# Patient Record
Sex: MALE | Race: WHITE | Employment: OTHER | URBAN - METROPOLITAN AREA
[De-identification: names, ages, dates, MRNs, and addresses within clinical notes are randomized per-mention and may not be internally consistent; named-entity substitution may affect disease eponyms.]

---

## 2017-01-17 ENCOUNTER — OFFICE VISIT (OUTPATIENT)
Dept: PULMONOLOGY | Age: 62
End: 2017-01-17

## 2017-01-17 VITALS
WEIGHT: 208 LBS | BODY MASS INDEX: 29.01 KG/M2 | HEART RATE: 62 BPM | SYSTOLIC BLOOD PRESSURE: 132 MMHG | DIASTOLIC BLOOD PRESSURE: 78 MMHG

## 2017-01-17 DIAGNOSIS — R05.3 CHRONIC COUGH: Primary | ICD-10-CM

## 2017-01-17 DIAGNOSIS — J45.30 MILD PERSISTENT ASTHMA WITHOUT COMPLICATION: ICD-10-CM

## 2017-01-17 PROCEDURE — 99214 OFFICE O/P EST MOD 30 MIN: CPT | Performed by: INTERNAL MEDICINE

## 2017-02-06 ENCOUNTER — TELEPHONE (OUTPATIENT)
Dept: FAMILY MEDICINE CLINIC | Age: 62
End: 2017-02-06

## 2017-02-06 DIAGNOSIS — E78.5 HYPERLIPIDEMIA, UNSPECIFIED HYPERLIPIDEMIA TYPE: Primary | ICD-10-CM

## 2017-02-06 DIAGNOSIS — Z11.59 NEED FOR HEPATITIS C SCREENING TEST: ICD-10-CM

## 2017-02-06 DIAGNOSIS — Z11.4 ENCOUNTER FOR SCREENING FOR HIV: ICD-10-CM

## 2017-02-06 DIAGNOSIS — Z12.5 SCREENING FOR MALIGNANT NEOPLASM OF PROSTATE: ICD-10-CM

## 2017-02-06 DIAGNOSIS — Z00.00 WELL ADULT EXAM: ICD-10-CM

## 2017-02-09 LAB
A/G RATIO: 1.8 (ref 1.1–2.2)
ALBUMIN SERPL-MCNC: 4.4 G/DL (ref 3.4–5)
ALP BLD-CCNC: 94 U/L (ref 40–129)
ALT SERPL-CCNC: 28 U/L (ref 10–40)
ANION GAP SERPL CALCULATED.3IONS-SCNC: 16 MMOL/L (ref 3–16)
AST SERPL-CCNC: 28 U/L (ref 15–37)
BASOPHILS ABSOLUTE: 0.1 K/UL (ref 0–0.2)
BASOPHILS RELATIVE PERCENT: 0.9 %
BILIRUB SERPL-MCNC: 0.5 MG/DL (ref 0–1)
BUN BLDV-MCNC: 21 MG/DL (ref 7–20)
CALCIUM SERPL-MCNC: 9.6 MG/DL (ref 8.3–10.6)
CHLORIDE BLD-SCNC: 100 MMOL/L (ref 99–110)
CHOLESTEROL, TOTAL: 181 MG/DL (ref 0–199)
CO2: 24 MMOL/L (ref 21–32)
CREAT SERPL-MCNC: 1.1 MG/DL (ref 0.8–1.3)
EOSINOPHILS ABSOLUTE: 0.1 K/UL (ref 0–0.6)
EOSINOPHILS RELATIVE PERCENT: 1.7 %
GFR AFRICAN AMERICAN: >60
GFR NON-AFRICAN AMERICAN: >60
GLOBULIN: 2.4 G/DL
GLUCOSE BLD-MCNC: 95 MG/DL (ref 70–99)
HCT VFR BLD CALC: 48.9 % (ref 40.5–52.5)
HDLC SERPL-MCNC: 73 MG/DL (ref 40–60)
HEMOGLOBIN: 16 G/DL (ref 13.5–17.5)
HEPATITIS C ANTIBODY INTERPRETATION: NORMAL
LDL CHOLESTEROL CALCULATED: 92 MG/DL
LYMPHOCYTES ABSOLUTE: 1.2 K/UL (ref 1–5.1)
LYMPHOCYTES RELATIVE PERCENT: 17.9 %
MCH RBC QN AUTO: 29.8 PG (ref 26–34)
MCHC RBC AUTO-ENTMCNC: 32.6 G/DL (ref 31–36)
MCV RBC AUTO: 91.3 FL (ref 80–100)
MONOCYTES ABSOLUTE: 0.7 K/UL (ref 0–1.3)
MONOCYTES RELATIVE PERCENT: 10.5 %
NEUTROPHILS ABSOLUTE: 4.7 K/UL (ref 1.7–7.7)
NEUTROPHILS RELATIVE PERCENT: 69 %
PDW BLD-RTO: 13.6 % (ref 12.4–15.4)
PLATELET # BLD: 183 K/UL (ref 135–450)
PMV BLD AUTO: 8.3 FL (ref 5–10.5)
POTASSIUM SERPL-SCNC: 4.7 MMOL/L (ref 3.5–5.1)
PROSTATE SPECIFIC ANTIGEN: 5.55 NG/ML (ref 0–4)
RBC # BLD: 5.36 M/UL (ref 4.2–5.9)
SODIUM BLD-SCNC: 140 MMOL/L (ref 136–145)
TOTAL CK: 140 U/L (ref 39–308)
TOTAL PROTEIN: 6.8 G/DL (ref 6.4–8.2)
TRIGL SERPL-MCNC: 78 MG/DL (ref 0–150)
VLDLC SERPL CALC-MCNC: 16 MG/DL
WBC # BLD: 6.8 K/UL (ref 4–11)

## 2017-02-10 LAB
ESTIMATED AVERAGE GLUCOSE: 108.3 MG/DL
HBA1C MFR BLD: 5.4 %
HIV-1 AND HIV-2 ANTIBODIES: NORMAL

## 2017-02-15 ENCOUNTER — OFFICE VISIT (OUTPATIENT)
Dept: PULMONOLOGY | Age: 62
End: 2017-02-15

## 2017-02-15 VITALS
SYSTOLIC BLOOD PRESSURE: 154 MMHG | BODY MASS INDEX: 29.01 KG/M2 | HEART RATE: 77 BPM | WEIGHT: 208 LBS | DIASTOLIC BLOOD PRESSURE: 84 MMHG

## 2017-02-15 DIAGNOSIS — R05.3 CHRONIC COUGH: Primary | ICD-10-CM

## 2017-02-15 DIAGNOSIS — R49.0 HOARSENESS: ICD-10-CM

## 2017-02-15 DIAGNOSIS — R09.89 CHRONIC THROAT CLEARING: ICD-10-CM

## 2017-02-15 PROCEDURE — 99213 OFFICE O/P EST LOW 20 MIN: CPT | Performed by: INTERNAL MEDICINE

## 2017-02-16 ENCOUNTER — OFFICE VISIT (OUTPATIENT)
Dept: FAMILY MEDICINE CLINIC | Age: 62
End: 2017-02-16

## 2017-02-16 VITALS
BODY MASS INDEX: 29.2 KG/M2 | DIASTOLIC BLOOD PRESSURE: 80 MMHG | HEART RATE: 63 BPM | SYSTOLIC BLOOD PRESSURE: 120 MMHG | HEIGHT: 71 IN | WEIGHT: 208.6 LBS | OXYGEN SATURATION: 95 %

## 2017-02-16 DIAGNOSIS — Z00.00 WELL ADULT EXAM: ICD-10-CM

## 2017-02-16 DIAGNOSIS — R25.2 MUSCLE CRAMPS AT NIGHT: ICD-10-CM

## 2017-02-16 DIAGNOSIS — I73.00 RAYNAUD'S DISEASE WITHOUT GANGRENE: ICD-10-CM

## 2017-02-16 DIAGNOSIS — N20.0 NEPHROLITHIASIS: ICD-10-CM

## 2017-02-16 DIAGNOSIS — R05.3 CHRONIC COUGH: ICD-10-CM

## 2017-02-16 DIAGNOSIS — N26.1 ATROPHY OF RIGHT KIDNEY: ICD-10-CM

## 2017-02-16 DIAGNOSIS — E78.5 HYPERLIPIDEMIA, UNSPECIFIED HYPERLIPIDEMIA TYPE: ICD-10-CM

## 2017-02-16 PROBLEM — J45.30 MILD PERSISTENT ASTHMA WITHOUT COMPLICATION: Status: RESOLVED | Noted: 2017-01-17 | Resolved: 2017-02-16

## 2017-02-16 PROCEDURE — 99396 PREV VISIT EST AGE 40-64: CPT | Performed by: FAMILY MEDICINE

## 2017-03-09 RX ORDER — ATORVASTATIN CALCIUM 10 MG/1
10 TABLET, FILM COATED ORAL DAILY
Qty: 30 TABLET | Refills: 3 | Status: SHIPPED | OUTPATIENT
Start: 2017-03-09 | End: 2017-04-12 | Stop reason: SDUPTHER

## 2017-04-12 RX ORDER — ATORVASTATIN CALCIUM 10 MG/1
10 TABLET, FILM COATED ORAL DAILY
Qty: 90 TABLET | Refills: 3 | Status: SHIPPED | OUTPATIENT
Start: 2017-04-12 | End: 2018-04-18 | Stop reason: SDUPTHER

## 2017-09-28 ENCOUNTER — OFFICE VISIT (OUTPATIENT)
Dept: DERMATOLOGY | Age: 62
End: 2017-09-28

## 2017-09-28 DIAGNOSIS — L57.0 AK (ACTINIC KERATOSIS): ICD-10-CM

## 2017-09-28 DIAGNOSIS — Z85.828 HISTORY OF SKIN CANCER: Primary | ICD-10-CM

## 2017-09-28 DIAGNOSIS — D22.9 MULTIPLE NEVI: ICD-10-CM

## 2017-09-28 DIAGNOSIS — L82.1 SEBORRHEIC KERATOSIS: ICD-10-CM

## 2017-09-28 PROCEDURE — 17000 DESTRUCT PREMALG LESION: CPT | Performed by: DERMATOLOGY

## 2017-09-28 PROCEDURE — 17003 DESTRUCT PREMALG LES 2-14: CPT | Performed by: DERMATOLOGY

## 2017-09-28 PROCEDURE — 99213 OFFICE O/P EST LOW 20 MIN: CPT | Performed by: DERMATOLOGY

## 2017-11-20 ENCOUNTER — TELEPHONE (OUTPATIENT)
Dept: FAMILY MEDICINE CLINIC | Age: 62
End: 2017-11-20

## 2017-11-20 NOTE — TELEPHONE ENCOUNTER
Pt thinks he has bronchitis. Has a gurgling sensation in chest, fever of 100 at one point, cough-dry, headaches, chills and feels like he is burning up, body aches, fatigue. The cough is keeping him awake.  Yesterday was having throbbing pain in left side of chest

## 2017-12-11 ENCOUNTER — TELEPHONE (OUTPATIENT)
Dept: FAMILY MEDICINE CLINIC | Age: 62
End: 2017-12-11

## 2017-12-11 NOTE — TELEPHONE ENCOUNTER
Pt needs a pre-op before next Tuesday 12/19. His surgery is next Tuesday 12/19/17,[facility], Dr. Rita Amor.     Please advise  [facility] [phone]

## 2017-12-12 NOTE — TELEPHONE ENCOUNTER
Pt called said that if he doesn't get a call back by the end of business today he is changing doctors and will come in and set down with Dr. Marleny Burk and tell him that his office staff is horrible and he can never get in to see him.

## 2017-12-14 ENCOUNTER — OFFICE VISIT (OUTPATIENT)
Dept: FAMILY MEDICINE CLINIC | Age: 62
End: 2017-12-14

## 2017-12-14 VITALS
WEIGHT: 207.3 LBS | BODY MASS INDEX: 29.02 KG/M2 | HEART RATE: 81 BPM | HEIGHT: 71 IN | OXYGEN SATURATION: 97 % | DIASTOLIC BLOOD PRESSURE: 80 MMHG | SYSTOLIC BLOOD PRESSURE: 120 MMHG

## 2017-12-14 DIAGNOSIS — Z01.818 PRE-OP EXAM: ICD-10-CM

## 2017-12-14 DIAGNOSIS — N20.0 NEPHROLITHIASIS: ICD-10-CM

## 2017-12-14 PROCEDURE — 3017F COLORECTAL CA SCREEN DOC REV: CPT | Performed by: FAMILY MEDICINE

## 2017-12-14 PROCEDURE — G8427 DOCREV CUR MEDS BY ELIG CLIN: HCPCS | Performed by: FAMILY MEDICINE

## 2017-12-14 PROCEDURE — G8482 FLU IMMUNIZE ORDER/ADMIN: HCPCS | Performed by: FAMILY MEDICINE

## 2017-12-14 PROCEDURE — 99244 OFF/OP CNSLTJ NEW/EST MOD 40: CPT | Performed by: FAMILY MEDICINE

## 2017-12-14 PROCEDURE — G8419 CALC BMI OUT NRM PARAM NOF/U: HCPCS | Performed by: FAMILY MEDICINE

## 2017-12-14 ASSESSMENT — PATIENT HEALTH QUESTIONNAIRE - PHQ9
2. FEELING DOWN, DEPRESSED OR HOPELESS: 0
SUM OF ALL RESPONSES TO PHQ9 QUESTIONS 1 & 2: 0
1. LITTLE INTEREST OR PLEASURE IN DOING THINGS: 0
SUM OF ALL RESPONSES TO PHQ QUESTIONS 1-9: 0

## 2017-12-14 NOTE — PROGRESS NOTES
Subjective:      Dk Alba is a 57 y.o. male who presents to the office today for a preoperative consultation at the request of surgeon Dr Kelsi Padilla who plans on performing lithotripsy(has stone in R kidney) on December 19. This consultation is requested for the specific conditions prompting preoperative evaluation (i.e. because of potential affect on operative risk): none. Planned anesthesia is General.  The patient and family have no known anesthesia issues nor bleeding risks. Past Medical History:   Diagnosis Date    Basal cell carcinoma of right nasal sidewall 6/4/2015    BPH (benign prostatic hyperplasia) 10/18/2010    Hyperlipidemia 10/18/2010    Mild persistent asthma without complication 5/63/4930    Nephrolithiasis     s/p lithotripsy x 4, calcium oxalate.      Raynaud's disease 12/4/2014    Wears glasses      Patient Active Problem List    Diagnosis Date Noted    Nephrolithiasis      Priority: High    Atrophy of right kidney 02/16/2017    Muscle cramps at night 02/16/2017    Hoarseness 02/15/2017    Chronic cough 12/14/2016    Chronic throat clearing 11/22/2016    Pre-op exam 09/10/2015    Laryngopharyngeal reflux (LPR) 09/10/2015    Visit for wound check 06/24/2015    Basal cell carcinoma of right nasal sidewall 06/04/2015    RBBB 12/04/2014    Raynaud's disease 12/04/2014    Rhinitis due food 12/04/2014    Vitamin D deficiency 11/18/2010    Hyperlipidemia 10/18/2010    BPH with urinary obstruction 10/18/2010    Well adult exam 10/18/2010     Past Surgical History:   Procedure Laterality Date    COLONOSCOPY  2007    q 10    CYSTOSCOPY  11/14/13     CYSTOSCOPY TRANSURETHRAL RESECTION PROSTATE    HERNIA REPAIR      KIDNEY STONE SURGERY Left 8/29/13    perc. nephrolithotomy,stint placement    MOHS [facility]GERY  06[facility]    [facility]    OTHER SURGICAL HISTORY      perirectal abscess 1992    PROSTATE SURGERY  4/05    biopsy     Family History   Problem Relation Age of Onset    Hypertension Father     Heart Disease Father      valvular heart disease    Cancer Mother      lymphoma  80     Social History     Social History    Marital status:      Spouse name: N/A    Number of children: 3    Years of education: N/A     Occupational History    [employer]       Social History Main Topics    Smoking status: Never Smoker    Smokeless tobacco: Never Used    Alcohol use No    Drug use: No    Sexual activity: Yes     Partners: Female     Other Topics Concern    None     Social History Narrative    Likes job>50/w( at [employer])    Exercise daily     happily     All kids out of town; scattered-4 grandchildren    + seatbelts    Hobbies-astronomy--?  and Jainism     Current Outpatient Prescriptions   Medication Sig Dispense Refill    atorvastatin (LIPITOR) 10 MG tablet Take 1 tablet by mouth daily 90 tablet 3    hydrocortisone (WESTCORT) 0.2 % cream Apply topically 2 times daily. 30 g 2    OMEPRAZOLE PO Take by mouth      FISH OIL by Does not apply route.  aspirin 81 MG tablet Take 81 mg by mouth daily.  Multiple Vitamins-Minerals (MULTI FOR HIM 50+) TABS Take  by mouth.  Cholecalciferol (VITAMIN D3) 1000 UNIT CAPS Take 2,000 Int'l Units by mouth daily.  Coenzyme Q-10 100 MG CAPS Take 1 capsule by mouth daily.  niacin 500 MG tablet Take 1,000 mg by mouth daily (with breakfast).        Current Facility-Administered Medications   Medication Dose Route Frequency Provider Last Rate Last Dose    triamcinolone acetonide (KENALOG-40) injection 40 mg  40 mg Intra-Lesional Once Franki Seymour MD        triamcinolone acetonide (KENALOG) injection 10 mg  10 mg Intradermal Once Jesus Griffin MD         Current Outpatient Prescriptions on File Prior to Visit   Medication Sig Dispense Refill    atorvastatin (LIPITOR) 10 MG tablet Take 1 tablet by mouth daily 90 tablet 3    hydrocortisone (WESTCORT) adenopathy, thyroid: not enlarged, symmetric, no tenderness/mass/nodules, no carotid bruit or JVD   Back:   Symmetric, no curvature, ROM normal, no CVA tenderness   Lungs:   Clear to auscultation bilaterally, respirations unlabored   Chest Wall:  No tenderness or deformity   Heart:  Regular rate and rhythm, S1, S2 normal, no murmur, rub or gallop   Abdomen:   Soft, non-tender, bowel sounds active all four quadrants,  no masses, no organomegaly   Genitalia:  Normal male   Rectal:  Normal tone, normal prostate, no masses or tenderness;  guaiac negative stool   Extremities: Extremities normal, atraumatic, no cyanosis or edema   Pulses: 2+ and symmetric   Skin: Skin color, texture, turgor normal, no rashes or lesions   Lymph nodes: Cervical, supraclavicular, and axillary nodes normal   Neurologic: Normal           Predictors of intubation difficulty:   Morbid obesity? no   Anatomically abnormal facies? no   Prominent incisors? no   Receding mandible? no   Short, thick neck? no   Neck range of motion: normal   Mallampati score: I (soft palate, uvula, fauces, tonsillar pillars visible)   Thyromental distance: < 6cm   Mouth openincm   Dentition: No chipped, loose, or missing teeth. Lab Review   No visits with results within 2 Month(s) from this visit.    Latest known visit with results is:   Telephone on 2017   Component Date Value    Hemoglobin A1C 02/10/2017 5.4     eAG 02/10/2017 108.3     WBC 2017 6.8     RBC 2017 5.36     Hemoglobin 2017 16.0     Hematocrit 2017 48.9     MCV 2017 91.3     MCH 2017 29.8     MCHC 2017 32.6     RDW 2017 13.6     Platelets  183     MPV 2017 8.3     Neutrophils % 2017 69.0     Lymphocytes % 2017 17.9     Monocytes % 2017 10.5     Eosinophils % 2017 1.7     Basophils % 2017 0.9     Neutrophils # 2017 4.7     Lymphocytes # 2017 1.2     Monocytes # 02/09/2017 0.7     Eosinophils # 02/09/2017 0.1     Basophils # 02/09/2017 0.1     Sodium 02/09/2017 140     Potassium 02/09/2017 4.7     Chloride 02/09/2017 100     CO2 02/09/2017 24     Anion Gap 02/09/2017 16     Glucose 02/09/2017 95     BUN 02/09/2017 21*    CREATININE 02/09/2017 1.1     GFR Non- 02/09/2017 >60     GFR  02/09/2017 >60     Calcium 02/09/2017 9.6     Total Protein 02/09/2017 6.8     Alb 02/09/2017 4.4     Albumin/Globulin Ratio 02/09/2017 1.8     Total Bilirubin 02/09/2017 0.5     Alkaline Phosphatase 02/09/2017 94     ALT 02/09/2017 28     AST 02/09/2017 28     Globulin 02/09/2017 2.4     Total CK 02/09/2017 140     Cholesterol, Total 02/09/2017 181     Triglycerides 02/09/2017 78     HDL 02/09/2017 73*    LDL Calculated 02/09/2017 92     VLDL CHOLESTEROL CALCULA* 02/09/2017 16     Hep C Ab Interp 02/09/2017 Non-reactive     HIV-1/HIV-2 Ab 02/10/2017 Non-reactive     PSA 02/09/2017 5.55*         Assessment:        57 y.o. male with planned surgery as above--OKd for planned procedure  Known risk factors for perioperative complications: None    Difficulty with intubation is not anticipated. Cardiac Risk Estimation: per the Revised Cardiac Risk Index --low risk    Current medications which may produce withdrawal symptoms if withheld perioperatively: none       Plan:      1. Preoperative workup as follows none  2. Change in medication regimen before surgery: none, continue med regimen including morning of surgery, w/sip of water  3. Prophylaxis for cardiac events with perioperative beta-blockers: not indicated  4. Invasive hemodynamic monitoring perioperatively: not indicated  5.  Deep vein thrombosis prophylaxis postoperatively:regimen to be chosen by surgical team

## 2018-04-18 RX ORDER — ATORVASTATIN CALCIUM 10 MG/1
10 TABLET, FILM COATED ORAL DAILY
Qty: 90 TABLET | Refills: 3 | Status: SHIPPED | OUTPATIENT
Start: 2018-04-18 | End: 2018-09-12 | Stop reason: SDUPTHER

## 2018-04-26 ENCOUNTER — TELEPHONE (OUTPATIENT)
Dept: FAMILY MEDICINE CLINIC | Age: 63
End: 2018-04-26

## 2018-04-26 ENCOUNTER — OFFICE VISIT (OUTPATIENT)
Dept: INTERNAL MEDICINE | Age: 63
End: 2018-04-26

## 2018-04-26 VITALS
BODY MASS INDEX: 29.54 KG/M2 | DIASTOLIC BLOOD PRESSURE: 74 MMHG | WEIGHT: 211 LBS | HEART RATE: 68 BPM | SYSTOLIC BLOOD PRESSURE: 132 MMHG | HEIGHT: 71 IN | OXYGEN SATURATION: 97 %

## 2018-04-26 DIAGNOSIS — H66.003 ACUTE SUPPURATIVE OTITIS MEDIA OF BOTH EARS WITHOUT SPONTANEOUS RUPTURE OF TYMPANIC MEMBRANES, RECURRENCE NOT SPECIFIED: Primary | ICD-10-CM

## 2018-04-26 PROCEDURE — 3017F COLORECTAL CA SCREEN DOC REV: CPT | Performed by: NURSE PRACTITIONER

## 2018-04-26 PROCEDURE — G8419 CALC BMI OUT NRM PARAM NOF/U: HCPCS | Performed by: NURSE PRACTITIONER

## 2018-04-26 PROCEDURE — 99213 OFFICE O/P EST LOW 20 MIN: CPT | Performed by: NURSE PRACTITIONER

## 2018-04-26 PROCEDURE — 1036F TOBACCO NON-USER: CPT | Performed by: NURSE PRACTITIONER

## 2018-04-26 PROCEDURE — G8427 DOCREV CUR MEDS BY ELIG CLIN: HCPCS | Performed by: NURSE PRACTITIONER

## 2018-04-26 RX ORDER — AMOXICILLIN 500 MG/1
500 CAPSULE ORAL 3 TIMES DAILY
Qty: 21 CAPSULE | Refills: 0 | Status: SHIPPED | OUTPATIENT
Start: 2018-04-26 | End: 2018-05-03

## 2018-04-26 ASSESSMENT — ENCOUNTER SYMPTOMS
SINUS PRESSURE: 0
SINUS PAIN: 0
SHORTNESS OF BREATH: 0
COUGH: 0
WHEEZING: 0

## 2018-09-12 ENCOUNTER — TELEPHONE (OUTPATIENT)
Dept: FAMILY MEDICINE CLINIC | Age: 63
End: 2018-09-12

## 2018-09-12 RX ORDER — ATORVASTATIN CALCIUM 10 MG/1
10 TABLET, FILM COATED ORAL DAILY
Qty: 90 TABLET | Refills: 3 | Status: SHIPPED | OUTPATIENT
Start: 2018-09-12 | End: 2018-12-13 | Stop reason: SDUPTHER

## 2018-09-21 ENCOUNTER — TELEPHONE (OUTPATIENT)
Dept: FAMILY MEDICINE CLINIC | Age: 63
End: 2018-09-21

## 2018-10-01 ENCOUNTER — OFFICE VISIT (OUTPATIENT)
Dept: DERMATOLOGY | Age: 63
End: 2018-10-01
Payer: COMMERCIAL

## 2018-10-01 DIAGNOSIS — L82.1 SEBORRHEIC KERATOSIS: ICD-10-CM

## 2018-10-01 DIAGNOSIS — D22.9 MULTIPLE NEVI: ICD-10-CM

## 2018-10-01 DIAGNOSIS — M71.30 MYXOID CYST: ICD-10-CM

## 2018-10-01 DIAGNOSIS — Z87.2 HISTORY OF ACTINIC KERATOSES: ICD-10-CM

## 2018-10-01 DIAGNOSIS — D23.9 DERMATOFIBROMA: ICD-10-CM

## 2018-10-01 DIAGNOSIS — Z85.828 HISTORY OF NONMELANOMA SKIN CANCER: Primary | ICD-10-CM

## 2018-10-01 PROCEDURE — 99213 OFFICE O/P EST LOW 20 MIN: CPT | Performed by: DERMATOLOGY

## 2018-10-04 ENCOUNTER — OFFICE VISIT (OUTPATIENT)
Dept: ORTHOPEDIC SURGERY | Age: 63
End: 2018-10-04
Payer: COMMERCIAL

## 2018-10-04 VITALS
RESPIRATION RATE: 16 BRPM | SYSTOLIC BLOOD PRESSURE: 136 MMHG | BODY MASS INDEX: 29.54 KG/M2 | HEIGHT: 71 IN | DIASTOLIC BLOOD PRESSURE: 81 MMHG | HEART RATE: 55 BPM | WEIGHT: 211 LBS

## 2018-10-04 DIAGNOSIS — M25.562 LEFT KNEE PAIN, UNSPECIFIED CHRONICITY: Primary | ICD-10-CM

## 2018-10-04 PROCEDURE — 99203 OFFICE O/P NEW LOW 30 MIN: CPT | Performed by: ORTHOPAEDIC SURGERY

## 2018-11-06 ENCOUNTER — TELEPHONE (OUTPATIENT)
Dept: FAMILY MEDICINE CLINIC | Age: 63
End: 2018-11-06

## 2018-11-06 DIAGNOSIS — Z00.00 WELL ADULT EXAM: Primary | ICD-10-CM

## 2018-11-06 DIAGNOSIS — N13.8 BPH WITH URINARY OBSTRUCTION: ICD-10-CM

## 2018-11-06 DIAGNOSIS — E78.5 HYPERLIPIDEMIA, UNSPECIFIED HYPERLIPIDEMIA TYPE: ICD-10-CM

## 2018-11-06 DIAGNOSIS — Z12.5 PROSTATE CANCER SCREENING: ICD-10-CM

## 2018-11-06 DIAGNOSIS — E55.9 VITAMIN D DEFICIENCY: ICD-10-CM

## 2018-11-06 DIAGNOSIS — N40.1 BPH WITH URINARY OBSTRUCTION: ICD-10-CM

## 2018-11-07 LAB
A/G RATIO: 2 (ref 1.1–2.2)
ALBUMIN SERPL-MCNC: 4.3 G/DL (ref 3.4–5)
ALP BLD-CCNC: 105 U/L (ref 40–129)
ALT SERPL-CCNC: 44 U/L (ref 10–40)
ANION GAP SERPL CALCULATED.3IONS-SCNC: 12 MMOL/L (ref 3–16)
AST SERPL-CCNC: 32 U/L (ref 15–37)
BASOPHILS ABSOLUTE: 0 K/UL (ref 0–0.2)
BASOPHILS RELATIVE PERCENT: 0.7 %
BILIRUB SERPL-MCNC: 0.6 MG/DL (ref 0–1)
BUN BLDV-MCNC: 23 MG/DL (ref 7–20)
CALCIUM SERPL-MCNC: 9.6 MG/DL (ref 8.3–10.6)
CHLORIDE BLD-SCNC: 101 MMOL/L (ref 99–110)
CHOLESTEROL, TOTAL: 144 MG/DL (ref 0–199)
CO2: 25 MMOL/L (ref 21–32)
CREAT SERPL-MCNC: 1.1 MG/DL (ref 0.8–1.3)
EOSINOPHILS ABSOLUTE: 0.1 K/UL (ref 0–0.6)
EOSINOPHILS RELATIVE PERCENT: 1.9 %
GFR AFRICAN AMERICAN: >60
GFR NON-AFRICAN AMERICAN: >60
GLOBULIN: 2.1 G/DL
GLUCOSE BLD-MCNC: 90 MG/DL (ref 70–99)
HCT VFR BLD CALC: 46.6 % (ref 40.5–52.5)
HDLC SERPL-MCNC: 50 MG/DL (ref 40–60)
HEMOGLOBIN: 15.7 G/DL (ref 13.5–17.5)
LDL CHOLESTEROL CALCULATED: 82 MG/DL
LYMPHOCYTES ABSOLUTE: 1.5 K/UL (ref 1–5.1)
LYMPHOCYTES RELATIVE PERCENT: 21.1 %
MCH RBC QN AUTO: 30.3 PG (ref 26–34)
MCHC RBC AUTO-ENTMCNC: 33.8 G/DL (ref 31–36)
MCV RBC AUTO: 89.8 FL (ref 80–100)
MONOCYTES ABSOLUTE: 0.7 K/UL (ref 0–1.3)
MONOCYTES RELATIVE PERCENT: 10.1 %
NEUTROPHILS ABSOLUTE: 4.6 K/UL (ref 1.7–7.7)
NEUTROPHILS RELATIVE PERCENT: 66.2 %
PDW BLD-RTO: 13.5 % (ref 12.4–15.4)
PLATELET # BLD: 222 K/UL (ref 135–450)
PMV BLD AUTO: 8.3 FL (ref 5–10.5)
POTASSIUM SERPL-SCNC: 4.6 MMOL/L (ref 3.5–5.1)
PROSTATE SPECIFIC ANTIGEN: 5.11 NG/ML (ref 0–4)
RBC # BLD: 5.19 M/UL (ref 4.2–5.9)
SODIUM BLD-SCNC: 138 MMOL/L (ref 136–145)
TOTAL CK: 109 U/L (ref 39–308)
TOTAL PROTEIN: 6.4 G/DL (ref 6.4–8.2)
TRIGL SERPL-MCNC: 62 MG/DL (ref 0–150)
VITAMIN D 25-HYDROXY: 40 NG/ML
VLDLC SERPL CALC-MCNC: 12 MG/DL
WBC # BLD: 7 K/UL (ref 4–11)

## 2018-12-13 RX ORDER — ATORVASTATIN CALCIUM 10 MG/1
10 TABLET, FILM COATED ORAL DAILY
Qty: 90 TABLET | Refills: 3 | Status: SHIPPED | OUTPATIENT
Start: 2018-12-13 | End: 2019-12-04 | Stop reason: ALTCHOICE

## 2018-12-24 ENCOUNTER — OFFICE VISIT (OUTPATIENT)
Dept: FAMILY MEDICINE CLINIC | Age: 63
End: 2018-12-24
Payer: COMMERCIAL

## 2018-12-24 VITALS
DIASTOLIC BLOOD PRESSURE: 84 MMHG | HEART RATE: 64 BPM | WEIGHT: 210 LBS | BODY MASS INDEX: 29.29 KG/M2 | OXYGEN SATURATION: 95 % | SYSTOLIC BLOOD PRESSURE: 136 MMHG

## 2018-12-24 DIAGNOSIS — E78.5 HYPERLIPIDEMIA, UNSPECIFIED HYPERLIPIDEMIA TYPE: ICD-10-CM

## 2018-12-24 DIAGNOSIS — E55.9 VITAMIN D DEFICIENCY: ICD-10-CM

## 2018-12-24 DIAGNOSIS — K57.30 COLON, DIVERTICULOSIS: ICD-10-CM

## 2018-12-24 DIAGNOSIS — Z23 NEED FOR SHINGLES VACCINE: ICD-10-CM

## 2018-12-24 DIAGNOSIS — Z23 NEED FOR PNEUMOCOCCAL VACCINE: ICD-10-CM

## 2018-12-24 DIAGNOSIS — R09.89 CHRONIC THROAT CLEARING: ICD-10-CM

## 2018-12-24 DIAGNOSIS — Z00.00 WELL ADULT EXAM: Primary | ICD-10-CM

## 2018-12-24 DIAGNOSIS — R25.2 MUSCLE CRAMPS AT NIGHT: ICD-10-CM

## 2018-12-24 PROBLEM — R49.0 HOARSENESS: Status: RESOLVED | Noted: 2017-02-15 | Resolved: 2018-12-24

## 2018-12-24 PROCEDURE — 90471 IMMUNIZATION ADMIN: CPT | Performed by: FAMILY MEDICINE

## 2018-12-24 PROCEDURE — 99397 PER PM REEVAL EST PAT 65+ YR: CPT | Performed by: FAMILY MEDICINE

## 2018-12-24 PROCEDURE — 90670 PCV13 VACCINE IM: CPT | Performed by: FAMILY MEDICINE

## 2018-12-24 ASSESSMENT — PATIENT HEALTH QUESTIONNAIRE - PHQ9
2. FEELING DOWN, DEPRESSED OR HOPELESS: 0
SUM OF ALL RESPONSES TO PHQ9 QUESTIONS 1 & 2: 0
1. LITTLE INTEREST OR PLEASURE IN DOING THINGS: 0
SUM OF ALL RESPONSES TO PHQ QUESTIONS 1-9: 0
SUM OF ALL RESPONSES TO PHQ QUESTIONS 1-9: 0

## 2018-12-24 NOTE — PROGRESS NOTES
without hernia or inguinal adenopathy. Musculoskeletal: Normal range of motion, no synovitis. He exhibits no edema. Neurological: He is alert and oriented to person, place, and time. He has normal reflexes. No cranial nerve deficit. Coordination normal.   Skin: Skin is warm, dry and intact. No suspicious lesions are noted. Psychiatric: He has a normal mood and affect. His speech is normal and behavior is normal. Judgment, cognition and memory are normal.   No visits with results within 1 Month(s) from this visit.    Latest known visit with results is:   Telephone on 11/06/2018   Component Date Value Ref Range Status    PSA 11/07/2018 5.11* 0.00 - 4.00 ng/mL Final    Sodium 11/07/2018 138  136 - 145 mmol/L Final    Potassium 11/07/2018 4.6  3.5 - 5.1 mmol/L Final    Chloride 11/07/2018 101  99 - 110 mmol/L Final    CO2 11/07/2018 25  21 - 32 mmol/L Final    Anion Gap 11/07/2018 12  3 - 16 Final    Glucose 11/07/2018 90  70 - 99 mg/dL Final    BUN 11/07/2018 23* 7 - 20 mg/dL Final    CREATININE 11/07/2018 1.1  0.8 - 1.3 mg/dL Final    GFR Non- 11/07/2018 >60  >60 Final    GFR  11/07/2018 >60  >60 Final    Calcium 11/07/2018 9.6  8.3 - 10.6 mg/dL Final    Total Protein 11/07/2018 6.4  6.4 - 8.2 g/dL Final    Alb 11/07/2018 4.3  3.4 - 5.0 g/dL Final    Albumin/Globulin Ratio 11/07/2018 2.0  1.1 - 2.2 Final    Total Bilirubin 11/07/2018 0.6  0.0 - 1.0 mg/dL Final    Alkaline Phosphatase 11/07/2018 105  40 - 129 U/L Final    ALT 11/07/2018 44* 10 - 40 U/L Final    AST 11/07/2018 32  15 - 37 U/L Final    Globulin 11/07/2018 2.1  g/dL Final    WBC 11/07/2018 7.0  4.0 - 11.0 K/uL Final    RBC 11/07/2018 5.19  4.20 - 5.90 M/uL Final    Hemoglobin 11/07/2018 15.7  13.5 - 17.5 g/dL Final    Hematocrit 11/07/2018 46.6  40.5 - 52.5 % Final    MCV 11/07/2018 89.8  80.0 - 100.0 fL Final    MCH 11/07/2018 30.3  26.0 - 34.0 pg Final    MCHC 11/07/2018 33.8  31.0 - 36.0

## 2019-02-27 ENCOUNTER — TELEPHONE (OUTPATIENT)
Dept: SURGERY | Age: 64
End: 2019-02-27

## 2019-03-26 ENCOUNTER — TELEPHONE (OUTPATIENT)
Dept: FAMILY MEDICINE CLINIC | Age: 64
End: 2019-03-26

## 2019-03-27 ENCOUNTER — OFFICE VISIT (OUTPATIENT)
Dept: FAMILY MEDICINE CLINIC | Age: 64
End: 2019-03-27
Payer: COMMERCIAL

## 2019-03-27 VITALS
WEIGHT: 172 LBS | OXYGEN SATURATION: 96 % | HEART RATE: 61 BPM | HEIGHT: 71 IN | DIASTOLIC BLOOD PRESSURE: 82 MMHG | SYSTOLIC BLOOD PRESSURE: 140 MMHG | BODY MASS INDEX: 24.08 KG/M2

## 2019-03-27 DIAGNOSIS — I49.9 IRREGULAR HEART BEAT: Primary | ICD-10-CM

## 2019-03-27 DIAGNOSIS — I45.10 RBBB: ICD-10-CM

## 2019-03-27 DIAGNOSIS — R00.2 PALPITATION: ICD-10-CM

## 2019-03-27 DIAGNOSIS — I49.9 IRREGULAR HEART BEAT: ICD-10-CM

## 2019-03-27 LAB
ANION GAP SERPL CALCULATED.3IONS-SCNC: 8 MMOL/L (ref 3–16)
BASOPHILS ABSOLUTE: 0 K/UL (ref 0–0.2)
BASOPHILS RELATIVE PERCENT: 0.7 %
BUN BLDV-MCNC: 21 MG/DL (ref 7–20)
CALCIUM SERPL-MCNC: 9.7 MG/DL (ref 8.3–10.6)
CHLORIDE BLD-SCNC: 104 MMOL/L (ref 99–110)
CO2: 28 MMOL/L (ref 21–32)
CREAT SERPL-MCNC: 1 MG/DL (ref 0.8–1.3)
EOSINOPHILS ABSOLUTE: 0.1 K/UL (ref 0–0.6)
EOSINOPHILS RELATIVE PERCENT: 1.4 %
GFR AFRICAN AMERICAN: >60
GFR NON-AFRICAN AMERICAN: >60
GLUCOSE BLD-MCNC: 83 MG/DL (ref 70–99)
HCT VFR BLD CALC: 47.2 % (ref 40.5–52.5)
HEMOGLOBIN: 15.5 G/DL (ref 13.5–17.5)
LYMPHOCYTES ABSOLUTE: 1.4 K/UL (ref 1–5.1)
LYMPHOCYTES RELATIVE PERCENT: 21.6 %
MCH RBC QN AUTO: 29.9 PG (ref 26–34)
MCHC RBC AUTO-ENTMCNC: 32.7 G/DL (ref 31–36)
MCV RBC AUTO: 91.4 FL (ref 80–100)
MONOCYTES ABSOLUTE: 1 K/UL (ref 0–1.3)
MONOCYTES RELATIVE PERCENT: 14.7 %
NEUTROPHILS ABSOLUTE: 4 K/UL (ref 1.7–7.7)
NEUTROPHILS RELATIVE PERCENT: 61.6 %
PDW BLD-RTO: 13.2 % (ref 12.4–15.4)
PLATELET # BLD: 223 K/UL (ref 135–450)
PMV BLD AUTO: 8.3 FL (ref 5–10.5)
POTASSIUM SERPL-SCNC: 4.4 MMOL/L (ref 3.5–5.1)
RBC # BLD: 5.17 M/UL (ref 4.2–5.9)
SODIUM BLD-SCNC: 140 MMOL/L (ref 136–145)
TSH SERPL DL<=0.05 MIU/L-ACNC: 2.54 UIU/ML (ref 0.27–4.2)
WBC # BLD: 6.5 K/UL (ref 4–11)

## 2019-03-27 PROCEDURE — 93000 ELECTROCARDIOGRAM COMPLETE: CPT | Performed by: FAMILY MEDICINE

## 2019-03-27 PROCEDURE — 99213 OFFICE O/P EST LOW 20 MIN: CPT | Performed by: FAMILY MEDICINE

## 2019-03-27 ASSESSMENT — ENCOUNTER SYMPTOMS
NAUSEA: 0
VOMITING: 0
COUGH: 0
SHORTNESS OF BREATH: 0

## 2019-03-27 ASSESSMENT — PATIENT HEALTH QUESTIONNAIRE - PHQ9
SUM OF ALL RESPONSES TO PHQ QUESTIONS 1-9: 0
SUM OF ALL RESPONSES TO PHQ9 QUESTIONS 1 & 2: 0
SUM OF ALL RESPONSES TO PHQ QUESTIONS 1-9: 0
2. FEELING DOWN, DEPRESSED OR HOPELESS: 0
1. LITTLE INTEREST OR PLEASURE IN DOING THINGS: 0

## 2019-03-28 ENCOUNTER — TELEPHONE (OUTPATIENT)
Dept: FAMILY MEDICINE CLINIC | Age: 64
End: 2019-03-28

## 2019-04-18 ENCOUNTER — TELEPHONE (OUTPATIENT)
Dept: FAMILY MEDICINE CLINIC | Age: 64
End: 2019-04-18

## 2019-04-30 ENCOUNTER — TELEPHONE (OUTPATIENT)
Dept: DERMATOLOGY | Age: 64
End: 2019-04-30

## 2019-05-14 ENCOUNTER — TELEPHONE (OUTPATIENT)
Dept: FAMILY MEDICINE CLINIC | Age: 64
End: 2019-05-14

## 2019-05-16 ENCOUNTER — TELEPHONE (OUTPATIENT)
Dept: DERMATOLOGY | Age: 64
End: 2019-05-16

## 2019-05-16 NOTE — TELEPHONE ENCOUNTER
Patient had to cancel his 5/16/19 at 4:00 appointment due to traffic.    new growth on back side of left hand - couple weeks    Requesting that Anat Nj contact patient to reschedule.

## 2019-05-20 ENCOUNTER — HOSPITAL ENCOUNTER (OUTPATIENT)
Dept: NON INVASIVE DIAGNOSTICS | Age: 64
Discharge: HOME | End: 2019-05-20
Payer: COMMERCIAL

## 2019-05-20 DIAGNOSIS — I49.9 IRREGULAR HEART BEAT: ICD-10-CM

## 2019-05-23 ENCOUNTER — OFFICE VISIT (OUTPATIENT)
Dept: DERMATOLOGY | Age: 64
End: 2019-05-23
Payer: COMMERCIAL

## 2019-05-23 DIAGNOSIS — D23.9 DERMATOFIBROMA: ICD-10-CM

## 2019-05-23 DIAGNOSIS — L82.0 INFLAMED SEBORRHEIC KERATOSIS: Primary | ICD-10-CM

## 2019-05-23 DIAGNOSIS — L73.9 FOLLICULITIS: ICD-10-CM

## 2019-05-23 DIAGNOSIS — Z85.828 HISTORY OF NONMELANOMA SKIN CANCER: ICD-10-CM

## 2019-05-23 PROCEDURE — 17110 DESTRUCTION B9 LES UP TO 14: CPT | Performed by: DERMATOLOGY

## 2019-05-23 PROCEDURE — 99213 OFFICE O/P EST LOW 20 MIN: CPT | Performed by: DERMATOLOGY

## 2019-05-23 NOTE — PROGRESS NOTES
[facility]  Dontrell Bradley MD  [phone]      Dk Alba  1955    70 y.o. male     Date of Visit: 5/23/2019    Chief Complaint: f/u skin cancer, lesions  Chief Complaint   Patient presents with    Skin Lesion     back of lt hand     Last seen:     History of Present Illness:    1. Here for a lesion on the L hand that appeared several weeks ago and was crusted and itchy. Seems better now - almost clear but not quite. 2. He notes a few erythematous papules on the upper arms recently. asx  Transient. No trx tried. 3. He notes a firm papule on the L knee. Asx.     4. F/u for skin cancer. S/p Mohs 6-2015 for nod BCC on the R nasal sidewall and then scar revision. No probs with this site - scar looks great and no pain, no bleeding, no concerns. Previously discussed flushing with niacin. No personal or family hx of skin cancer. Review of Systems:  Gen: Feels well, good sense of health. Skin: No changing moles or lesions. Past Medical History, Family History, Surgical History, Medications and Allergies reviewed. Past Medical History:   Diagnosis Date    Basal cell carcinoma of right nasal sidewall 6/4/2015    BPH (benign prostatic hyperplasia) 10/18/2010    Colon, diverticulosis 12/24/2018    Hyperlipidemia 10/18/2010    Mild persistent asthma without complication 2/50/7624    Nephrolithiasis     s/p lithotripsy x 4, calcium oxalate.      Raynaud's disease 12/4/2014    Wears glasses        Past Surgical History:   Procedure Laterality Date    COLONOSCOPY  2007    q 10,5/2018    CYSTOSCOPY  11/14/13     CYSTOSCOPY TRANSURETHRAL RESECTION PROSTATE    HERNIA REPAIR      KIDNEY STONE SURGERY Left 8/29/13    perc. nephrolithotomy,stint placement    MOHS SURGERY  06/2015    [facility]-Dr. Chriss Lott rt nasal sidewall    OTHER SURGICAL HISTORY      perirectal abscess 1992    PROSTATE SURGERY  4/05    biopsy       Outpatient Medications Marked as Taking for the 5/23/19 encounter (Office Visit) with Eron Dumont MD   Medication Sig Dispense Refill    atorvastatin (LIPITOR) 10 MG tablet Take 1 tablet by mouth daily 90 tablet 3    aspirin 81 MG tablet Take 81 mg by mouth daily.  Multiple Vitamins-Minerals (MULTI FOR HIM 50+) TABS Take  by mouth.  Coenzyme Q-10 100 MG CAPS Take 1 capsule by mouth daily. Allergies   Allergen Reactions    Percocet [Oxycodone-Acetaminophen] Other (See Comments)     constipation         Physical Examination     Gen, NAD  The following were examined and determined to be normal: Psych/Neuro, Neck, RLE, LLE, Nails/digit, Scalp/hair, Breast/axilla/chest and Back. face    The following were examined and determined to be abnormal: LUE, RUE  Scar clear  L medial knee area with firm dermal pinkish papule   Upper arms with few 2 mm erythematous pustules  Dorsum of the L hand with scaly pinkish-tan thin papule remaining    Assessment and Plan     1. C/w ISK - L hand - nearly clear; focally remaining  - lesion(s)  treated with liquid nitrogen x 2 cycles. Patient educated on risk of blister, hypopigmentation/scar and wound care. 2. Very mild focal folliculitis - Few follicular pustules on the upper arms - reassured but call if worsening or tender  - can try antibacterial soap     3. L medial knee - DF  - reassured regarding benign appearance    4.  Hx of NMSC, no signs recurrence  - educ sun protection   - encouraged skin checks yearly (sooner if indicated), self check    Previously discussed - Small epidermoid cyst - scrotum

## 2019-05-30 ENCOUNTER — TELEPHONE (OUTPATIENT)
Dept: FAMILY MEDICINE CLINIC | Age: 64
End: 2019-05-30

## 2019-05-31 DIAGNOSIS — R00.2 PALPITATION: Primary | ICD-10-CM

## 2019-06-05 ENCOUNTER — TELEPHONE (OUTPATIENT)
Dept: DERMATOLOGY | Age: 64
End: 2019-06-05

## 2019-06-05 NOTE — TELEPHONE ENCOUNTER
Patient called and would like the codes that is associated with his visit on 5/23.     Call back# [phone]

## 2019-06-07 NOTE — TELEPHONE ENCOUNTER
Patient called back today. He was requesting the CPT codes for date of service 5-23-19. I informed him you were out and would return call next week. If you are permitted to do so he would like it left on his cell phone. He can be reached at [phone].   Thanks

## 2019-07-08 ENCOUNTER — TELEPHONE (OUTPATIENT)
Dept: FAMILY MEDICINE CLINIC | Age: 64
End: 2019-07-08

## 2019-07-16 ENCOUNTER — OFFICE VISIT (OUTPATIENT)
Dept: FAMILY MEDICINE CLINIC | Age: 64
End: 2019-07-16
Payer: COMMERCIAL

## 2019-07-16 VITALS
HEIGHT: 71 IN | DIASTOLIC BLOOD PRESSURE: 90 MMHG | BODY MASS INDEX: 28.84 KG/M2 | OXYGEN SATURATION: 98 % | WEIGHT: 206 LBS | SYSTOLIC BLOOD PRESSURE: 130 MMHG | HEART RATE: 69 BPM

## 2019-07-16 DIAGNOSIS — G45.4 TRANSIENT GLOBAL AMNESIA: ICD-10-CM

## 2019-07-16 DIAGNOSIS — Q21.12 PFO (PATENT FORAMEN OVALE): ICD-10-CM

## 2019-07-16 PROCEDURE — 99213 OFFICE O/P EST LOW 20 MIN: CPT | Performed by: FAMILY MEDICINE

## 2019-07-16 RX ORDER — CLOPIDOGREL BISULFATE 75 MG/1
75 TABLET ORAL DAILY
Qty: 30 TABLET | Refills: 3 | Status: SHIPPED | OUTPATIENT
Start: 2019-07-16 | End: 2019-08-15 | Stop reason: SDUPTHER

## 2019-07-16 RX ORDER — CLOPIDOGREL BISULFATE 75 MG/1
75 TABLET ORAL DAILY
COMMUNITY
End: 2019-07-16 | Stop reason: SDUPTHER

## 2019-07-16 ASSESSMENT — ENCOUNTER SYMPTOMS
ABDOMINAL PAIN: 0
NAUSEA: 0
BACK PAIN: 0
VISUAL CHANGE: 0
SHORTNESS OF BREATH: 0
BOWEL INCONTINENCE: 0
VOMITING: 0

## 2019-08-01 ENCOUNTER — TELEPHONE (OUTPATIENT)
Dept: PULMONOLOGY | Age: 64
End: 2019-08-01

## 2019-08-02 ENCOUNTER — OFFICE VISIT (OUTPATIENT)
Dept: CARDIOLOGY CLINIC | Age: 64
End: 2019-08-02
Payer: COMMERCIAL

## 2019-08-02 ENCOUNTER — TELEPHONE (OUTPATIENT)
Dept: CARDIOLOGY CLINIC | Age: 64
End: 2019-08-02

## 2019-08-02 VITALS
SYSTOLIC BLOOD PRESSURE: 132 MMHG | OXYGEN SATURATION: 97 % | DIASTOLIC BLOOD PRESSURE: 78 MMHG | HEART RATE: 63 BPM | BODY MASS INDEX: 28.87 KG/M2 | WEIGHT: 207 LBS

## 2019-08-02 DIAGNOSIS — R00.2 PALPITATIONS: ICD-10-CM

## 2019-08-02 DIAGNOSIS — G45.9 TIA (TRANSIENT ISCHEMIC ATTACK): ICD-10-CM

## 2019-08-02 DIAGNOSIS — I49.1 PAC (PREMATURE ATRIAL CONTRACTION): ICD-10-CM

## 2019-08-02 DIAGNOSIS — Q21.12 PFO (PATENT FORAMEN OVALE): Primary | ICD-10-CM

## 2019-08-02 DIAGNOSIS — I49.3 PVC (PREMATURE VENTRICULAR CONTRACTION): ICD-10-CM

## 2019-08-02 DIAGNOSIS — I47.1 SVT (SUPRAVENTRICULAR TACHYCARDIA) (HCC): ICD-10-CM

## 2019-08-02 PROCEDURE — 93000 ELECTROCARDIOGRAM COMPLETE: CPT | Performed by: INTERNAL MEDICINE

## 2019-08-02 PROCEDURE — 99204 OFFICE O/P NEW MOD 45 MIN: CPT | Performed by: INTERNAL MEDICINE

## 2019-08-02 RX ORDER — LISINOPRIL 5 MG/1
5 TABLET ORAL DAILY
Qty: 90 TABLET | Refills: 3 | Status: SHIPPED | OUTPATIENT
Start: 2019-08-02 | End: 2019-10-24 | Stop reason: SDUPTHER

## 2019-08-06 ENCOUNTER — OFFICE VISIT (OUTPATIENT)
Dept: ORTHOPEDIC SURGERY | Age: 64
End: 2019-08-06
Payer: COMMERCIAL

## 2019-08-06 VITALS
SYSTOLIC BLOOD PRESSURE: 133 MMHG | BODY MASS INDEX: 28.98 KG/M2 | WEIGHT: 207 LBS | DIASTOLIC BLOOD PRESSURE: 74 MMHG | HEIGHT: 71 IN | HEART RATE: 56 BPM

## 2019-08-06 DIAGNOSIS — M25.562 LEFT KNEE PAIN, UNSPECIFIED CHRONICITY: Primary | ICD-10-CM

## 2019-08-06 PROCEDURE — 20610 DRAIN/INJ JOINT/BURSA W/O US: CPT | Performed by: ORTHOPAEDIC SURGERY

## 2019-08-06 RX ORDER — TRIAMCINOLONE ACETONIDE 40 MG/ML
40 INJECTION, SUSPENSION INTRA-ARTICULAR; INTRAMUSCULAR ONCE
Status: COMPLETED | OUTPATIENT
Start: 2019-08-06 | End: 2019-08-06

## 2019-08-06 RX ADMIN — TRIAMCINOLONE ACETONIDE 40 MG: 40 INJECTION, SUSPENSION INTRA-ARTICULAR; INTRAMUSCULAR at 15:43

## 2019-08-06 NOTE — PROGRESS NOTES
0.25% marcaine, and 1cc of Kenalog (40mg/ml). There were no immediate complications following the injection. The patient was advised of the possibility of injection site reaction and instructed to apply ice to the area and take NSAIDs if able. If he has continued pain, will need to consider MRI, as there was slight suggestion of meniscal tear on prior exam.      Ice prn. I did suggest continuing NSAIDs prn as I did at last visit, but it appears he has been started on Plavix since last year for PFO/TIA. Should not be on NSAIDs then. He was advised to check with Dr. Dejon Rick

## 2019-08-15 RX ORDER — CLOPIDOGREL BISULFATE 75 MG/1
75 TABLET ORAL DAILY
Qty: 90 TABLET | Refills: 3 | Status: SHIPPED | OUTPATIENT
Start: 2019-08-15 | End: 2020-09-04

## 2019-09-19 PROCEDURE — 93228 REMOTE 30 DAY ECG REV/REPORT: CPT | Performed by: INTERNAL MEDICINE

## 2019-09-20 ENCOUNTER — TELEPHONE (OUTPATIENT)
Dept: CARDIOLOGY CLINIC | Age: 64
End: 2019-09-20

## 2019-09-20 DIAGNOSIS — I47.1 SVT (SUPRAVENTRICULAR TACHYCARDIA) (HCC): ICD-10-CM

## 2019-09-20 DIAGNOSIS — R00.2 PALPITATIONS: ICD-10-CM

## 2019-09-20 DIAGNOSIS — G45.9 TIA (TRANSIENT ISCHEMIC ATTACK): Primary | ICD-10-CM

## 2019-09-20 DIAGNOSIS — I49.1 PAC (PREMATURE ATRIAL CONTRACTION): ICD-10-CM

## 2019-09-20 DIAGNOSIS — Q21.12 PFO (PATENT FORAMEN OVALE): ICD-10-CM

## 2019-09-20 DIAGNOSIS — I49.3 PVC (PREMATURE VENTRICULAR CONTRACTION): ICD-10-CM

## 2019-09-24 NOTE — PROGRESS NOTES
AST 32 2018    ALT 44 2018       EC19  Marked sinus  Bradycardia , PVC    Echo: 19  Summary   There is evidence of right-to-left intracardiac shunting/patent foramen   ovale by agitated saline bubble contrast study. Normal left ventricular size, wall thickness & systolic function. Normal left ventricular inflow profile. There are no significant segmental wall motion abnormalities. The left ventricular ejection fraction is calculated at 75 %. Signature  Cath:     Medication:  Current Outpatient Medications   Medication Sig Dispense Refill    clopidogrel (PLAVIX) 75 MG tablet Take 1 tablet by mouth daily 90 tablet 3    lisinopril (PRINIVIL;ZESTRIL) 5 MG tablet Take 1 tablet by mouth daily 90 tablet 3    atorvastatin (LIPITOR) 10 MG tablet Take 1 tablet by mouth daily 90 tablet 3    hydrocortisone (WESTCORT) 0.2 % cream Apply topically 2 times daily. 30 g 2    Multiple Vitamins-Minerals (MULTI FOR HIM 50+) TABS Take  by mouth.  Coenzyme Q-10 100 MG CAPS Take 1 capsule by mouth daily. Current Facility-Administered Medications   Medication Dose Route Frequency Provider Last Rate Last Dose    triamcinolone acetonide (KENALOG-40) injection 40 mg  40 mg Intra-Lesional Once Cruz Kelly MD        triamcinolone acetonide (KENALOG) injection 10 mg  10 mg Intradermal Once Octavia Montana MD           Assessment and plan:   SVT/PAC/PVC  Not symptomatic  Seen on a 48hr monitor and event monitor    TIA   Cryptogenic   Describes symptoms suggestive of Atrial fibrillation    Will schedule a ILR for further monitoring of arrhythmia and possible Atrial fibrillation     PFO  Followed by Dr.Rapp ISAACS    On statin      I independently reviewed * holter MCOT   Thank you for allowing me to participate in the care of Dk Alba. Further evaluation will be based upon the patient's clinical course and testing results.      All questions and concerns were addressed to the

## 2019-09-26 ENCOUNTER — OFFICE VISIT (OUTPATIENT)
Dept: CARDIOLOGY CLINIC | Age: 64
End: 2019-09-26
Payer: COMMERCIAL

## 2019-09-26 VITALS
SYSTOLIC BLOOD PRESSURE: 133 MMHG | BODY MASS INDEX: 27.44 KG/M2 | HEART RATE: 51 BPM | WEIGHT: 196 LBS | DIASTOLIC BLOOD PRESSURE: 80 MMHG | HEIGHT: 71 IN

## 2019-09-26 DIAGNOSIS — G45.9 TIA (TRANSIENT ISCHEMIC ATTACK): ICD-10-CM

## 2019-09-26 DIAGNOSIS — E78.5 HYPERLIPIDEMIA, UNSPECIFIED HYPERLIPIDEMIA TYPE: ICD-10-CM

## 2019-09-26 DIAGNOSIS — R00.2 PALPITATIONS: ICD-10-CM

## 2019-09-26 DIAGNOSIS — I47.1 SVT (SUPRAVENTRICULAR TACHYCARDIA) (HCC): Primary | ICD-10-CM

## 2019-09-26 DIAGNOSIS — Q21.12 PFO (PATENT FORAMEN OVALE): ICD-10-CM

## 2019-09-26 DIAGNOSIS — I49.3 PVC (PREMATURE VENTRICULAR CONTRACTION): ICD-10-CM

## 2019-09-26 DIAGNOSIS — I49.1 PAC (PREMATURE ATRIAL CONTRACTION): ICD-10-CM

## 2019-09-26 PROCEDURE — 99204 OFFICE O/P NEW MOD 45 MIN: CPT | Performed by: INTERNAL MEDICINE

## 2019-09-26 PROCEDURE — 93000 ELECTROCARDIOGRAM COMPLETE: CPT | Performed by: INTERNAL MEDICINE

## 2019-10-01 ENCOUNTER — OFFICE VISIT (OUTPATIENT)
Dept: DERMATOLOGY | Age: 64
End: 2019-10-01
Payer: COMMERCIAL

## 2019-10-01 ENCOUNTER — TELEPHONE (OUTPATIENT)
Dept: CARDIOLOGY CLINIC | Age: 64
End: 2019-10-01

## 2019-10-01 DIAGNOSIS — M71.30 MYXOID CYST: ICD-10-CM

## 2019-10-01 DIAGNOSIS — Z85.828 HISTORY OF NONMELANOMA SKIN CANCER: Primary | ICD-10-CM

## 2019-10-01 DIAGNOSIS — D48.5 NEOPLASM OF UNCERTAIN BEHAVIOR OF SKIN: ICD-10-CM

## 2019-10-01 DIAGNOSIS — D23.9 DERMATOFIBROMA: ICD-10-CM

## 2019-10-01 DIAGNOSIS — L57.0 AK (ACTINIC KERATOSIS): ICD-10-CM

## 2019-10-01 DIAGNOSIS — D22.9 MULTIPLE NEVI: ICD-10-CM

## 2019-10-01 DIAGNOSIS — L82.1 SEBORRHEIC KERATOSIS: ICD-10-CM

## 2019-10-01 PROCEDURE — 11102 TANGNTL BX SKIN SINGLE LES: CPT | Performed by: DERMATOLOGY

## 2019-10-01 PROCEDURE — 17000 DESTRUCT PREMALG LESION: CPT | Performed by: DERMATOLOGY

## 2019-10-01 PROCEDURE — 99213 OFFICE O/P EST LOW 20 MIN: CPT | Performed by: DERMATOLOGY

## 2019-10-01 PROCEDURE — 17003 DESTRUCT PREMALG LES 2-14: CPT | Performed by: DERMATOLOGY

## 2019-10-03 LAB — DERMATOLOGY PATHOLOGY REPORT: NORMAL

## 2019-10-18 LAB
ALBUMIN SERPL-MCNC: 3.8 G/DL
ALP BLD-CCNC: 90 U/L
ALT SERPL-CCNC: 23 U/L
ANION GAP SERPL CALCULATED.3IONS-SCNC: 1.5 MMOL/L
AST SERPL-CCNC: 33 U/L
BILIRUB SERPL-MCNC: 1.4 MG/DL (ref 0.1–1.4)
BUN BLDV-MCNC: 14.3 MG/DL
CALCIUM SERPL-MCNC: 9.5 MG/DL
CHLORIDE BLD-SCNC: 103 MMOL/L
CO2: 32 MMOL/L
CREAT SERPL-MCNC: 1 MG/DL
GFR CALCULATED: >60
GLUCOSE BLD-MCNC: 100 MG/DL
INR BLD: 2.53
POTASSIUM SERPL-SCNC: 4.6 MMOL/L
PROTIME: 25.4 SECONDS
SODIUM BLD-SCNC: 141 MMOL/L
TOTAL PROTEIN: 6.3

## 2019-10-22 ENCOUNTER — HOSPITAL ENCOUNTER (OUTPATIENT)
Dept: CARDIAC CATH/INVASIVE PROCEDURES | Age: 64
Discharge: HOME | End: 2019-10-22
Attending: INTERNAL MEDICINE | Admitting: INTERNAL MEDICINE
Payer: COMMERCIAL

## 2019-10-22 VITALS
DIASTOLIC BLOOD PRESSURE: 79 MMHG | BODY MASS INDEX: 27.44 KG/M2 | SYSTOLIC BLOOD PRESSURE: 149 MMHG | WEIGHT: 196 LBS | RESPIRATION RATE: 18 BRPM | HEIGHT: 71 IN | HEART RATE: 64 BPM | TEMPERATURE: 98.5 F

## 2019-10-22 DIAGNOSIS — Z45.09 ENCOUNTER FOR LOOP RECORDER CHECK: Primary | ICD-10-CM

## 2019-10-22 PROCEDURE — 33285 INSJ SUBQ CAR RHYTHM MNTR: CPT

## 2019-10-22 PROCEDURE — C1764 EVENT RECORDER, CARDIAC: HCPCS

## 2019-10-22 PROCEDURE — 33285 INSJ SUBQ CAR RHYTHM MNTR: CPT | Performed by: INTERNAL MEDICINE

## 2019-10-23 ENCOUNTER — NURSE ONLY (OUTPATIENT)
Dept: FAMILY MEDICINE CLINIC | Age: 64
End: 2019-10-23

## 2019-10-23 ENCOUNTER — PROCEDURE VISIT (OUTPATIENT)
Dept: CARDIOLOGY CLINIC | Age: 64
End: 2019-10-23

## 2019-10-23 DIAGNOSIS — Z23 NEED FOR INFLUENZA VACCINATION: Primary | ICD-10-CM

## 2019-10-23 DIAGNOSIS — Z45.09 ENCOUNTER FOR LOOP RECORDER CHECK: ICD-10-CM

## 2019-10-27 ENCOUNTER — PATIENT MESSAGE (OUTPATIENT)
Dept: CARDIOLOGY CLINIC | Age: 64
End: 2019-10-27

## 2019-10-28 ENCOUNTER — NURSE ONLY (OUTPATIENT)
Dept: CARDIOLOGY CLINIC | Age: 64
End: 2019-10-28
Payer: COMMERCIAL

## 2019-10-28 DIAGNOSIS — R00.2 PALPITATION: ICD-10-CM

## 2019-10-28 DIAGNOSIS — Z45.09 ENCOUNTER FOR LOOP RECORDER CHECK: ICD-10-CM

## 2019-10-28 PROCEDURE — 93291 INTERROG DEV EVAL SCRMS IP: CPT | Performed by: INTERNAL MEDICINE

## 2019-11-05 ENCOUNTER — TELEPHONE (OUTPATIENT)
Dept: DERMATOLOGY | Age: 64
End: 2019-11-05

## 2019-11-12 ENCOUNTER — OFFICE VISIT (OUTPATIENT)
Dept: DERMATOLOGY | Age: 64
End: 2019-11-12
Payer: COMMERCIAL

## 2019-11-12 DIAGNOSIS — L98.9 SKIN EROSION: Primary | ICD-10-CM

## 2019-11-12 DIAGNOSIS — Z85.828 HISTORY OF NONMELANOMA SKIN CANCER: ICD-10-CM

## 2019-11-12 PROCEDURE — 99212 OFFICE O/P EST SF 10 MIN: CPT | Performed by: DERMATOLOGY

## 2019-11-21 ENCOUNTER — TELEPHONE (OUTPATIENT)
Dept: CARDIOLOGY CLINIC | Age: 64
End: 2019-11-21

## 2019-11-25 DIAGNOSIS — E78.5 HYPERLIPIDEMIA, UNSPECIFIED HYPERLIPIDEMIA TYPE: Primary | ICD-10-CM

## 2019-11-25 DIAGNOSIS — Z00.00 WELL ADULT EXAM: ICD-10-CM

## 2019-11-25 DIAGNOSIS — Z12.5 PROSTATE CANCER SCREENING: ICD-10-CM

## 2019-11-25 DIAGNOSIS — R73.9 HYPERGLYCEMIA: ICD-10-CM

## 2019-11-26 ENCOUNTER — TELEPHONE (OUTPATIENT)
Dept: CARDIOLOGY CLINIC | Age: 64
End: 2019-11-26

## 2019-11-29 DIAGNOSIS — Z00.00 WELL ADULT EXAM: ICD-10-CM

## 2019-11-29 DIAGNOSIS — Z12.5 PROSTATE CANCER SCREENING: ICD-10-CM

## 2019-11-29 DIAGNOSIS — R73.9 HYPERGLYCEMIA: ICD-10-CM

## 2019-11-29 LAB
A/G RATIO: 2.2 (ref 1.1–2.2)
ALBUMIN SERPL-MCNC: 4.4 G/DL (ref 3.4–5)
ALP BLD-CCNC: 92 U/L (ref 40–129)
ALT SERPL-CCNC: 27 U/L (ref 10–40)
ANION GAP SERPL CALCULATED.3IONS-SCNC: 13 MMOL/L (ref 3–16)
AST SERPL-CCNC: 26 U/L (ref 15–37)
BASOPHILS ABSOLUTE: 0 K/UL (ref 0–0.2)
BASOPHILS RELATIVE PERCENT: 0.7 %
BILIRUB SERPL-MCNC: 0.7 MG/DL (ref 0–1)
BUN BLDV-MCNC: 21 MG/DL (ref 7–20)
CALCIUM SERPL-MCNC: 9.4 MG/DL (ref 8.3–10.6)
CHLORIDE BLD-SCNC: 102 MMOL/L (ref 99–110)
CHOLESTEROL, TOTAL: 154 MG/DL (ref 0–199)
CO2: 25 MMOL/L (ref 21–32)
CREAT SERPL-MCNC: 1.1 MG/DL (ref 0.8–1.3)
EOSINOPHILS ABSOLUTE: 0.1 K/UL (ref 0–0.6)
EOSINOPHILS RELATIVE PERCENT: 2 %
ESTIMATED AVERAGE GLUCOSE: 111.2 MG/DL
GFR AFRICAN AMERICAN: >60
GFR NON-AFRICAN AMERICAN: >60
GLOBULIN: 2 G/DL
GLUCOSE BLD-MCNC: 79 MG/DL (ref 70–99)
HBA1C MFR BLD: 5.5 %
HCT VFR BLD CALC: 48.2 % (ref 40.5–52.5)
HDLC SERPL-MCNC: 63 MG/DL (ref 40–60)
HEMOGLOBIN: 16 G/DL (ref 13.5–17.5)
LDL CHOLESTEROL CALCULATED: 74 MG/DL
LYMPHOCYTES ABSOLUTE: 1.3 K/UL (ref 1–5.1)
LYMPHOCYTES RELATIVE PERCENT: 21.6 %
MCH RBC QN AUTO: 30.1 PG (ref 26–34)
MCHC RBC AUTO-ENTMCNC: 33.1 G/DL (ref 31–36)
MCV RBC AUTO: 91 FL (ref 80–100)
MONOCYTES ABSOLUTE: 0.7 K/UL (ref 0–1.3)
MONOCYTES RELATIVE PERCENT: 11.8 %
NEUTROPHILS ABSOLUTE: 3.8 K/UL (ref 1.7–7.7)
NEUTROPHILS RELATIVE PERCENT: 63.9 %
PDW BLD-RTO: 13.4 % (ref 12.4–15.4)
PLATELET # BLD: 216 K/UL (ref 135–450)
PMV BLD AUTO: 8.2 FL (ref 5–10.5)
POTASSIUM SERPL-SCNC: 4.3 MMOL/L (ref 3.5–5.1)
PROSTATE SPECIFIC ANTIGEN: 5.66 NG/ML (ref 0–4)
RBC # BLD: 5.29 M/UL (ref 4.2–5.9)
SODIUM BLD-SCNC: 140 MMOL/L (ref 136–145)
TOTAL PROTEIN: 6.4 G/DL (ref 6.4–8.2)
TRIGL SERPL-MCNC: 85 MG/DL (ref 0–150)
VLDLC SERPL CALC-MCNC: 17 MG/DL
WBC # BLD: 5.9 K/UL (ref 4–11)

## 2019-12-03 ENCOUNTER — NURSE ONLY (OUTPATIENT)
Dept: CARDIOLOGY CLINIC | Age: 64
End: 2019-12-03
Payer: COMMERCIAL

## 2019-12-03 DIAGNOSIS — R00.2 PALPITATION: ICD-10-CM

## 2019-12-03 DIAGNOSIS — Z45.09 ENCOUNTER FOR LOOP RECORDER CHECK: ICD-10-CM

## 2019-12-03 PROCEDURE — 93299 PR REM INTERROG ICPMS/SCRMS <30 D TECH REVIEW: CPT | Performed by: INTERNAL MEDICINE

## 2019-12-03 PROCEDURE — 93298 REM INTERROG DEV EVAL SCRMS: CPT | Performed by: INTERNAL MEDICINE

## 2019-12-04 ENCOUNTER — OFFICE VISIT (OUTPATIENT)
Dept: FAMILY MEDICINE CLINIC | Age: 64
End: 2019-12-04
Payer: COMMERCIAL

## 2019-12-04 VITALS
BODY MASS INDEX: 27.42 KG/M2 | OXYGEN SATURATION: 98 % | DIASTOLIC BLOOD PRESSURE: 82 MMHG | WEIGHT: 196.6 LBS | HEART RATE: 64 BPM | SYSTOLIC BLOOD PRESSURE: 124 MMHG

## 2019-12-04 DIAGNOSIS — Z01.818 PRE-OP EXAM: Primary | ICD-10-CM

## 2019-12-04 DIAGNOSIS — R97.20 PSA ELEVATION: ICD-10-CM

## 2019-12-04 DIAGNOSIS — M67.479 GANGLION CYST OF FOOT: ICD-10-CM

## 2019-12-04 DIAGNOSIS — Z23 NEED FOR PNEUMOCOCCAL VACCINE: ICD-10-CM

## 2019-12-04 PROCEDURE — 90471 IMMUNIZATION ADMIN: CPT | Performed by: FAMILY MEDICINE

## 2019-12-04 PROCEDURE — 99214 OFFICE O/P EST MOD 30 MIN: CPT | Performed by: FAMILY MEDICINE

## 2019-12-04 PROCEDURE — 90732 PPSV23 VACC 2 YRS+ SUBQ/IM: CPT | Performed by: FAMILY MEDICINE

## 2019-12-04 RX ORDER — ROSUVASTATIN CALCIUM 5 MG/1
5 TABLET, COATED ORAL DAILY
Qty: 90 TABLET | Refills: 3 | Status: SHIPPED | OUTPATIENT
Start: 2019-12-04 | End: 2020-02-24 | Stop reason: SDUPTHER

## 2019-12-04 RX ORDER — CHLORPHENIRAMINE MALEATE 4 MG/1
4 TABLET ORAL EVERY 6 HOURS PRN
COMMUNITY

## 2019-12-10 ENCOUNTER — TELEPHONE (OUTPATIENT)
Dept: CARDIOLOGY CLINIC | Age: 64
End: 2019-12-10

## 2019-12-17 ENCOUNTER — TELEPHONE (OUTPATIENT)
Dept: CARDIOLOGY CLINIC | Age: 64
End: 2019-12-17

## 2019-12-18 RX ORDER — LOSARTAN POTASSIUM 100 MG/1
100 TABLET ORAL DAILY
Qty: 30 TABLET | Refills: 5 | Status: SHIPPED | OUTPATIENT
Start: 2019-12-18 | End: 2020-01-10 | Stop reason: SDUPTHER

## 2020-01-10 ENCOUNTER — TELEPHONE (OUTPATIENT)
Dept: CARDIOLOGY CLINIC | Age: 65
End: 2020-01-10

## 2020-01-14 RX ORDER — LOSARTAN POTASSIUM 100 MG/1
100 TABLET ORAL DAILY
Qty: 90 TABLET | Refills: 3 | OUTPATIENT
Start: 2020-01-14

## 2020-01-22 NOTE — PROGRESS NOTES
[address]   Electrophysiology Follow up   Date: 1/30/2020      Chief Complaint   Patient presents with    3 Month Follow-Up     Post Loop    Tachycardia        HPI: Edda Carbajal is a 75 y.o. male with a PMH of HLD and asthma. He is referred today to discuss an ILR implantation at the request of Dr. Ezequiel Diaz. He was seen at [address] [place] [address] with concerns for a TIA . He was found to have a PFO and has had 2 episodes of TIA in the past.    Also reports 2 episodes of what he describes as irregular heart beat with rate from 120-60 for almost an hour 6 and 12 months ago. event monitor and Holters have shown short atrial runs      Today:  He feels fine   No palpitations or syncope    Past Medical History:   Diagnosis Date    Basal cell carcinoma of right nasal sidewall 6/4/2015    BPH (benign prostatic hyperplasia) 10/18/2010    Colon, diverticulosis 12/24/2018    Hyperlipidemia 10/18/2010    Nephrolithiasis     s/p lithotripsy x 4, calcium oxalate.  Raynaud's disease 12/4/2014    Wears glasses         Past Surgical History:   Procedure Laterality Date    COLONOSCOPY  2007    q 10,5/2018    CYSTOSCOPY  11/14/13     CYSTOSCOPY TRANSURETHRAL RESECTION PROSTATE    HERNIA REPAIR      KIDNEY STONE SURGERY Left 8/29/13    perc. nephrolithotomy,stint placement    MOHS SURGERY  [facility]    [facility] Alon[facility]wall    OTHER SURGICAL HISTORY      perirectal abscess 1992    PROSTATE SURGERY  4/05    biopsy       Allergies: Allergies   Allergen Reactions    Percocet [Oxycodone-Acetaminophen] Other (See Comments)     constipation       Social History:   reports that he has never smoked. He has never used smokeless tobacco. He reports that he does not drink alcohol or use drugs. Family History:  family history includes Cancer in his mother; Heart Disease in his father; Hypertension in his father. Reviewed.  Denies family history of sudden cardiac death, arrhythmia, premature CAD    Review of System:  All other systems reviewed and are negative except for that noted above. Pertinent negatives are:   General: negative for fever, chills   Ophthalmic ROS: negative for - eye pain or loss of vision  ENT ROS: negative for - headaches, sore throat   Respiratory: negative for - cough, sputum  Cardiovascular: Reviewed in HPI  Gastrointestinal: negative for - abdominal pain, diarrhea, N/V  Hematology: negative for - bleeding, blood clots, bruising or jaundice  Genito-Urinary:  negative for - Dysuria or incontinence  Musculoskeletal: negative for - Joint swelling, muscle pain  Neurological: negative for - confusion, dizziness, headaches   Psychiatric: No anxiety, no depression. Dermatological: negative for - rash    Physical Examination:  Vitals:    20 1528   BP: 134/80   Pulse: 62   Resp: 18      Wt Readings from Last 3 Encounters:   20 200 lb (90.7 kg)   19 196 lb 9.6 oz (89.2 kg)   10/22/19 196 lb (88.9 kg)       · Constitutional: Oriented. No distress. · Head: Normocephalic and atraumatic. · Mouth/Throat: Oropharynx is clear and moist.   · Eyes: Conjunctivae normal. EOM are normal.   · Neck: Neck supple. No rigidity. No JVD present. · Cardiovascular: Normal rate, regular rhythm, S1&S2. · Pulmonary/Chest: Bilateral respiratory sounds. No wheezes, No rhonchi. · Abdominal: Soft. Bowel sounds present. No distension, No tenderness. · Musculoskeletal: No tenderness. No edema    · Lymphadenopathy: Has no cervical adenopathy. · Neurological: Alert and oriented. Cranial nerve appears intact, No Gross deficit   · Skin: Skin is warm and dry. No rash noted. · Psychiatric: Has a normal behavior       Labs, diagnostic and imaging results reviewed. Reviewed.    Lab Results   Component Value Date    TSH 2.54 2019    TSH 2.95 2016    CREATININE 1.1 2019    CREATININE 1 10/18/2019    AST 26 2019    ALT 27 2019       EC20  Sinus Wilhelmenia Elders

## 2020-01-30 ENCOUNTER — NURSE ONLY (OUTPATIENT)
Dept: CARDIOLOGY CLINIC | Age: 65
End: 2020-01-30
Payer: COMMERCIAL

## 2020-01-30 ENCOUNTER — OFFICE VISIT (OUTPATIENT)
Dept: CARDIOLOGY CLINIC | Age: 65
End: 2020-01-30
Payer: COMMERCIAL

## 2020-01-30 VITALS
DIASTOLIC BLOOD PRESSURE: 80 MMHG | HEART RATE: 62 BPM | HEIGHT: 71 IN | SYSTOLIC BLOOD PRESSURE: 134 MMHG | RESPIRATION RATE: 18 BRPM | WEIGHT: 200 LBS | BODY MASS INDEX: 28 KG/M2

## 2020-01-30 PROCEDURE — 93000 ELECTROCARDIOGRAM COMPLETE: CPT | Performed by: INTERNAL MEDICINE

## 2020-01-30 PROCEDURE — 93291 INTERROG DEV EVAL SCRMS IP: CPT | Performed by: INTERNAL MEDICINE

## 2020-01-30 PROCEDURE — 99214 OFFICE O/P EST MOD 30 MIN: CPT | Performed by: INTERNAL MEDICINE

## 2020-01-31 NOTE — PROGRESS NOTES
Patient comes in for interrogation of their implanted loop recorder. Interrogation shows [id number] episodes that appear to be short runs of AT. Patient will see Dr. Yi Cochran today.

## 2020-02-05 ENCOUNTER — OFFICE VISIT (OUTPATIENT)
Dept: RHEUMATOLOGY | Age: 65
End: 2020-02-05
Payer: COMMERCIAL

## 2020-02-05 VITALS
SYSTOLIC BLOOD PRESSURE: 124 MMHG | DIASTOLIC BLOOD PRESSURE: 76 MMHG | BODY MASS INDEX: 27.86 KG/M2 | HEIGHT: 71 IN | WEIGHT: 199 LBS

## 2020-02-05 PROCEDURE — 99203 OFFICE O/P NEW LOW 30 MIN: CPT | Performed by: INTERNAL MEDICINE

## 2020-02-05 NOTE — PROGRESS NOTES
CONSULTATION:  Patient is being seen at the request of  Susan Foreman MD for evaluation of any rheumatic diseases. HISTORY OF PRESENT ILLNESS:  75 y.o. male who has been experiencing chronic nasal discharge and the need to clear his throat all the time specially in the morning, gets better as the day progresses, has been experiencing that for at least the past 5 to 10 years. He was evaluated by different specialists including primary care, pulmonologist, allergist, is on ipratropium bromide as well as nasal decongestant that has helped with some of the symptoms. His wife found out that 1 of her colleague had similar symptoms, saw her rheumatologist, and had autoimmune disease and was treated and everything got better, therefore he made this appointment. Other than above, he also reports intercurrent joint pain at the base of the thumb, knees, feet, mild, manageable. He also notices cramping of the legs, managed with some kind of over-the-counter spray. No swollen or inflamed joints. Denies any sicca symptoms. No rashes. Pertinent ROS: Denies weight loss, objective fever, skin rashes or skin thickening, photosensitivity Raynauds, focal alopecia, recurrent ocular congestion, dry eyes or mouth, or mucosal ulcers, tinnitus or recent hearing loss. Denies chest pain, palpitations, cough, pleurisy, dysphagia, or features of inflammatory bowel diseases. No h/o blood clots or bleeding disorders. No renal or genitourinary problems. No focal weakness or persistent paresthesia. All other ROS are negative. Labs-GORAN checked in 2011-. Past Medical History:   Diagnosis Date    Basal cell carcinoma of right nasal sidewall 6/4/2015    BPH (benign prostatic hyperplasia) 10/18/2010    Colon, diverticulosis 12/24/2018    Hyperlipidemia 10/18/2010    Nephrolithiasis     s/p lithotripsy x 4, calcium oxalate.      Raynaud's disease 12/4/2014    Wears glasses      Past Surgical History:   Procedure Laterality reveal no evidence of abnormality. External inspection of the ears and nose within normal limits. Neck: No masses or asymmetry. No thyroid enlargement. Chest: Normal effort, clear to auscultation. DATA:   Lab Results   Component Value Date    WBC 5.9 11/29/2019    HGB 16.0 11/29/2019    HCT 48.2 11/29/2019    MCV 91.0 11/29/2019     11/29/2019         Chemistry        Component Value Date/Time     11/29/2019 1204    K 4.3 11/29/2019 1204     11/29/2019 1204    CO2 25 11/29/2019 1204    BUN 21 (H) 11/29/2019 1204    CREATININE 1.1 11/29/2019 1204        Component Value Date/Time    CALCIUM 9.4 11/29/2019 1204    ALKPHOS 92 11/29/2019 1204    AST 26 11/29/2019 1204    ALT 27 11/29/2019 1204    BILITOT 0.7 11/29/2019 1204          Lab Results   Component Value Date    LABURIC 7.7 (H) 03/09/2016     Lab Results   Component Value Date    SEDRATE 5 11/23/2011     Lab Results   Component Value Date    CRP 0.6 11/20/2014     Lab Results   Component Value Date    GORAN Negative 11/23/2011    SEDRATE 5 11/23/2011     Lab Results   Component Value Date    CKTOTAL 109 11/07/2018     Lab Results   Component Value Date    TSH 2.54 03/27/2019     Lab Results   Component Value Date    VITD25 40.0 11/07/2018         Radiology Review:      A/P- See above.

## 2020-02-11 ENCOUNTER — OFFICE VISIT (OUTPATIENT)
Dept: FAMILY MEDICINE CLINIC | Age: 65
End: 2020-02-11
Payer: COMMERCIAL

## 2020-02-11 VITALS
BODY MASS INDEX: 27.34 KG/M2 | HEART RATE: 65 BPM | DIASTOLIC BLOOD PRESSURE: 80 MMHG | OXYGEN SATURATION: 98 % | WEIGHT: 196 LBS | SYSTOLIC BLOOD PRESSURE: 144 MMHG

## 2020-02-11 PROCEDURE — 99213 OFFICE O/P EST LOW 20 MIN: CPT | Performed by: FAMILY MEDICINE

## 2020-02-11 ASSESSMENT — ENCOUNTER SYMPTOMS
RHINORRHEA: 0
ABDOMINAL PAIN: 0
NAUSEA: 0
CONSTIPATION: 0
VISUAL CHANGE: 0
PHOTOPHOBIA: 0
BACK PAIN: 0
VOMITING: 0
DIARRHEA: 0
TROUBLE SWALLOWING: 0

## 2020-02-11 ASSESSMENT — PATIENT HEALTH QUESTIONNAIRE - PHQ9
1. LITTLE INTEREST OR PLEASURE IN DOING THINGS: 0
2. FEELING DOWN, DEPRESSED OR HOPELESS: 0
SUM OF ALL RESPONSES TO PHQ QUESTIONS 1-9: 0
SUM OF ALL RESPONSES TO PHQ QUESTIONS 1-9: 0
SUM OF ALL RESPONSES TO PHQ9 QUESTIONS 1 & 2: 0

## 2020-02-11 NOTE — PROGRESS NOTES
[address] [facility]  Office Visit      Patient: Evangelina Dee is a 75 y.o. male who presents today with the following Chief Complaint(s):  Chief Complaint   Patient presents with    Mass     Back of head, left side. About 2 weeks. Comes and goes but this is the longes       Neck Pain    This is a new problem. Episode onset: 2 weeks ago. The problem occurs constantly. The problem has been unchanged. The pain is associated with a sleep position. The pain is present in the occipital region. The quality of the pain is described as aching. The pain is mild. The symptoms are aggravated by twisting and position. The pain is same all the time. Stiffness is present in the morning. Associated symptoms include headaches. Pertinent negatives include no chest pain, fever, leg pain, numbness, pain with swallowing, paresis, photophobia, syncope, tingling, trouble swallowing, visual change, weakness or weight loss. He has tried nothing for the symptoms. Improvement on treatment: n/a. No injury or trauma  Has tenderness near occipital prominence of skull      Current Outpatient Medications   Medication Sig Dispense Refill    losartan (COZAAR) 100 MG tablet Take 1 tablet by mouth daily 90 tablet 2    IPRATROPIUM BROMIDE NA by Nasal route      chlorpheniramine (CHLOR-TRIMETON) 4 MG tablet Take 4 mg by mouth every 6 hours as needed for Allergies      rosuvastatin (CRESTOR) 5 MG tablet Take 1 tablet by mouth daily 90 tablet 3    clopidogrel (PLAVIX) 75 MG tablet Take 1 tablet by mouth daily 90 tablet 3    hydrocortisone (WESTCORT) 0.2 % cream Apply topically 2 times daily. 30 g 2    Multiple Vitamins-Minerals (MULTI FOR HIM 50+) TABS Take  by mouth.  Coenzyme Q-10 100 MG CAPS Take 1 capsule by mouth daily.        Current Facility-Administered Medications   Medication Dose Route Frequency Provider Last Rate Last Dose    triamcinolone acetonide (KENALOG-40) injection 40 mg  40 mg Intra-Lesional Once Blayne Christian Rodríguez Gutierrez MD        triamcinolone acetonide (KENALOG) injection 10 mg  10 mg Intradermal Once Mary Bender MD           Past Medical History:   Diagnosis Date    Basal cell carcinoma of right nasal sidewall 2015    BPH (benign prostatic hyperplasia) 10/18/2010    Colon, diverticulosis 2018    Hyperlipidemia 10/18/2010    Nephrolithiasis     s/p lithotripsy x 4, calcium oxalate.  Raynaud's disease 2014    Wears glasses      Past Surgical History:   Procedure Laterality Date    COLONOSCOPY      q 10,2018    CYSTOSCOPY  13     CYSTOSCOPY TRANSURETHRAL RESECTION PROSTATE    HERNIA REPAIR      KIDNEY STONE SURGERY Left 13    perc. nephrolithotomy,stint placement    MOHS SURGERY  2015    [facility]-Dr. Rodríguez Gutierrez rt nasal sidewall    OTHER SURGICAL HISTORY      perirectal abscess     PROSTATE SURGERY      biopsy     Family History   Problem Relation Age of Onset    Hypertension Father     Heart Disease Father         valvular heart disease    Cancer Mother         lymphoma  80     Social History     Tobacco Use    Smoking status: Never Smoker    Smokeless tobacco: Never Used   Substance Use Topics    Alcohol use: No     Social History     Patient does not qualify to have social determinant information on file (likely too young). Social History Narrative    Likes job>50/w( at [employer])    Exercise daily     happily     All kids out of town; scattered-4 grandchildren    + seatbelts    Hobbies-astronomy--?  and Orthodoxy     Social History     Substance and Sexual Activity   Sexual Activity Yes    Partners: Female          Patient's past medical history, surgical history, family history, medications,  andallergies  were all reviewed and updated as appropriate today. Review of Systems   Constitutional: Negative for activity change, appetite change, chills, fatigue, fever and weight loss.    HENT: Negative for congestion, postnasal Gait: Gait normal.      Deep Tendon Reflexes: Reflexes normal.             Assessment:  Encounter Diagnosis   Name Primary?  Acute cervical myofascial strain, initial encounter Yes       Plan:  1. Acute cervical myofascial strain, initial encounter  - start nsaids prn along with ice / heat  - given home exercises / stretches to perform  - follow-up as needed -- discussed reasons to call / return      Return if symptoms worsen or fail to improve. Basilio Rinne  [facility], [address] [facility]  2/11/2020

## 2020-02-12 ENCOUNTER — OFFICE VISIT (OUTPATIENT)
Dept: CARDIOLOGY CLINIC | Age: 65
End: 2020-02-12
Payer: COMMERCIAL

## 2020-02-12 VITALS
SYSTOLIC BLOOD PRESSURE: 128 MMHG | DIASTOLIC BLOOD PRESSURE: 62 MMHG | HEART RATE: 84 BPM | BODY MASS INDEX: 27.61 KG/M2 | HEIGHT: 71 IN | WEIGHT: 197.2 LBS

## 2020-02-12 PROCEDURE — 99213 OFFICE O/P EST LOW 20 MIN: CPT | Performed by: INTERNAL MEDICINE

## 2020-02-12 NOTE — PROGRESS NOTES
sweats. No abnormal changes in weight. HEENT: No blurry or decreased vision. No changes in hearing, nasal discharge or sore throat. Cardiovascular: See HPI. No cramping in legs or buttocks when walking. Respiratory: No cough, hemoptysis, or wheezing. No history of asthma. Gastrointestinal: No abdominal pain, hematochezia, melana, or history of GI ulcers. Genito-Urinary: No dysuria or hematuria. No urgency or polyuria. Musculoskeletal: No complaints of joint pain, joint swelling or muscular weakness/soreness. Neurological: No dizziness or headaches. No numbness/tingling, speech problems or weakness. No history of a stroke or TIA. Psychological: No anxiety or depression  Hematological and Lymphatic: No abnormal bleeding or bruising, blood clots, jaundice. Endocrine: No malaise/lethargy, palpitations, polydipsia/polyuria, temperature intolerance or unexpected weight changes. Skin: No rashes or non-healing ulcers. PE:  Blood pressure 128/62, pulse 84, height 5' 11\" (1.803 m), weight 197 lb 3.2 oz (89.4 kg). General (appearance): Well devel. No distress. Eyes: Anicteric. EOMI  Neck: supple. No jvd  Ears/Nose/Mouth/Thorat: No cyanosis  CV: RRR. No m/r/g   Respiratory:  Clear b, normal respiratory effort  GI: abd s/nt/nd. No peritoneal signs  Skin: Warm, dry. No rashes  Neuro/Psych: Alert and oriented x 3. Appropriate behavior  Ext:  No c/c. No pitting edema  Pulses:  2+ carotid.  No bruits    Lab Results   Component Value Date    WBC 5.9 11/29/2019    HGB 16.0 11/29/2019    HCT 48.2 11/29/2019    MCV 91.0 11/29/2019     11/29/2019     Lab Results   Component Value Date     11/29/2019    K 4.3 11/29/2019     11/29/2019    CO2 25 11/29/2019    BUN 21 (H) 11/29/2019    CREATININE 1.1 11/29/2019    GLUCOSE 79 11/29/2019    CALCIUM 9.4 11/29/2019    PROT 6.4 11/29/2019    LABALBU 4.4 11/29/2019    BILITOT 0.7 11/29/2019    ALKPHOS 92 11/29/2019    AST 26 11/29/2019    ALT 27 Rfl:     hydrocortisone (WESTCORT) 0.2 % cream, Apply topically 2 times daily. (Patient not taking: Reported on 2/12/2020), Disp: 30 g, Rfl: 2    A/P:  75 y.o. here for TIA. Has PFO as well. 1. PFO (patent foramen ovale)    2. TIA (transient ischemic attack)    3. Hyperlipidemia, unspecified hyperlipidemia type    4. PAC (premature atrial contraction)    5. PVC (premature ventricular contraction)      Recs:  -Will leave bp meds alone now, but if he is consistently over 130, would start chlorthalidone  -Cont statin, losartan, and plavix  -No stroke on BIBI; no plans for pfo closure. -F/U as needed or in 1 year, sooner if an issue. Vikki Fung MD, [facility]

## 2020-02-24 RX ORDER — ROSUVASTATIN CALCIUM 5 MG/1
5 TABLET, COATED ORAL DAILY
Qty: 90 TABLET | Refills: 3 | Status: SHIPPED | OUTPATIENT
Start: 2020-02-24 | End: 2021-03-11 | Stop reason: SDUPTHER

## 2020-02-24 NOTE — TELEPHONE ENCOUNTER
Last appointment: 2/11/2020  Next appointment:   Future Appointments   Date Time Provider Leila Barbozai   3/3/2020 11:15 AM SCHEDULE, LILLIE REMOTE TRANSMISSION FF [facility]   9/30/2020  3:30 PM SCHEDULE, LILLIE DEVICE CHECK FF [facility]   9/30/2020  3:30 PM Steven Olsen MD FF [facility]   [facility]6/2020  9[facility]30 [facility]   12/7/2020  1:00 PM Arline Newman MD [facility]   2/24/2021  1:15 PM Ivory Chang MD [facility]

## 2020-03-01 PROCEDURE — 93298 REM INTERROG DEV EVAL SCRMS: CPT | Performed by: INTERNAL MEDICINE

## 2020-03-01 PROCEDURE — G2066 INTER DEVC REMOTE 30D: HCPCS | Performed by: INTERNAL MEDICINE

## 2020-03-03 ENCOUNTER — NURSE ONLY (OUTPATIENT)
Dept: CARDIOLOGY CLINIC | Age: 65
End: 2020-03-03
Payer: COMMERCIAL

## 2020-03-03 NOTE — LETTER
[address] [place] [phone]  [address] [address] [address] [facility] [phone]    Pacemaker/Defibrillator Clinic          03/02/20        Dk Alba  [address] [facility] [address]        Dear Feliz Alba    This letter is to inform you that we received the transmission from your monitor at home that checks your implanted heart device. The next date your monitor will automatically transmit will be 4-6-20. If your report needs attention we will notify you. Your device and monitor are wireless and most transmit cellularly, but please periodically check your monitor is still plugged in to the electrical outlet. If you still use the telephone land line to send please ensure the connection to the phone kevin is secure. This will help to ensure successful automatic transmissions in the future. Also, the monitor needs to be close to you while sleeping at night. Please be aware that the remote device transmission sites are periodically monitored only during regular business hours during which simultaneous in-office device clinics are being run. If your transmission requires attention, we will contact you as soon as possible. Thank you.             [facility]

## 2020-04-06 ENCOUNTER — NURSE ONLY (OUTPATIENT)
Dept: CARDIOLOGY CLINIC | Age: 65
End: 2020-04-06
Payer: COMMERCIAL

## 2020-04-06 PROCEDURE — G2066 INTER DEVC REMOTE 30D: HCPCS | Performed by: INTERNAL MEDICINE

## 2020-04-06 PROCEDURE — 93298 REM INTERROG DEV EVAL SCRMS: CPT | Performed by: INTERNAL MEDICINE

## 2020-04-06 NOTE — LETTER
[address] [facility] [phone]  [address] [address] [address] [facility] [place][note 2] [phone]    Pacemaker/Defibrillator Clinic          04/06/20        Dk Alba  [address] [facility] [address]        Dear Daphnie Alba    This letter is to inform you that we received the transmission from your monitor at home that checks your implanted heart device. The next date your monitor will automatically transmit will be 5-11-20. If your report needs attention we will notify you. Your device and monitor are wireless and most transmit cellularly, but please periodically check your monitor is still plugged in to the electrical outlet. If you still use the telephone land line to send please ensure the connection to the phone kevin is secure. This will help to ensure successful automatic transmissions in the future. Also, the monitor needs to be close to you while sleeping at night. Please be aware that the remote device transmission sites are periodically monitored only during regular business hours during which simultaneous in-office device clinics are being run. If your transmission requires attention, we will contact you as soon as possible. Thank you.             Kavitha

## 2020-05-11 ENCOUNTER — NURSE ONLY (OUTPATIENT)
Dept: CARDIOLOGY CLINIC | Age: 65
End: 2020-05-11
Payer: COMMERCIAL

## 2020-05-11 PROCEDURE — 93298 REM INTERROG DEV EVAL SCRMS: CPT | Performed by: INTERNAL MEDICINE

## 2020-05-11 PROCEDURE — G2066 INTER DEVC REMOTE 30D: HCPCS | Performed by: INTERNAL MEDICINE

## 2020-05-11 NOTE — PROGRESS NOTES
[facility] remote Linq report shows AF recordings not to be real. These all show sinus rhythm with ectopy. We will continue to monitor remotely.

## 2020-05-12 RX ORDER — CHLORTHALIDONE 25 MG/1
25 TABLET ORAL DAILY
Qty: 30 TABLET | Refills: 3 | Status: SHIPPED | OUTPATIENT
Start: 2020-05-12 | End: 2020-05-26 | Stop reason: ALTCHOICE

## 2020-05-18 NOTE — PROGRESS NOTES
Spoke with pt. Per pt request sent to the [facility] inside [address] [place] [address].  Fax number [phone]

## 2020-05-21 LAB
BUN / CREAT RATIO: 18 (CALC) (ref 6–22)
BUN BLDV-MCNC: 25 MG/DL (ref 7–25)
CALCIUM SERPL-MCNC: 9.8 MG/DL (ref 8.6–10.3)
CHLORIDE BLD-SCNC: 99 MMOL/L (ref 98–110)
CO2: 32 MMOL/L (ref 20–32)
COPY TO: NORMAL
CREAT SERPL-MCNC: 1.42 MG/DL (ref 0.7–1.25)
GFR AFRICAN AMERICAN: 59 ML/MIN/1.73M2
GFR, ESTIMATED: 51 ML/MIN/1.73M2
GLUCOSE BLD-MCNC: 84 MG/DL (ref 65–139)
POTASSIUM SERPL-SCNC: 3.8 MMOL/L (ref 3.5–5.3)
SODIUM BLD-SCNC: 137 MMOL/L (ref 135–146)

## 2020-05-26 ENCOUNTER — TELEPHONE (OUTPATIENT)
Dept: CARDIOLOGY CLINIC | Age: 65
End: 2020-05-26

## 2020-05-26 RX ORDER — AMLODIPINE BESYLATE 2.5 MG/1
2.5 TABLET ORAL DAILY
Qty: 30 TABLET | Refills: 0 | Status: SHIPPED | OUTPATIENT
Start: 2020-05-26 | End: 2020-06-15 | Stop reason: SDUPTHER

## 2020-06-15 ENCOUNTER — NURSE ONLY (OUTPATIENT)
Dept: CARDIOLOGY CLINIC | Age: 65
End: 2020-06-15
Payer: COMMERCIAL

## 2020-06-15 PROCEDURE — 93298 REM INTERROG DEV EVAL SCRMS: CPT | Performed by: INTERNAL MEDICINE

## 2020-06-15 PROCEDURE — G2066 INTER DEVC REMOTE 30D: HCPCS | Performed by: INTERNAL MEDICINE

## 2020-06-15 RX ORDER — AMLODIPINE BESYLATE 2.5 MG/1
2.5 TABLET ORAL DAILY
Qty: 90 TABLET | Refills: 3 | Status: SHIPPED | OUTPATIENT
Start: 2020-06-15 | End: 2020-07-29 | Stop reason: ALTCHOICE

## 2020-07-28 NOTE — PROGRESS NOTES
[address]   Electrophysiology Follow up   Date: 7/29/2020      Chief Complaint   Patient presents with    Follow-up     8 months    Tachycardia        HPI: Dk Alba is a 77 y.o. male with a PMH of HLD and asthma. He is referred today to discuss an ILR implantation at the request of Dr. Cris Coker. He was seen at [address] [place] [address] with concerns for a TIA . He was found to have a PFO and has had 2 episodes of TIA in the past.    Also reports 2 episodes of what he describes as irregular heart beat with rate from 120-60 for almost an hour 6 and 12 months ago. Both his event monitor and Holters have shown short atrial runs    Interval Hx:  Jessie Saini presents to the office in follow up. He is doing well from a cardiac standpoint. He was symptomatic with his episodes of SVT with questionable atrial fibrillation. He pressed his symptom button on his monitor. Past Medical History:   Diagnosis Date    Basal cell carcinoma of right nasal sidewall 6/4/2015    BPH (benign prostatic hyperplasia) 10/18/2010    Colon, diverticulosis 12/24/2018    Hyperlipidemia 10/18/2010    Nephrolithiasis     s/p lithotripsy x 4, calcium oxalate.  Raynaud's disease 12/4/2014    Wears glasses         Past Surgical History:   Procedure Laterality Date    COLONOSCOPY  2007    q 10,5/2018    CYSTOSCOPY  11/14/13     CYSTOSCOPY TRANSURETHRAL RESECTION PROSTATE    HERNIA REPAIR      KIDNEY STONE SURGERY Left 8/29/13    perc. nephrolithotomy,stint placement    MOHS SURGERY  06/2015    [facility]-Dr. Keren Canseco rt nasal sidewall    OTHER SURGICAL HISTORY      perirectal abscess 1992    PROSTATE SURGERY  4/05    biopsy       Allergies: Allergies   Allergen Reactions    Percocet [Oxycodone-Acetaminophen] Other (See Comments)     constipation       Social History:   reports that he has never smoked. He has never used smokeless tobacco. He reports that he does not drink alcohol or use drugs.      Family History:  family history includes Cancer in his mother; Heart Disease in his father; Hypertension in his father. Reviewed. Denies family history of sudden cardiac death, arrhythmia, premature CAD    Review of System:  All other systems reviewed and are negative except for that noted above. Pertinent negatives are:   General: negative for fever, chills   Ophthalmic ROS: negative for - eye pain or loss of vision  ENT ROS: negative for - headaches, sore throat   Respiratory: negative for - cough, sputum  Cardiovascular: Reviewed in HPI  Gastrointestinal: negative for - abdominal pain, diarrhea, N/V  Hematology: negative for - bleeding, blood clots, bruising or jaundice  Genito-Urinary:  negative for - Dysuria or incontinence  Musculoskeletal: negative for - Joint swelling, muscle pain  Neurological: negative for - confusion, dizziness, headaches   Psychiatric: No anxiety, no depression. Dermatological: negative for - rash    Physical Examination:  Vitals:    07/29/20 1316   BP: 134/79   Pulse: 57   Resp: 18      Wt Readings from Last 3 Encounters:   07/29/20 199 lb (90.3 kg)   02/12/20 197 lb 3.2 oz (89.4 kg)   02/11/20 196 lb (88.9 kg)       · Constitutional: Oriented. No distress. · Head: Normocephalic and atraumatic. · Mouth/Throat: Oropharynx is clear and moist.   · Eyes: Conjunctivae normal. EOM are normal.   · Neck: Neck supple. No rigidity. No JVD present. · Cardiovascular: Normal rate, regular rhythm, S1&S2. · Pulmonary/Chest: Bilateral respiratory sounds. No wheezes, No rhonchi. · Abdominal: Soft. Bowel sounds present. No distension, No tenderness. · Musculoskeletal: No tenderness. No edema    · Lymphadenopathy: Has no cervical adenopathy. · Neurological: Alert and oriented. Cranial nerve appears intact, No Gross deficit   · Skin: Skin is warm and dry. No rash noted. · Psychiatric: Has a normal behavior       Labs, diagnostic and imaging results reviewed. Reviewed.    Lab Results   Component Value Date TSH 2.54 2019    TSH 2.95 2016    CREATININE 1.42 2020    CREATININE 1.1 2019    AST 26 2019    ALT 27 2019       EC20  Sinus rhythm  RBBB    Echo: 19  Summary   There is evidence of right-to-left intracardiac shunting/patent foramen   ovale by agitated saline bubble contrast study. Normal left ventricular size, wall thickness & systolic function. Normal left ventricular inflow profile. There are no significant segmental wall motion abnormalities. The left ventricular ejection fraction is calculated at 75 %. Medication:  Current Outpatient Medications   Medication Sig Dispense Refill    amLODIPine (NORVASC) 2.5 MG tablet Take 1 tablet by mouth daily 90 tablet 3    rosuvastatin (CRESTOR) 5 MG tablet Take 1 tablet by mouth daily 90 tablet 3    losartan (COZAAR) 100 MG tablet Take 1 tablet by mouth daily 90 tablet 2    IPRATROPIUM BROMIDE NA by Nasal route      chlorpheniramine (CHLOR-TRIMETON) 4 MG tablet Take 4 mg by mouth every 6 hours as needed for Allergies      clopidogrel (PLAVIX) 75 MG tablet Take 1 tablet by mouth daily 90 tablet 3    hydrocortisone (WESTCORT) 0.2 % cream Apply topically 2 times daily. 30 g 2    Multiple Vitamins-Minerals (MULTI FOR HIM 50+) TABS Take  by mouth.  Coenzyme Q-10 100 MG CAPS Take 1 capsule by mouth daily.        Current Facility-Administered Medications   Medication Dose Route Frequency Provider Last Rate Last Dose    triamcinolone acetonide (KENALOG-40) injection 40 mg  40 mg Intra-Lesional Once Jose Parra MD        triamcinolone acetonide (KENALOG) injection 10 mg  10 mg Intradermal Once Kary Sorto MD           Assessment and plan:   Paroxysmal Atrial Fibrillation  -His baseline rhythm today is Sinus Bradycardia with RBBB, Rate control is difficult d/t this     The Implantable Loop recorder shows some episodes of Supraventricular tachycardia but also some episodes that are very likeley Atrial fibrillation. Rate is fast during these. His baseline HR is low and therefore options of rate control and even antiarrhythmic drugs are limited. Will try low dose cardizem. -Afib risk factors including age, HTN, obesity, inactivity and ORQUIDEA were discussed with patient. Risk factor modification recommended. All questions were answered. - Treatment options including cardioversion, rate control strategy, antiarrhythmics, anticoagulation and possible ablation were discussed with patient. Risks, benefits and alternative of each treatment options were explained. All questions answered  -[id number] Vasc score and anticoagulation discussed. High risk for stroke and thromboembolism. Anticoagulation is recommended. I discussed anticoagulation to decrease the risk of thromboembolic events including stroke. Benefits and alternatives were discussed with patient. Risk of bleeding was discussed. Patient verbalized understanding.   -We discussed treatment options including antiarrhythmics, rate control with anticoagulation, and ablation.   -We discussed progressive nature of atrial fibrillation. Treatment success decreases when AF becomes persistent and last more than 6 months.   -Antiarrhythmic therapy, side effects, benefits and alternative discussed.    -Atrial fibrillation ablation procedure was discussed. We discussed the need for repeat procedure. On average patients may need more than one ablation procedure.    -Risks associated with ablation include but not limited to allergic reaction to the medications, pain, bleeding, infection, nerve injury, injury to diaphragm(breathing muscle), pulmonary embolus(blood clot in lungs), deep vein blood clot, pneumothorax, hemothorax, acute renal failure, cardiac perforation,  tamponade, need for emergent surgery (open heart), permanent pacemaker, pulmonary vein stenosis, left atrial to esophageal fistula, stroke, myocardial infarction and death.  Difference transcription errors may be present. Mervat Morrissey MD, [facility] [address] [facility]   Office: [phone]    Scribe attestation:  This note was scribed in the presence of Mervat Morrissey MD by Jewell Silva RN    I, Dr. Mervat Morrissey personally performed the services described in this documentation as scribed by RN in my presence, and it is both accurate and complete.

## 2020-07-29 ENCOUNTER — NURSE ONLY (OUTPATIENT)
Dept: CARDIOLOGY CLINIC | Age: 65
End: 2020-07-29
Payer: COMMERCIAL

## 2020-07-29 ENCOUNTER — OFFICE VISIT (OUTPATIENT)
Dept: CARDIOLOGY CLINIC | Age: 65
End: 2020-07-29
Payer: COMMERCIAL

## 2020-07-29 VITALS
RESPIRATION RATE: 18 BRPM | BODY MASS INDEX: 27.86 KG/M2 | SYSTOLIC BLOOD PRESSURE: 134 MMHG | HEIGHT: 71 IN | DIASTOLIC BLOOD PRESSURE: 79 MMHG | HEART RATE: 57 BPM | WEIGHT: 199 LBS

## 2020-07-29 PROCEDURE — 99214 OFFICE O/P EST MOD 30 MIN: CPT | Performed by: INTERNAL MEDICINE

## 2020-07-29 PROCEDURE — 93291 INTERROG DEV EVAL SCRMS IP: CPT | Performed by: INTERNAL MEDICINE

## 2020-07-29 RX ORDER — DILTIAZEM HYDROCHLORIDE 120 MG/1
120 CAPSULE, COATED, EXTENDED RELEASE ORAL DAILY
Qty: 30 CAPSULE | Refills: 3 | Status: SHIPPED | OUTPATIENT
Start: 2020-07-29 | End: 2020-10-13 | Stop reason: SDUPTHER

## 2020-07-30 NOTE — PROGRESS NOTES
Patient comes in for interrogation of their implanted loop recorder. Interrogation shows 3 symptom recordings- 2 appear to be NSR with ectopy -1 on 6/22/2020 appear to be symptomatic AF.  2 Tachy episodes that appear to be short SVT. Last on 5/27/2020, longest 8 seconds. Many AF episodes-most appear to be SR with ectopy. Implanted for TIA. Patient remains on Eliquis and Cardizem. Please see interrogation for more detail. Patient will see Dr. Carilyn Frankel and we will continue to follow the Patient remotely.

## 2020-08-12 NOTE — PROGRESS NOTES
[facility] remote Linq report shows no arrhythmias. All AF recordings not real.  We will continue to monitor remotely. The skin of the chin to knees was clipped, prepped and draped in the usual sterile manner. (If not otherwise specified, skin prep was bilateral.)

## 2020-09-04 RX ORDER — CLOPIDOGREL BISULFATE 75 MG/1
75 TABLET ORAL DAILY
Qty: 90 TABLET | Refills: 3 | Status: SHIPPED | OUTPATIENT
Start: 2020-09-04 | End: 2020-09-05 | Stop reason: SDUPTHER

## 2020-09-04 NOTE — TELEPHONE ENCOUNTER
Requested Prescriptions     Pending Prescriptions Disp Refills    clopidogrel (PLAVIX) 75 MG tablet [Pharmacy Med Name: Clopidogrel Bisulfate 75 MG Tablet] 90 tablet 3     Sig: TAKE 1 TABLET BY MOUTH DAILY          Number: 90    Refills: 3    Last Office Visit: 07/29/2020     Next Office Visit: 10/13/2020

## 2020-09-16 NOTE — PROGRESS NOTES
[facility]   Electrophysiology  Manny Spore, APRN-CNP  Attending EP: Dr. Gabriel Vizcaino    Date: 9/16/2020  I had the privilege of visiting Dk Alba in the office. Chief Complaint: No chief complaint on file. History of Present Illness: History obtained from patient and medical record. Dk Alba is 77 y.o. male with a past medical history of HLD, asthma, PFO, and TIA. S/p ILR implant (10/19)    -Interval history: Today, Dk Alba is being seen for routine follow up. He is doing well. His loop interrogation shows brief episodes of SVT and some episodes of atrial fibrillation, longest 2 hours on September 11th. Pt reports he did not recall any symptoms that day. He states that he sometimes has intermittent palpitations and heart fluttering. The symptoms will resolve rather quickly on their own. He reports overall he feels better since starting the cardizem two months ago. He will consider an ablation in the future if his burden worsens. His blood pressure is stable, 130/64. He reports similar readings at home. Pt wants to start exercising further to help him lose weight. His cholesterol is well controlled on crestor. He is a retired . Denies having chest pain, palpitations, shortness of breath, orthopnea/PND, cough, or dizziness at the time of this visit. With regard to medication therapy the patient has been compliant with prescribed regimen. They have tolerated therapy to date. Allergies: Allergies   Allergen Reactions    Percocet [Oxycodone-Acetaminophen] Other (See Comments)     constipation     Home Medications:  Prior to Visit Medications    Medication Sig Taking?  Authorizing Provider   losartan (COZAAR) 100 MG tablet Take 1 tablet by mouth daily  YVETTE Hawkins CNP   clopidogrel (PLAVIX) 75 MG tablet Take 1 tablet by mouth daily  YVETTE Hawkins - CNP   apixaban (ELIQUIS) 5 MG TABS tablet Take 1 tablet by mouth 2 times daily  Ely Jensen MD dilTIAZem (CARDIZEM CD) 120 MG extended release capsule Take 1 capsule by mouth daily  Ely Jensen MD   rosuvastatin (CRESTOR) 5 MG tablet Take 1 tablet by mouth daily  Blake Moreno MD   IPRATROPIUM BROMIDE NA by Nasal route  Historical Provider, MD   chlorpheniramine (CHLOR-TRIMETON) 4 MG tablet Take 4 mg by mouth every 6 hours as needed for Allergies  Historical Provider, MD   hydrocortisone (WESTCORT) 0.2 % cream Apply topically 2 times daily. Blake Moreno MD   Multiple Vitamins-Minerals [facility]+) TABS Take  by mouth. Historical Provider, MD   Coenzyme Q-10 100 MG CAPS Take 1 capsule by mouth daily. Historical Provider, MD      Past Medical History:  Past Medical History:   Diagnosis Date    Basal cell carcinoma of right nasal sidewall 6/4/2015    BPH (benign prostatic hyperplasia) 10/18/2010    Colon, diverticulosis 12/24/2018    Hyperlipidemia 10/18/2010    Nephrolithiasis     s/p lithotripsy x 4, calcium oxalate.  Raynaud's disease 12/4/2014    Wears glasses      Past Surgical History:    has a past surgical history that includes hernia repair; Prostate surgery (4/05); other surgical history; Colonoscopy (2007); Kidney stone surgery (Left, 8/29/13); Cystocopy (11/14/13); and Mohs surgery (06/2015). Social History:  Reviewed. reports that he has never smoked. He has never used smokeless tobacco. He reports that he does not drink alcohol or use drugs. Family History:  Reviewed. family history includes Cancer in his mother; Heart Disease in his father; Hypertension in his father.      Review of System:  · Constitutional: Negative for fever, night sweats, chills, weight changes, or weakness  · Skin: Negative for rash, dry skin, pruritus, bruising, bleeding, blood clots, or changes in skin pigment  · HEENT: Negative for vision changes, ringing in the ears, sore throat, dysphagia, or swollen lymph nodes  · Respiratory: Reviewed in HPI  · Cardiovascular: Reviewed in HPI  · Gastrointestinal: Negative for abdominal pain, N/V/D, constipation, or black/tarry stools  · Genito-Urinary: Negative for dysuria, incontinence, urgency, or hematuria  · Musculoskeletal: Negative for joint swelling, muscle pain, or injuries  · Neurological/Psych: Negative for confusion, seizures, dizziness, headaches, balance issues or TIA-like symptoms. No anxiety, depression, or insomnia    Physical Examination:  There were no vitals filed for this visit. Wt Readings from Last 3 Encounters:   07/29/20 199 lb (90.3 kg)   02/12/20 197 lb 3.2 oz (89.4 kg)   02/11/20 196 lb (88.9 kg)     Constitutional: Cooperative and in no apparent distress, and appears well nourished  Skin: Warm and pink; no pallor, cyanosis, bruising, or clubbing  HEENT: Symmetric and normocephalic. PERRL, EOM intact. Conjunctiva pink with clear sclera. Mucus membranes pink and moist. Teeth intact. Thyroid smooth without nodules or goiter  Respiratory: Respirations symmetric and unlabored. Lungs clear to auscultation bilaterally, no wheezing, rhonchi, or crackles  Cardiovascular:  Regular rate and rhythm. S1/S2 present without murmurs, rubs, or gallops. Peripheral pulses 2+, capillary refill < 3 seconds. No elevation of JVP. No peripheral edema  Gastrointestinal: Abdomen soft and round. Bowel sounds normoactive in all quadrants without tenderness or masses. Musculoskeletal: Bilateral upper and lower extremity strength 5/5 with full ROM. Neurological/Psych: Awake and orientated to person, place and time. Calm affect, appropriate mood.      Pertinent labs, diagnostic, device, and imaging results reviewed as a part of this visit    LABS    CBC:   Lab Results   Component Value Date    WBC 5.9 11/29/2019    HGB 16.0 11/29/2019    HCT 48.2 11/29/2019    MCV 91.0 11/29/2019     11/29/2019     BMP:   Lab Results   Component Value Date    CREATININE 1.42 (H) 05/20/2020    BUN 25 05/20/2020     05/20/2020    K 3.8 05/20/2020    CL 99 2020    CO2 32 2020     CrCl cannot be calculated (Unknown ideal weight.). Thyroid:   Lab Results   Component Value Date    TSH 2.54 2019     Lipid Panel:   Lab Results   Component Value Date    CHOL 154 2019    HDL 63 2019    HDL 62 2011    TRIG 85 2019     LFTs:  Lab Results   Component Value Date    ALT 27 2019    AST 26 2019    ALKPHOS 92 2019    BILITOT 0.7 2019     Coags:   Lab Results   Component Value Date    PROTIME 25.4 10/18/2019    INR 2.53 10/18/2019    APTT 30.3 2013       EC2020  - NSR with PACs    Echo:    There is evidence of right-to-left intracardiac shunting/patent foramen   ovale by agitated saline bubble contrast study. Normal left ventricular size, wall thickness & systolic function. Normal left ventricular inflow profile. There are no significant segmental wall motion abnormalities. The left ventricular ejection fraction is calculated at 75 %. GXT: None    Assessment:    1. Paroxysmal Atrial Fibrillation  - Currently in NSR with frequent PACs  - Continue cardizem 120 mg QD   ~ Limited in medical therapy due to low resting HR    - [id number] score: 3 (Age, HTN, TIA) ; [id number] Vasc score and anticoagulation discussed. High risk for stroke and thromboembolism. Anticoagulation is recommended. Risk of bleeding was discussed.  ~ On Eliquis 5 mg BID; tolerating well    - Afib risk factors including age, HTN, obesity, inactivity and ORQUIDEA were discussed with patient. Risk factor modification recommended   ~ TSH 2.54 (3/19)      - Treatment options including cardioversion, rate control strategy, antiarrhythmics, anticoagulation and possible ablation were discussed with patient. Risks, benefits and alternative of each treatment options were explained. All questions answered    ~ Pt will consider an ablation if his burden worsens    2.  Implantable Loop Recorder  - S/p ILR insertion on 10/19  -The CIED was interrogated and programmed and I supervised and reviewed all the data. All findings and changes are in device interrogation sheat and reflect my personal interpretation and changes and is scanned to Epic  - Device shows: NSR. Brief episodes of SVT and AF  - Follow up with device clinic as scheduled    3. SVT   - Noted on loop   - Continue CCB    4. HTN  - Controlled: Goal <130/80  - Continue current medications  - Encouraged to monitor and log BP readings at home, then bring log to next visit  - Discussed importance of low sodium diet, weight control and exercise    5. Hx of TIA   - On Eliquis, plavix, rosuvastatin    6. PFO   - On Eliquis   - Followed by Dr. Belem Pitts    7. Hyperlipidemia  - Controlled. Goal: LDL <100   ~ LDL 74 (11/19)  - On rosuvastatin 5 mg QD  - Discussed diet, weight loss, and exercise needs  - Followed per PCP    Plan:  1. Continue current medications  2. Exercise as tolerated  3. Call office if symptoms worsen    F/U: Follow-up with Dr. Manny Litten in 6 months  -Follow up with device clinic as scheduled  -[facility] at [phone] with any questions    Diet & Exercise:   The patient is counseled to follow a low salt diet to assure blood pressure remains controlled for cardiovascular risk factor modification   The patient is counseled to avoid excess caffeine, and energy drinks as this may exacerbated ectopy and arrhythmia   The patient is counseled to lose weight to control cardiovascular risk factors   Exercise program discussed: To improve overall cardiovascular health, the patient is instructed to increase cardiovascular related activities with a goal of 150 min/week of moderate level activity or 10,000 steps per day. Encouraged to perform as much activity as tolerated    Quality Metrics  1. Tobacco Cessation Counseling: N/A  2. Retake of BP if >140/90: N/A  3. Documentation to PCP: Note sent to PCP office visit  4.    CAD patient on anti-platelet: N/A   5.   CAD patient on STATIN therapy: N/A   6. Patient with history of CHF and atrial fibrillation on anticoagulation: Yes (Eliquis)      I have addressed the patient's cardiac risk factors and adjusted pharmacologic treatment as needed. In addition, I have reinforced the need for patient directed risk factor modification. I independently reviewed the device check interrogation and ECG    All questions and concerns were addressed with the patient. Alternatives to treatment were discussed. Thank you for allowing to us to participate in the care of Dk Alba.     Felicia Arenas, YVETTE-CNP  [address]   Office: [phone]

## 2020-10-06 ENCOUNTER — OFFICE VISIT (OUTPATIENT)
Dept: DERMATOLOGY | Age: 65
End: 2020-10-06
Payer: COMMERCIAL

## 2020-10-06 PROCEDURE — 99213 OFFICE O/P EST LOW 20 MIN: CPT | Performed by: DERMATOLOGY

## 2020-10-06 PROCEDURE — 11102 TANGNTL BX SKIN SINGLE LES: CPT | Performed by: DERMATOLOGY

## 2020-10-06 PROCEDURE — 17000 DESTRUCT PREMALG LESION: CPT | Performed by: DERMATOLOGY

## 2020-10-06 PROCEDURE — 17003 DESTRUCT PREMALG LES 2-14: CPT | Performed by: DERMATOLOGY

## 2020-10-06 NOTE — PROGRESS NOTES
[facility] Dermatology  Bhavya Lambert MD  [phone]      Dk Alba  1955    77 y.o. male     Date of Visit: 10/6/2020    Chief Complaint: f/u skin cancer, lesions  Chief Complaint   Patient presents with    1 Year Follow Up     Skin erosion     Last seen:   *had a TIA in the summer 2019 - on plavix now  *went to [facility] - 39 yr reunion cancalled this summer 2020    History of Present Illness:    1. Here for f/u for skin cancer. S/p Mohs 6-2015 for nod BCC on the R nasal sidewall and then scar revision. No probs with this site - scar looks great and no pain, no bleeding, no concerns. 2. Here for a skin check for evaluation of multiple asx moles on the trunk and extremities, present for many years; no change in size/shape/color of any lesions; no bleeding lesions. 3. Hx of AK's -few new concerns on the face and scalp. 4. He has a firm papule on the L popliteal area. Asymptomatic    5. Concerning lesion on the L medial clavicle. Asx. Unsure of duration. Previously discussed flushing with niacin. No personal or family hx of skin cancer. Review of Systems:  Gen: Feels well, good sense of health. Skin: No changing moles or lesions. No new rashes. Past Medical History, Family History, Surgical History, Medications and Allergies reviewed. Past Medical History:   Diagnosis Date    Basal cell carcinoma of right nasal sidewall 6/4/2015    BPH (benign prostatic hyperplasia) 10/18/2010    Colon, diverticulosis 12/24/2018    Hyperlipidemia 10/18/2010    Nephrolithiasis     s/p lithotripsy x 4, calcium oxalate.      Raynaud's disease 12/4/2014    Wears glasses        Past Surgical History:   Procedure Laterality Date    COLONOSCOPY  2007    q 10,5/2018    CYSTOSCOPY  11/14/13     CYSTOSCOPY TRANSURETHRAL RESECTION PROSTATE    HERNIA REPAIR      KIDNEY STONE SURGERY Left 8/29/13    perc. nephrolithotomy,stint placement    MOHS SURGERY  06/2015    LUC- (sooner if indicated), self checks    3. Hx of AK's - frontal scalp and FH  - 2 lesion(s) treated with liquid nitrogen x 2 cycles. Patient educated on risk of blister, hypopigmentation/scar and wound care. - cont sun rpotection    4. L popliteal area - DF  - reassured    5. R/o LK vs BCC - L medial clavicle  - Shave biopsy performed after verbal consent obtained. Patient educated regarding risk of bleeding, infection, scar and educated on wound care. Skin cleansed with alcohol pad and site anesthetized with lido + epi. Aluminum chloride applied to site for hemostasis. Petrolatum ointment and bandage applied. Specimen bottle labeled with patient information and site and specimen sent to [facility]path.      Previously discussed - Small epidermoid cyst - scrotum  R great toe - myxoid cyst - asx

## 2020-10-06 NOTE — PATIENT INSTRUCTIONS
to the wound using a cotton tipped applicator.  Cover with a clean bandage.  Repeat this process until the biopsy site is healed.  If you had stitches placed, continue treating the site until the stitches are removed. Remember to make an appointment to return to have your stitches removed by our staff.  You may shower and bathe as usual.       ** Biopsy results generally take around 7 business days to come back. If you have not heard from us by then, please call the office at [phone] between 8AM and 4PM Monday through Friday.

## 2020-10-09 LAB — DERMATOLOGY PATHOLOGY REPORT: NORMAL

## 2020-10-11 ENCOUNTER — PATIENT MESSAGE (OUTPATIENT)
Dept: DERMATOLOGY | Age: 65
End: 2020-10-11

## 2020-10-13 ENCOUNTER — OFFICE VISIT (OUTPATIENT)
Dept: CARDIOLOGY CLINIC | Age: 65
End: 2020-10-13
Payer: COMMERCIAL

## 2020-10-13 ENCOUNTER — NURSE ONLY (OUTPATIENT)
Dept: CARDIOLOGY CLINIC | Age: 65
End: 2020-10-13
Payer: COMMERCIAL

## 2020-10-13 VITALS
HEART RATE: 60 BPM | DIASTOLIC BLOOD PRESSURE: 64 MMHG | SYSTOLIC BLOOD PRESSURE: 130 MMHG | HEIGHT: 71 IN | OXYGEN SATURATION: 96 % | BODY MASS INDEX: 27.44 KG/M2 | WEIGHT: 196 LBS

## 2020-10-13 PROBLEM — I49.9 IRREGULAR HEART RATE: Status: ACTIVE | Noted: 2020-10-13

## 2020-10-13 PROCEDURE — 93291 INTERROG DEV EVAL SCRMS IP: CPT | Performed by: INTERNAL MEDICINE

## 2020-10-13 PROCEDURE — 99214 OFFICE O/P EST MOD 30 MIN: CPT | Performed by: NURSE PRACTITIONER

## 2020-10-13 RX ORDER — DILTIAZEM HYDROCHLORIDE 120 MG/1
120 CAPSULE, COATED, EXTENDED RELEASE ORAL DAILY
Qty: 90 CAPSULE | Refills: 3 | Status: SHIPPED | OUTPATIENT
Start: 2020-10-13 | End: 2020-11-25

## 2020-10-20 ENCOUNTER — PATIENT MESSAGE (OUTPATIENT)
Dept: CARDIOLOGY CLINIC | Age: 65
End: 2020-10-20

## 2020-10-20 NOTE — TELEPHONE ENCOUNTER
From: Dk Alba  To: Manny Hollis, APRN - CNP  Sent: 10/20/2020 3:27 PM EDT  Subject: Prescription Question    Hi Dimas Eason,     I have had some issues getting the long-term (90-day) prescription you wrote on 10/13 for Eliquis filled. [facility] will fill it (90-day), but it will cost me $62, instead of the $30 I would pay at a local pharmacy with the $10 Eliquis Co-Pay card Dr. Gabriel Vizcaino provided me. But [facility] does not have a contract with [facility] for the 90-day Eliquis; only the 30-day. I've talked to [facility] and [facility], and [facility] will fill the 90-day (they have the [employer] for this), and honor the Co-pay card, so I get it for $30 there. Please send a new 90-day electronic scrip, with 3 refills, to [facility], [facility] [address], [place], [place]: [phone].  Shelly - Dk Alba

## 2020-11-16 ENCOUNTER — NURSE ONLY (OUTPATIENT)
Dept: CARDIOLOGY CLINIC | Age: 65
End: 2020-11-16
Payer: COMMERCIAL

## 2020-11-16 PROCEDURE — G2066 INTER DEVC REMOTE 30D: HCPCS | Performed by: INTERNAL MEDICINE

## 2020-11-16 PROCEDURE — 93298 REM INTERROG DEV EVAL SCRMS: CPT | Performed by: INTERNAL MEDICINE

## 2020-11-16 NOTE — LETTER
[address] [facility] [phone]  [address] [place] [address]  [facility] [address] [place] [address] [phone]    Pacemaker/Defibrillator Clinic          11/16/20        Dk Alba  [address] [facility] [address]        Dear Elizabeth Alba    This letter is to inform you that we received the transmission from your monitor at home that checks your implanted heart device. The next date your monitor will automatically transmit will be 12-21-20. If your report needs attention we will notify you. Your device and monitor are wireless and most transmit cellularly, but please periodically check your monitor is still plugged in to the electrical outlet. If you still use the telephone land line to send please ensure the connection to the phone kevin is secure. This will help to ensure successful automatic transmissions in the future. Also, the monitor needs to be close to you while sleeping at night. Please be aware that the remote device transmission sites are periodically monitored only during regular business hours during which simultaneous in-office device clinics are being run. If your transmission requires attention, we will contact you as soon as possible. Thank you.             [facility]

## 2020-11-16 NOTE — PROGRESS NOTES
We received a remote transmission form patient's monitor at home. Remote Linq report shows known AF. Pt remains on Eliquis. EP physician to review. We will continue to monitor remotely.

## 2020-11-23 NOTE — PROGRESS NOTES
Hypertension in his father. Reviewed. Denies family history of sudden cardiac death, arrhythmia, premature CAD    Review of System:  All other systems reviewed and are negative except for that noted above. Pertinent negatives are:   General: negative for fever, chills   Ophthalmic ROS: negative for - eye pain or loss of vision  ENT ROS: negative for - headaches, sore throat   Respiratory: negative for - cough, sputum  Cardiovascular: Reviewed in HPI  Gastrointestinal: negative for - abdominal pain, diarrhea, N/V  Hematology: negative for - bleeding, blood clots, bruising or jaundice  Genito-Urinary:  negative for - Dysuria or incontinence  Musculoskeletal: negative for - Joint swelling, muscle pain  Neurological: negative for - confusion, dizziness, headaches   Psychiatric: No anxiety, no depression. Dermatological: negative for - rash    Physical Examination:  Vitals:    11/25/20 1450   BP: 122/70   Pulse: 78   SpO2: 97%      Wt Readings from Last 3 Encounters:   11/25/20 200 lb 3.2 oz (90.8 kg)   10/13/20 196 lb (88.9 kg)   07/29/20 199 lb (90.3 kg)       · Constitutional: Oriented. No distress. · Head: Normocephalic and atraumatic. · Mouth/Throat: Oropharynx is clear and moist.   · Eyes: Conjunctivae normal. EOM are normal.   · Neck: Neck supple. No rigidity. No JVD present. · Cardiovascular: Normal rate, regular rhythm, S1&S2. · Pulmonary/Chest: Bilateral respiratory sounds. No wheezes, No rhonchi. · Abdominal: Soft. Bowel sounds present. No distension, No tenderness. · Musculoskeletal: No tenderness. No edema    · Lymphadenopathy: Has no cervical adenopathy. · Neurological: Alert and oriented. Cranial nerve appears intact, No Gross deficit   · Skin: Skin is warm and dry. No rash noted. · Psychiatric: Has a normal behavior       Labs, diagnostic and imaging results reviewed. Reviewed.    Lab Results   Component Value Date    TSH 2.54 03/27/2019    TSH 2.95 03/09/2016    CREATININE 1.42 2020    CREATININE 1.1 2019    AST 26 2019    ALT 27 2019       EC20  SR, PAC's      Echo: 19  Summary   There is evidence of right-to-left intracardiac shunting/patent foramen   ovale by agitated saline bubble contrast study. Normal left ventricular size, wall thickness & systolic function. Normal left ventricular inflow profile. There are no significant segmental wall motion abnormalities. The left ventricular ejection fraction is calculated at 75 %. Medication:  Current Outpatient Medications   Medication Sig Dispense Refill    Coenzyme Q-10 200 MG CAPS       dilTIAZem (CARDIZEM CD) 180 MG extended release capsule Take 1 capsule by mouth daily 90 capsule 3    apixaban (ELIQUIS) 5 MG TABS tablet Take 1 tablet by mouth 2 times daily 180 tablet 3    losartan (COZAAR) 100 MG tablet Take 1 tablet by mouth daily 90 tablet 2    clopidogrel (PLAVIX) 75 MG tablet Take 1 tablet by mouth daily 90 tablet 2    rosuvastatin (CRESTOR) 5 MG tablet Take 1 tablet by mouth daily 90 tablet 3    IPRATROPIUM BROMIDE NA by Nasal route      chlorpheniramine (CHLOR-TRIMETON) 4 MG tablet Take 4 mg by mouth every 6 hours as needed for Allergies      hydrocortisone (WESTCORT) 0.2 % cream Apply topically 2 times daily. 30 g 2    Multiple Vitamins-Minerals (MULTI FOR HIM 50+) TABS Take  by mouth.          Current Facility-Administered Medications   Medication Dose Route Frequency Provider Last Rate Last Dose    triamcinolone acetonide (KENALOG-40) injection 40 mg  40 mg Intra-Lesional Once Yogesh Early MD        triamcinolone acetonide (KENALOG) injection 10 mg  10 mg Intradermal Once Crystal Bray MD           Assessment and plan:   Paroxysmal Atrial Fibrillation, symptomatic  -ECG today shows Sinus with PAC's  - Discussion again of treatment options including increasing diltiazem vs scheduling an ablation  - Increase diltiazem to 180 mg  -Atrial fibrillation ablation procedure was discussed. We discussed the need for repeat procedure. On average patients may need more than one ablation procedure.    -Risks associated with ablation include but not limited to allergic reaction to the medications, pain, bleeding, infection, nerve injury, injury to diaphragm(breathing muscle), pulmonary embolus(blood clot in lungs), deep vein blood clot, pneumothorax, hemothorax, acute renal failure, cardiac perforation,  tamponade, need for emergent surgery (open heart), permanent pacemaker, pulmonary vein stenosis, left atrial to esophageal fistula, stroke, myocardial infarction and death. Difference between atrial fibrillation and atrial flutter discussed and treatment discussed.   -Encouraged risk factor modification including weight loss, discussed BMI and recommend it to be around 25 kg/m2  -Eliquis 5 mg BID    After our discussion he decided to go with increased dose of diltiazem and if he has recurrence of atrial fibrillation to proceed with ablation. HTN  Vitals:    11/25/20 1450   BP: 122/70   Pulse: 78   SpO2: 97%     -Increase Cardizem 180  mg  -Home BP monitoring encourage with a BP goal <130/80    SVT/PAC/PVC  -Not symptomatic  -Seen on a 48hr monitor and event monitor  -S/p ILR 10/22/19  -He had frequent runs of atrial tachycardia and symptomatic. ILR  -The CIED was interrogated and programmed and I supervised and reviewed all the data. All findings and changes are in device interrogation sheat and reflect my personal interpretation and changes and is scanned to [employer].   11/15/2020 interrogation shows Afib burden <0.1%    TIA  -Cryptogenic   -Describes symptoms suggestive of Atrial fibrillation  -S/p ILR 10/22/19    PFO  -Followed by Dr.Rapp ISAACS   -On statin      Plan:  Increase diltiazem to 180 ,mg and 4 month f/u.   If he has recurrence of atrial fibrillation we will proceed with ablation    I independently reviewed device interrogation    Thank you for allowing me to participate in the care of Dk Alba. Further evaluation will be based upon the patient's clinical course and testing results. All questions and concerns were addressed to the patient/family. Alternatives to my treatment were discussed. I have discussed the above stated plan and the patient verbalized understanding and agreed with the plan. NOTE: This report was transcribed using voice recognition software. Every effort was made to ensure accuracy, however, inadvertent computerized transcription errors may be present. Ezequiel Dyson MD, Shelly Mascorro [address] [place] [address]   Office: [phone]    Scribe attestation:  This note was scribed in the presence of Ezequiel Dyson MD by Nikita Wong RN    I, Dr. Ezequiel Dyson personally performed the services described in this documentation as scribed by RN in my presence, and it is both accurate and complete.

## 2020-11-25 ENCOUNTER — OFFICE VISIT (OUTPATIENT)
Dept: CARDIOLOGY CLINIC | Age: 65
End: 2020-11-25
Payer: COMMERCIAL

## 2020-11-25 VITALS
BODY MASS INDEX: 28.03 KG/M2 | WEIGHT: 200.2 LBS | HEIGHT: 71 IN | OXYGEN SATURATION: 97 % | DIASTOLIC BLOOD PRESSURE: 70 MMHG | HEART RATE: 78 BPM | SYSTOLIC BLOOD PRESSURE: 122 MMHG

## 2020-11-25 PROCEDURE — 99214 OFFICE O/P EST MOD 30 MIN: CPT | Performed by: INTERNAL MEDICINE

## 2020-11-25 PROCEDURE — 93000 ELECTROCARDIOGRAM COMPLETE: CPT | Performed by: INTERNAL MEDICINE

## 2020-11-25 RX ORDER — DILTIAZEM HYDROCHLORIDE 180 MG/1
180 CAPSULE, COATED, EXTENDED RELEASE ORAL DAILY
Qty: 90 CAPSULE | Refills: 3 | Status: SHIPPED | OUTPATIENT
Start: 2020-11-25 | End: 2020-11-27 | Stop reason: SDUPTHER

## 2020-11-25 RX ORDER — UBIDECARENONE 200 MG
CAPSULE ORAL
COMMUNITY
Start: 2017-12-01

## 2020-11-25 RX ORDER — DILTIAZEM HYDROCHLORIDE 180 MG/1
180 CAPSULE, COATED, EXTENDED RELEASE ORAL DAILY
Qty: 30 CAPSULE | Refills: 0 | Status: SHIPPED | OUTPATIENT
Start: 2020-11-25 | End: 2020-11-25 | Stop reason: SDUPTHER

## 2020-11-27 ENCOUNTER — TELEPHONE (OUTPATIENT)
Dept: CARDIOLOGY CLINIC | Age: 65
End: 2020-11-27

## 2020-11-27 RX ORDER — DILTIAZEM HYDROCHLORIDE 180 MG/1
180 CAPSULE, COATED, EXTENDED RELEASE ORAL DAILY
Qty: 7 CAPSULE | Refills: 0 | Status: SHIPPED | OUTPATIENT
Start: 2020-11-27 | End: 2020-11-27 | Stop reason: SDUPTHER

## 2020-11-27 RX ORDER — DILTIAZEM HYDROCHLORIDE 180 MG/1
180 CAPSULE, COATED, EXTENDED RELEASE ORAL DAILY
Qty: 90 CAPSULE | Refills: 3 | Status: SHIPPED | OUTPATIENT
Start: 2020-11-27 | End: 2021-11-29 | Stop reason: SDUPTHER

## 2020-11-27 NOTE — TELEPHONE ENCOUNTER
Dk calling to advise that [facility] is stating that on the diltiazem script that was sent to them, the dosage & the refill amt was not listed. Can a new script be sent with this information? Also, he requested that 1 week's worth of the diltiazem be sent to [facility] on [address] [facility] be sent (per his conversation with office last wed) he stated [facility] has not received. Thank you.

## 2020-11-27 NOTE — PROGRESS NOTES
Patient notified that 7 day supply was sent to [facility] and 90 day supply with refills sent to [facility].

## 2020-12-02 ENCOUNTER — PATIENT MESSAGE (OUTPATIENT)
Dept: DERMATOLOGY | Age: 65
End: 2020-12-02

## 2020-12-03 DIAGNOSIS — R73.9 HYPERGLYCEMIA: ICD-10-CM

## 2020-12-03 DIAGNOSIS — Z12.5 PROSTATE CANCER SCREENING: ICD-10-CM

## 2020-12-03 DIAGNOSIS — Z00.00 WELL ADULT EXAM: ICD-10-CM

## 2020-12-03 LAB
A/G RATIO: 3.3 (ref 1.1–2.2)
ALBUMIN SERPL-MCNC: 4.6 G/DL (ref 3.4–5)
ALP BLD-CCNC: 89 U/L (ref 40–129)
ALT SERPL-CCNC: 25 U/L (ref 10–40)
ANION GAP SERPL CALCULATED.3IONS-SCNC: 11 MMOL/L (ref 3–16)
AST SERPL-CCNC: 25 U/L (ref 15–37)
BASOPHILS ABSOLUTE: 0 K/UL (ref 0–0.2)
BASOPHILS RELATIVE PERCENT: 0.5 %
BILIRUB SERPL-MCNC: 0.6 MG/DL (ref 0–1)
BUN BLDV-MCNC: 20 MG/DL (ref 7–20)
CALCIUM SERPL-MCNC: 9.2 MG/DL (ref 8.3–10.6)
CHLORIDE BLD-SCNC: 105 MMOL/L (ref 99–110)
CHOLESTEROL, TOTAL: 162 MG/DL (ref 0–199)
CO2: 26 MMOL/L (ref 21–32)
CREAT SERPL-MCNC: 1 MG/DL (ref 0.8–1.3)
EOSINOPHILS ABSOLUTE: 0.1 K/UL (ref 0–0.6)
EOSINOPHILS RELATIVE PERCENT: 1.7 %
GFR AFRICAN AMERICAN: >60
GFR NON-AFRICAN AMERICAN: >60
GLOBULIN: 1.4 G/DL
GLUCOSE BLD-MCNC: 84 MG/DL (ref 70–99)
HCT VFR BLD CALC: 47 % (ref 40.5–52.5)
HDLC SERPL-MCNC: 55 MG/DL (ref 40–60)
HEMOGLOBIN: 15.7 G/DL (ref 13.5–17.5)
LDL CHOLESTEROL CALCULATED: 85 MG/DL
LYMPHOCYTES ABSOLUTE: 1.5 K/UL (ref 1–5.1)
LYMPHOCYTES RELATIVE PERCENT: 22.1 %
MCH RBC QN AUTO: 29.9 PG (ref 26–34)
MCHC RBC AUTO-ENTMCNC: 33.4 G/DL (ref 31–36)
MCV RBC AUTO: 89.5 FL (ref 80–100)
MONOCYTES ABSOLUTE: 0.8 K/UL (ref 0–1.3)
MONOCYTES RELATIVE PERCENT: 11.9 %
NEUTROPHILS ABSOLUTE: 4.3 K/UL (ref 1.7–7.7)
NEUTROPHILS RELATIVE PERCENT: 63.8 %
PDW BLD-RTO: 13.4 % (ref 12.4–15.4)
PLATELET # BLD: 179 K/UL (ref 135–450)
PMV BLD AUTO: 8.1 FL (ref 5–10.5)
POTASSIUM SERPL-SCNC: 4.6 MMOL/L (ref 3.5–5.1)
PROSTATE SPECIFIC ANTIGEN: 5.41 NG/ML (ref 0–4)
RBC # BLD: 5.25 M/UL (ref 4.2–5.9)
SODIUM BLD-SCNC: 142 MMOL/L (ref 136–145)
TOTAL PROTEIN: 6 G/DL (ref 6.4–8.2)
TRIGL SERPL-MCNC: 112 MG/DL (ref 0–150)
VLDLC SERPL CALC-MCNC: 22 MG/DL
WBC # BLD: 6.7 K/UL (ref 4–11)

## 2020-12-03 NOTE — TELEPHONE ENCOUNTER
From: Arcelia Alba  To: Allen Gutiérrez MD  Sent: 12/2/2020 10:47 PM EST  Subject: Prescription Question    Hi Dr. Erika Addison,     There are two medications, previously administered to me (in approximately April 2015) by you and Dr. Prudence Kaba, during outpatient or office dermatology procedures, that are continuously showing up in every After Visit Summary that I receive, as \"Medications that I Will Be Given. \" Attached to this message is a .pdf ([employer] / [employer]) file that shows this on a recent After Visit Summary. The \"Notes\" tab from that After Visit Summary contains a Medication section that identifies the provider for these medications. Please have this recurring After Visit Summary medication note removed from [address], so that it does not continue to show up in every After Visit Summary I receive.  - Thanks, Dk Alba

## 2020-12-04 LAB
ESTIMATED AVERAGE GLUCOSE: 111.2 MG/DL
HBA1C MFR BLD: 5.5 %

## 2020-12-07 ENCOUNTER — OFFICE VISIT (OUTPATIENT)
Dept: FAMILY MEDICINE CLINIC | Age: 65
End: 2020-12-07
Payer: COMMERCIAL

## 2020-12-07 VITALS
TEMPERATURE: 96.9 F | OXYGEN SATURATION: 98 % | BODY MASS INDEX: 28.64 KG/M2 | WEIGHT: 204.6 LBS | DIASTOLIC BLOOD PRESSURE: 70 MMHG | HEART RATE: 70 BPM | HEIGHT: 71 IN | SYSTOLIC BLOOD PRESSURE: 130 MMHG

## 2020-12-07 PROBLEM — R25.2 MUSCLE CRAMPS AT NIGHT: Status: RESOLVED | Noted: 2017-02-16 | Resolved: 2020-12-07

## 2020-12-07 PROBLEM — I48.0 INTERMITTENT ATRIAL FIBRILLATION (HCC): Status: ACTIVE | Noted: 2020-10-13

## 2020-12-07 PROBLEM — G45.4 TRANSIENT GLOBAL AMNESIA: Status: RESOLVED | Noted: 2019-07-16 | Resolved: 2020-12-07

## 2020-12-07 PROCEDURE — G0438 PPPS, INITIAL VISIT: HCPCS | Performed by: FAMILY MEDICINE

## 2020-12-07 ASSESSMENT — PATIENT HEALTH QUESTIONNAIRE - PHQ9
2. FEELING DOWN, DEPRESSED OR HOPELESS: 0
SUM OF ALL RESPONSES TO PHQ QUESTIONS 1-9: 0
1. LITTLE INTEREST OR PLEASURE IN DOING THINGS: 0
SUM OF ALL RESPONSES TO PHQ QUESTIONS 1-9: 0
SUM OF ALL RESPONSES TO PHQ QUESTIONS 1-9: 0
SUM OF ALL RESPONSES TO PHQ9 QUESTIONS 1 & 2: 0

## 2020-12-07 ASSESSMENT — LIFESTYLE VARIABLES: HOW OFTEN DO YOU HAVE A DRINK CONTAINING ALCOHOL: 0

## 2020-12-07 NOTE — PATIENT INSTRUCTIONS
Personalized Preventive Plan for Dk Alba - 12/7/2020  Medicare offers a range of preventive health benefits. Some of the tests and screenings are paid in full while other may be subject to a deductible, co-insurance, and/or copay. Some of these benefits include a comprehensive review of your medical history including lifestyle, illnesses that may run in your family, and various assessments and screenings as appropriate. After reviewing your medical record and screening and assessments performed today your provider may have ordered immunizations, labs, imaging, and/or referrals for you. A list of these orders (if applicable) as well as your Preventive Care list are included within your After Visit Summary for your review. Other Preventive Recommendations:    · A preventive eye exam performed by an eye specialist is recommended every 1-2 years to screen for glaucoma; cataracts, macular degeneration, and other eye disorders. · A preventive dental visit is recommended every 6 months. · Try to get at least 150 minutes of exercise per week or 10,000 steps per day on a pedometer . · Order or download the FREE \"Exercise & Physical Activity: Your Everyday Guide\" from [employer]. Call 3-355.368.3927 or search [employer] online. · You need 4615-2239 mg of calcium and 3953-9205 IU of vitamin D per day. It is possible to meet your calcium requirement with diet alone, but a vitamin D supplement is usually necessary to meet this goal.  · When exposed to the sun, use a sunscreen that protects against both UVA and UVB radiation with an SPF of 30 or greater. Reapply every 2 to 3 hours or after sweating, drying off with a towel, or swimming. · Always wear a seat belt when traveling in a car. Always wear a helmet when riding a bicycle or motorcycle.

## 2020-12-07 NOTE — PROGRESS NOTES
Medicare Annual Wellness Visit  Name: William Edwards Date: 2020   MRN: [id number] Sex: Male   Age: 77 y.o. Ethnicity: Non-/Non    : 1955 Race: White      Dk Alba is here for Medicare AWV    Screenings for behavioral, psychosocial and functional/safety risks, and cognitive dysfunction are all negative except as indicated below. These results, as well as other patient data from the [address] form, are documented in Flowsheets linked to this Encounter. Allergies   Allergen Reactions    Percocet [Oxycodone-Acetaminophen] Other (See Comments)     constipation       Prior to Visit Medications    Medication Sig Taking? Authorizing Provider   dilTIAZem (CARDIZEM CD) 180 MG extended release capsule Take 1 capsule by mouth daily Yes Ely Jensen MD   Coenzyme Q-10 200 MG CAPS  Yes Historical Provider, MD   apixaban (ELIQUIS) 5 MG TABS tablet Take 1 tablet by mouth 2 times daily Yes YVETTE You - CNP   losartan (COZAAR) 100 MG tablet Take 1 tablet by mouth daily Yes YVETTE Hawkins CNP   rosuvastatin (CRESTOR) 5 MG tablet Take 1 tablet by mouth daily Yes Blake Moreno MD   IPRATROPIUM BROMIDE NA by Nasal route Yes Historical Provider, MD   chlorpheniramine (CHLOR-TRIMETON) 4 MG tablet Take 4 mg by mouth every 6 hours as needed for Allergies Yes Historical Provider, MD   Multiple Vitamins-Minerals (MULTI FOR HIM 50+) TABS Take  by mouth. Yes Historical Provider, MD   clopidogrel (PLAVIX) 75 MG tablet Take 1 tablet by mouth daily  YVETTE Hawkins CNP   hydrocortisone (WESTCORT) 0.2 % cream Apply topically 2 times daily.   Blake Moreno MD       Past Medical History:   Diagnosis Date    Basal cell carcinoma of right nasal sidewall 2015    BPH (benign prostatic hyperplasia) 10/18/2010    Colon, diverticulosis 2018    Hyperlipidemia 10/18/2010    Intermittent atrial fibrillation ([address] 10/13/2020    Nephrolithiasis     s/p normal without polyps  Neck: supple and non-tender without mass, no thyromegaly or thyroid nodules, no cervical lymphadenopathy  Pulmonary/Chest: clear to auscultation bilaterally- no wheezes, rales or rhonchi, normal air movement, no respiratory distress  Cardiovascular: normal rate, regular rhythm, normal S1 and S2, no murmurs, rubs, clicks, or gallops, distal pulses intact, no carotid bruits  Abdomen: soft, non-tender, non-distended, normal bowel sounds, no masses or organomegaly  Extremities: no cyanosis, clubbing or edema  Musculoskeletal: normal range of motion, no joint swelling, deformity or tenderness  Neurologic: reflexes normal and symmetric, no cranial nerve deficit, gait, coordination and speech normal  Orders Only on 12/03/2020   Component Date Value Ref Range Status    PSA 12/03/2020 5.41* 0.00 - 4.00 ng/mL Final    WBC 12/03/2020 6.7  4.0 - 11.0 K/uL Final    RBC 12/03/2020 5.25  4.20 - 5.90 M/uL Final    Hemoglobin 12/03/2020 15.7  13.5 - 17.5 g/dL Final    Hematocrit 12/03/2020 47.0  40.5 - 52.5 % Final    MCV 12/03/2020 89.5  80.0 - 100.0 fL Final    MCH 12/03/2020 29.9  26.0 - 34.0 pg Final    MCHC 12/03/2020 33.4  31.0 - 36.0 g/dL Final    RDW 12/03/2020 13.4  12.4 - 15.4 % Final    Platelets 65/56/5867 179  135 - 450 K/uL Final    MPV 12/03/2020 8.1  5.0 - 10.5 fL Final    Neutrophils % 12/03/2020 63.8  % Final    Lymphocytes % 12/03/2020 22.1  % Final    Monocytes % 12/03/2020 11.9  % Final    Eosinophils % 12/03/2020 1.7  % Final    Basophils % 12/03/2020 0.5  % Final    Neutrophils Absolute 12/03/2020 4.3  1.7 - 7.7 K/uL Final    Lymphocytes Absolute 12/03/2020 1.5  1.0 - 5.1 K/uL Final    Monocytes Absolute 12/03/2020 0.8  0.0 - 1.3 K/uL Final    Eosinophils Absolute 12/03/2020 0.1  0.0 - 0.6 K/uL Final    Basophils Absolute 12/03/2020 0.0  0.0 - 0.2 K/uL Final    Sodium 12/03/2020 142  136 - 145 mmol/L Final    Potassium 12/03/2020 4.6  3.5 - 5.1 mmol/L Final    Chloride 12/03/2020 105  99 - 110 mmol/L Final    CO2 12/03/2020 26  21 - 32 mmol/L Final    Anion Gap 12/03/2020 11  3 - 16 Final    Glucose 12/03/2020 84  70 - 99 mg/dL Final    BUN 12/03/2020 20  7 - 20 mg/dL Final    CREATININE 12/03/2020 1.0  0.8 - 1.3 mg/dL Final    GFR Non- 12/03/2020 >60  >60 Final    GFR  12/03/2020 >60  >60 Final    Calcium 12/03/2020 9.2  8.3 - 10.6 mg/dL Final    Total Protein 12/03/2020 6.0* 6.4 - 8.2 g/dL Final    Alb 12/03/2020 4.6  3.4 - 5.0 g/dL Final    Albumin/Globulin Ratio 12/03/2020 3.3* 1.1 - 2.2 Final    Total Bilirubin 12/03/2020 0.6  0.0 - 1.0 mg/dL Final    Alkaline Phosphatase 12/03/2020 89  40 - 129 U/L Final    ALT 12/03/2020 25  10 - 40 U/L Final    AST 12/03/2020 25  15 - 37 U/L Final    Globulin 12/03/2020 1.4  g/dL Final    Cholesterol, Total 12/03/2020 162  0 - 199 mg/dL Final    Triglycerides 12/03/2020 112  0 - 150 mg/dL Final    HDL 12/03/2020 55  40 - 60 mg/dL Final    LDL Calculated 12/03/2020 85  <100 mg/dL Final    VLDL Cholesterol Calculated 12/03/2020 22  Not Established mg/dL Final    Hemoglobin A1C 12/03/2020 5.5  See comment % Final    eAG 12/03/2020 111.2  mg/dL Final     Patient's complete Health Risk Assessment and screening values have been reviewed and are found in Flowsheets. The following problems were reviewed today and where indicated follow up appointments were made and/or referrals ordered.     Positive Risk Factor Screenings with Interventions:     Health Habits/Nutrition:  Health Habits/Nutrition  Do you exercise for at least 20 minutes 2-3 times per week?: (!) No  Have you lost any weight without trying in the past 3 months?: No  Do you eat fewer than 2 meals per day?: No  Have you seen a dentist within the past year?: Yes  Body mass index: (!) 28.53  Health Habits/Nutrition Interventions:  · Inadequate physical activity:  patient agrees to exercise for at least 150 minutes/week    Personalized Preventive Plan   Current Health Maintenance Status  Immunization History   Administered Date(s) Administered    Influenza 11/12/2015    Influenza Vaccine, unspecified formulation 10/01/2016    Influenza Virus Vaccine 11/17/2014, 11/08/2017, 10/26/2018    Influenza, High Dose (Fluzone 65 yrs and older) 10/23/2019, 09/15/2020    Pneumococcal Conjugate 13-valent ([id number]) 12/24/2018    Pneumococcal Polysaccharide ([id number]) 12/04/2019    Tdap (Boostrix, Adacel) 11/22/2011    Zoster Live (Zostavax) 08/05/2013    Zoster Recombinant (Shingrix) 01/05/2019, 03/09/2019        Health Maintenance   Topic Date Due    DTaP/Tdap/Td vaccine (2 - Td) 11/22/2021    Lipid screen  12/03/2021    Potassium monitoring  12/03/2021    Creatinine monitoring  12/03/2021    PSA counseling  12/03/2021    Colon cancer screen colonoscopy  05/08/2028    Flu vaccine  Completed    Shingles Vaccine  Completed    Pneumococcal 65+ years Vaccine  Completed    Hepatitis C screen  Completed    Hepatitis A vaccine  Aged Out    Hepatitis B vaccine  Aged Out    Hib vaccine  Aged Out    Meningococcal (ACWY) vaccine  Aged Out     Recommendations for [facility] Due: see orders and patient instructions/AVS.  . Recommended screening schedule for the next 5-10 years is provided to the patient in written form: see Patient Instructions/AVS.    Arian Quiroz was seen today for medicare aw.     Diagnoses and all orders for this visit:    Intermittent atrial fibrillation (HCC)    PSA elevation    Well adult exam

## 2020-12-21 ENCOUNTER — NURSE ONLY (OUTPATIENT)
Dept: CARDIOLOGY CLINIC | Age: 65
End: 2020-12-21
Payer: COMMERCIAL

## 2020-12-21 PROCEDURE — G2066 INTER DEVC REMOTE 30D: HCPCS | Performed by: INTERNAL MEDICINE

## 2020-12-21 PROCEDURE — 93298 REM INTERROG DEV EVAL SCRMS: CPT | Performed by: INTERNAL MEDICINE

## 2020-12-21 NOTE — LETTER
[address] [facility] [phone] [facility] [facility]  Artemio Montana [address] [place] [address] [phone]    Pacemaker/Defibrillator Clinic          12/21/20        Dk Alba  [address] [place] [address]        Dear Jennifer Alba    This letter is to inform you that we received the transmission from your monitor at home that checks your implanted heart device. The next date your monitor will automatically transmit will be 1-25-21. If your report needs attention we will notify you. Your device and monitor are wireless and most transmit cellularly, but please periodically check your monitor is still plugged in to the electrical outlet. If you still use the telephone land line to send please ensure the connection to the phone kevin is secure. This will help to ensure successful automatic transmissions in the future. Also, the monitor needs to be close to you while sleeping at night. Please be aware that the remote device transmission sites are periodically monitored only during regular business hours during which simultaneous in-office device clinics are being run. If your transmission requires attention, we will contact you as soon as possible. Thank you.             Daniel 81

## 2021-01-23 PROCEDURE — 93298 REM INTERROG DEV EVAL SCRMS: CPT | Performed by: INTERNAL MEDICINE

## 2021-01-23 PROCEDURE — G2066 INTER DEVC REMOTE 30D: HCPCS | Performed by: INTERNAL MEDICINE

## 2021-01-25 ENCOUNTER — NURSE ONLY (OUTPATIENT)
Dept: CARDIOLOGY CLINIC | Age: 66
End: 2021-01-25
Payer: COMMERCIAL

## 2021-01-25 DIAGNOSIS — I48.0 INTERMITTENT ATRIAL FIBRILLATION (HCC): ICD-10-CM

## 2021-01-25 DIAGNOSIS — Z45.09 ENCOUNTER FOR LOOP RECORDER CHECK: ICD-10-CM

## 2021-01-25 NOTE — LETTER
[address] [facility] [phone] [address] [facility] [address] [place] [address] [phone]    Pacemaker/Defibrillator Clinic          01/25/21        Dk Alba  [address] [facility] [address]        Dear Josephine Alba    This letter is to inform you that we received the transmission from your monitor at home that checks your implanted heart device. The next date your monitor will automatically transmit will be 3-1-21. If your report needs attention we will notify you. Your device and monitor are wireless and most transmit cellularly, but please periodically check your monitor is still plugged in to the electrical outlet. If you still use the telephone land line to send please ensure the connection to the phone kevin is secure. This will help to ensure successful automatic transmissions in the future. Also, the monitor needs to be close to you while sleeping at night. Please be aware that the remote device transmission sites are periodically monitored only during regular business hours during which simultaneous in-office device clinics are being run. If your transmission requires attention, we will contact you as soon as possible. Thank you.             Daniel 81

## 2021-02-24 ENCOUNTER — OFFICE VISIT (OUTPATIENT)
Dept: CARDIOLOGY CLINIC | Age: 66
End: 2021-02-24
Payer: COMMERCIAL

## 2021-02-24 VITALS
WEIGHT: 200.8 LBS | BODY MASS INDEX: 28.11 KG/M2 | DIASTOLIC BLOOD PRESSURE: 78 MMHG | SYSTOLIC BLOOD PRESSURE: 132 MMHG | TEMPERATURE: 97.7 F | HEIGHT: 71 IN | HEART RATE: 68 BPM

## 2021-02-24 DIAGNOSIS — I45.10 RBBB: ICD-10-CM

## 2021-02-24 DIAGNOSIS — I48.0 INTERMITTENT ATRIAL FIBRILLATION (HCC): ICD-10-CM

## 2021-02-24 DIAGNOSIS — I49.3 PVC (PREMATURE VENTRICULAR CONTRACTION): ICD-10-CM

## 2021-02-24 DIAGNOSIS — G45.9 TIA (TRANSIENT ISCHEMIC ATTACK): Primary | ICD-10-CM

## 2021-02-24 DIAGNOSIS — Q21.12 PFO (PATENT FORAMEN OVALE): ICD-10-CM

## 2021-02-24 DIAGNOSIS — I10 BENIGN ESSENTIAL HTN: ICD-10-CM

## 2021-02-24 DIAGNOSIS — E78.2 MIXED HYPERLIPIDEMIA: ICD-10-CM

## 2021-02-24 DIAGNOSIS — I49.1 PAC (PREMATURE ATRIAL CONTRACTION): ICD-10-CM

## 2021-02-24 PROCEDURE — 99214 OFFICE O/P EST MOD 30 MIN: CPT | Performed by: INTERNAL MEDICINE

## 2021-02-24 NOTE — PROGRESS NOTES
CC: PFO, TIA  HPI: In June of 2019, woke up at 6 am and got out of shower. The patient then has no memory of the next events. From what he was [place] wife, was confused, didn't know where he was. Didn't know what time it was. They were concerned about a stroke and went to the ER ([address] [place] [address]) in [place]. Underwent a lot of testing that failed to show a stroke but had an echo that showed normal LVEF and a PFO. He was on aspirin at the time. No headache at the time    Seeing Dr. Iesha Wallis for EP; has ILR. Has had short periods of atrial fibrillation found. He is now on Eliquis. No cp. No sob but does have some CHEEMA with exercise; nothing unusual. No n/v/LH/dizziness. No syncope. No LE edema, orthopnea, or PND. 's to 130's usually. Past Medical History:   Diagnosis Date    Basal cell carcinoma of right nasal sidewall 6/4/2015    BPH (benign prostatic hyperplasia) 10/18/2010    Colon, diverticulosis 12/24/2018    Hyperlipidemia 10/18/2010    Intermittent atrial fibrillation [address] [address] 10/13/2020    Nephrolithiasis     s/p lithotripsy x 4, calcium oxalate.      Raynaud's disease 12/4/2014    Wears glasses      Past Surgical History:   Procedure Laterality Date    COLONOSCOPY  2007    q 10,5/2018    CYSTOSCOPY  11/14/13     CYSTOSCOPY TRANSURETHRAL RESECTION PROSTATE    HERNIA REPAIR      KIDNEY STONE SURGERY Left 8/29/13    perc. nephrolithotomy,stint placement    MOHS SURGERY  06/2015    [facility]-Dr. Donnice Hodgkins rt nasal sidewall    OTHER SURGICAL HISTORY      perirectal abscess 1992    PROSTATE SURGERY  4/05    biopsy     Social History     Tobacco Use    Smoking status: Never Smoker    Smokeless tobacco: Never Used   Substance Use Topics    Alcohol use: No    Drug use: No     Allergies   Allergen Reactions    Percocet [Oxycodone-Acetaminophen] Other (See Comments)     constipation     Family History   Problem Relation Age of Onset    Hypertension Father     Heart Disease Father valvular heart disease    Cancer Mother         lymphoma  80     Review of Systems   General: No fevers, chills, fatigue, or night sweats. No abnormal changes in weight. HEENT: No blurry or decreased vision. No changes in hearing, nasal discharge or sore throat. Cardiovascular: See HPI. No cramping in legs or buttocks when walking. Respiratory: No cough, hemoptysis, or wheezing. No history of asthma. Gastrointestinal: No abdominal pain, hematochezia, melana, or history of GI ulcers. Genito-Urinary: No dysuria or hematuria. No urgency or polyuria. Musculoskeletal: No complaints of joint pain, joint swelling or muscular weakness/soreness. Neurological: No dizziness or headaches. No numbness/tingling, speech problems or weakness. No history of a stroke or TIA. Psychological: No anxiety or depression  Hematological and Lymphatic: No abnormal bleeding or bruising, blood clots, jaundice. Endocrine: No malaise/lethargy, palpitations, polydipsia/polyuria, temperature intolerance or unexpected weight changes. Skin: No rashes or non-healing ulcers. PE:  Blood pressure 132/78, pulse 68, temperature 97.7 °F (36.5 °C), height 5' 11\" (1.803 m), weight 200 lb 12.8 oz (91.1 kg). General (appearance): Well devel. No distress. Eyes: Anicteric. EOMI  Neck: supple. No jvd  Ears/Nose/Mouth/Thorat: No cyanosis  CV: RRR. No m/r/g  Lungs: Clear B, normal respiratory effort  GI: abd s/nt/nd. No peritoneal signs  Skin: Warm, dry. No rashes  Neuro/Psych: Alert and oriented x 3. Appropriate behavior  Ext:  No c/c. No pitting edema  Pulses:  2+ carotid.  No bruits    Lab Results   Component Value Date    WBC 6.7 2020    HGB 15.7 2020    HCT 47.0 2020    MCV 89.5 2020     2020     Lab Results   Component Value Date     2020    K 4.6 2020     2020    CO2 26 2020    BUN 20 2020    CREATININE 1.0 2020    GLUCOSE 84 2020 CALCIUM 9.2 12/03/2020    PROT 6.0 (L) 12/03/2020    LABALBU 4.6 12/03/2020    BILITOT 0.6 12/03/2020    ALKPHOS 89 12/03/2020    AST 25 12/03/2020    ALT 25 12/03/2020    LABGLOM >60 12/03/2020    GFRAA >60 12/03/2020    AGRATIO 3.3 (H) 12/03/2020    GLOB 1.4 12/03/2020     Lab Results   Component Value Date    CHOL 162 12/03/2020    CHOL 154 11/29/2019    CHOL 144 11/07/2018     Lab Results   Component Value Date    TRIG 112 12/03/2020    TRIG 85 11/29/2019    TRIG 62 11/07/2018     Lab Results   Component Value Date    HDL 55 12/03/2020    HDL 63 (H) 11/29/2019    HDL 50 11/07/2018     Lab Results   Component Value Date    LDLCALC 85 12/03/2020    LDLCALC 74 11/29/2019    LDLCALC 82 11/07/2018     Lab Results   Component Value Date    LABVLDL 22 12/03/2020    LABVLDL 17 11/29/2019    LABVLDL 12 11/07/2018     No results found for: CHOLHDLRATIO    7/2019 OSH TTE:  There is evidence of right-to-left intracardiac shunting/patent foramen   ovale by agitated saline bubble contrast study. Normal left ventricular size, wall thickness & systolic function. Normal left ventricular inflow profile. There are no significant segmental wall motion abnormalities. The left ventricular ejection fraction is calculated at 75 %. 6/2019 MRI/MRA Brain:  1. Mild chronic pansinusitis. 2. No acute intracranial process is identified. Unremarkable MR angiogram of the head and neck.     June [facility] Holter report: PAC, PVC, 3 episodes of SVT      Current Outpatient Medications:     dilTIAZem (CARDIZEM CD) 180 MG extended release capsule, Take 1 capsule by mouth daily, Disp: 90 capsule, Rfl: 3    Coenzyme Q-10 200 MG CAPS, , Disp: , Rfl:     apixaban (ELIQUIS) 5 MG TABS tablet, Take 1 tablet by mouth 2 times daily, Disp: 180 tablet, Rfl: 3    losartan (COZAAR) 100 MG tablet, Take 1 tablet by mouth daily, Disp: 90 tablet, Rfl: 2    rosuvastatin (CRESTOR) 5 MG tablet, Take 1 tablet by mouth daily, Disp: 90 tablet, Rfl: 3   IPRATROPIUM BROMIDE NA, by Nasal route, Disp: , Rfl:     chlorpheniramine (CHLOR-TRIMETON) 4 MG tablet, Take 4 mg by mouth every 6 hours as needed for Allergies, Disp: , Rfl:     hydrocortisone (WESTCORT) 0.2 % cream, Apply topically 2 times daily. , Disp: 30 g, Rfl: 2    Multiple Vitamins-Minerals (MULTI FOR HIM 50+) TABS, Take  by mouth.  , Disp: , Rfl:     clopidogrel (PLAVIX) 75 MG tablet, Take 1 tablet by mouth daily, Disp: 90 tablet, Rfl: 2    A/P:  77 y.o. here for TIA. Has PFO as well. Had findings of a fib. 1. TIA (transient ischemic attack)    2. PFO (patent foramen ovale)    3. Intermittent atrial fibrillation (HCC)    4. Mixed hyperlipidemia    5. Benign essential HTN    6. RBBB    7. PAC (premature atrial contraction)    8. PVC (premature ventricular contraction)      Recs:  -Cont diltiazem  -Cont losartan  -Cont eliquis and statin. Has had leg cramps at higher doses w/ crestor in past so will leave alone. -F/U 1 year    Brianna VITALE.  Pablo Khoury MD, [address] [facility]

## 2021-03-01 ENCOUNTER — NURSE ONLY (OUTPATIENT)
Dept: CARDIOLOGY CLINIC | Age: 66
End: 2021-03-01
Payer: COMMERCIAL

## 2021-03-01 DIAGNOSIS — I48.0 INTERMITTENT ATRIAL FIBRILLATION (HCC): ICD-10-CM

## 2021-03-01 DIAGNOSIS — Z45.09 ENCOUNTER FOR LOOP RECORDER CHECK: ICD-10-CM

## 2021-03-01 PROCEDURE — 93298 REM INTERROG DEV EVAL SCRMS: CPT | Performed by: INTERNAL MEDICINE

## 2021-03-01 PROCEDURE — G2066 INTER DEVC REMOTE 30D: HCPCS | Performed by: INTERNAL MEDICINE

## 2021-03-01 NOTE — LETTER
[address] [facility] [phone]  [address] [place] [address] [address] [facility] [phone]    Pacemaker/Defibrillator Clinic          03/01/21        Dk Alba  [address] [facility] [address]        Dear Jamison Alba    This letter is to inform you that we received the transmission from your monitor at home that checks your implanted heart device. The next date your monitor will automatically transmit will be 4-27-21. If your report needs attention we will notify you. Your device and monitor are wireless and most transmit cellularly, but please periodically check your monitor is still plugged in to the electrical outlet. If you still use the telephone land line to send please ensure the connection to the phone kevin is secure. This will help to ensure successful automatic transmissions in the future. Also, the monitor needs to be close to you while sleeping at night. Please be aware that the remote device transmission sites are periodically monitored only during regular business hours during which simultaneous in-office device clinics are being run. If your transmission requires attention, we will contact you as soon as possible. Thank you.             [facility]

## 2021-03-11 RX ORDER — ROSUVASTATIN CALCIUM 5 MG/1
5 TABLET, COATED ORAL DAILY
Qty: 90 TABLET | Refills: 3 | Status: SHIPPED | OUTPATIENT
Start: 2021-03-11 | End: 2021-12-08 | Stop reason: SINTOL

## 2021-03-22 PROBLEM — I49.1 PAC (PREMATURE ATRIAL CONTRACTION): Status: ACTIVE | Noted: 2021-03-22

## 2021-03-22 PROBLEM — I47.1 SVT (SUPRAVENTRICULAR TACHYCARDIA) (HCC): Status: ACTIVE | Noted: 2021-03-22

## 2021-03-22 PROBLEM — Z95.818 STATUS POST PLACEMENT OF IMPLANTABLE LOOP RECORDER: Status: ACTIVE | Noted: 2021-03-22

## 2021-03-22 PROBLEM — I47.10 SVT (SUPRAVENTRICULAR TACHYCARDIA): Status: ACTIVE | Noted: 2021-03-22

## 2021-03-22 PROBLEM — I49.3 PVC (PREMATURE VENTRICULAR CONTRACTION): Status: ACTIVE | Noted: 2021-03-22

## 2021-03-22 PROBLEM — I10 HYPERTENSION: Status: ACTIVE | Noted: 2021-03-22

## 2021-03-22 NOTE — PROGRESS NOTES
[address]   Electrophysiology Follow up   Date: 3/25/2021      Chief Complaint   Patient presents with    Follow-up     4 month        HPI: Dk Alba is a 77 y.o. male with a PMH of HLD and asthma. He is referred today to discuss an ILR implantation at the request of Dr. Rashad Gustafson. He was seen at [address] [place] [address] with concerns for a TIA . He was found to have a PFO and has had 2 episodes of TIA in the past.    Also reports 2 episodes of what he describes as irregular heart beat with rate from 120-60 for almost an hour 6 and 12 months ago. Both his event monitor and Holters have shown short atrial runs    Interval Hx: Walker Best presents today to follow up with his Loop recorder. He rarely feels his atrial fibrillation. Has not felt it in over a year. Past Medical History:   Diagnosis Date    Basal cell carcinoma of right nasal sidewall 6/4/2015    BPH (benign prostatic hyperplasia) 10/18/2010    Colon, diverticulosis 12/24/2018    Hyperlipidemia 10/18/2010    Hypertension 3/22/2021    Intermittent atrial fibrillation ([address] 10/13/2020    Nephrolithiasis     s/p lithotripsy x 4, calcium oxalate.  Raynaud's disease 12/4/2014    Wears glasses         Past Surgical History:   Procedure Laterality Date    COLONOSCOPY  2007    q 10,5/2018    CYSTOSCOPY  11/14/13     CYSTOSCOPY TRANSURETHRAL RESECTION PROSTATE    HERNIA REPAIR      KIDNEY STONE SURGERY Left 8/29/13    perc. nephrolithotomy,stint placement    MOHS SURGERY  06/2015    [facility]-Dr. Andressa Roberts rt nasal sidewall    OTHER SURGICAL HISTORY      perirectal abscess 1992    PROSTATE SURGERY  4/05    biopsy       Allergies: Allergies   Allergen Reactions    Percocet [Oxycodone-Acetaminophen] Other (See Comments)     constipation       Social History:   reports that he has never smoked. He has never used smokeless tobacco. He reports that he does not drink alcohol or use drugs.      Family History:  family history includes Cancer in his mother; Heart Disease in his father; Hypertension in his father. Reviewed. Denies family history of sudden cardiac death, arrhythmia, premature CAD    Review of System:  All other systems reviewed and are negative except for that noted above. Pertinent negatives are:   General: negative for fever, chills   Ophthalmic ROS: negative for - eye pain or loss of vision  ENT ROS: negative for - headaches, sore throat   Respiratory: negative for - cough, sputum  Cardiovascular: Reviewed in HPI  Gastrointestinal: negative for - abdominal pain, diarrhea, N/V  Hematology: negative for - bleeding, blood clots, bruising or jaundice  Genito-Urinary:  negative for - Dysuria or incontinence  Musculoskeletal: negative for - Joint swelling, muscle pain  Neurological: negative for - confusion, dizziness, headaches   Psychiatric: No anxiety, no depression. Dermatological: negative for - rash    Physical Examination:  Vitals:    03/25/21 1355   BP: 132/77   Pulse: 95   SpO2: 96%      Wt Readings from Last 3 Encounters:   03/25/21 200 lb 12.8 oz (91.1 kg)   02/24/21 200 lb 12.8 oz (91.1 kg)   12/07/20 204 lb 9.6 oz (92.8 kg)       · Constitutional: Oriented. No distress. · Head: Normocephalic and atraumatic. · Mouth/Throat: Oropharynx is clear and moist.   · Eyes: Conjunctivae normal. EOM are normal.   · Neck: Neck supple. No rigidity. No JVD present. · Cardiovascular: Normal rate, regular rhythm, S1&S2. · Pulmonary/Chest: Bilateral respiratory sounds. No wheezes, No rhonchi. · Abdominal: Soft. Bowel sounds present. No distension, No tenderness. · Musculoskeletal: No tenderness. No edema    · Lymphadenopathy: Has no cervical adenopathy. · Neurological: Alert and oriented. Cranial nerve appears intact, No Gross deficit   · Skin: Skin is warm and dry. No rash noted. · Psychiatric: Has a normal behavior       Labs, diagnostic and imaging results reviewed. Reviewed.    Lab Results   Component Value Date    TSH 2.54 03/27/2019    TSH 2.95 03/09/2016    CREATININE 1.0 12/03/2020    CREATININE 1.42 05/20/2020    AST 25 12/03/2020    ALT 25 12/03/2020       ECG:  Today Bradycardia with PAC        Echo: 7/1/19  Summary   There is evidence of right-to-left intracardiac shunting/patent foramen   ovale by agitated saline bubble contrast study. Normal left ventricular size, wall thickness & systolic function. Normal left ventricular inflow profile. There are no significant segmental wall motion abnormalities. The left ventricular ejection fraction is calculated at 75 %. Medication:  Current Outpatient Medications   Medication Sig Dispense Refill    rosuvastatin (CRESTOR) 5 MG tablet Take 1 tablet by mouth daily 90 tablet 3    dilTIAZem (CARDIZEM CD) 180 MG extended release capsule Take 1 capsule by mouth daily 90 capsule 3    Coenzyme Q-10 200 MG CAPS       apixaban (ELIQUIS) 5 MG TABS tablet Take 1 tablet by mouth 2 times daily 180 tablet 3    losartan (COZAAR) 100 MG tablet Take 1 tablet by mouth daily 90 tablet 2    IPRATROPIUM BROMIDE NA by Nasal route      chlorpheniramine (CHLOR-TRIMETON) 4 MG tablet Take 4 mg by mouth every 6 hours as needed for Allergies      hydrocortisone (WESTCORT) 0.2 % cream Apply topically 2 times daily. 30 g 2    Multiple Vitamins-Minerals (MULTI FOR HIM 50+) TABS Take  by mouth. Current Facility-Administered Medications   Medication Dose Route Frequency Provider Last Rate Last Admin    triamcinolone acetonide (KENALOG-40) injection 40 mg  40 mg Intra-Lesional Once Reshma Roldan MD        triamcinolone acetonide (KENALOG) injection 10 mg  10 mg Intradermal Once Anette Ashley MD           Assessment and plan:   Paroxysmal Atrial Fibrillation, symptomatic  ECG today Bradycardia with PAC  AF burden  today  0.1 percent  He has not felt his Atrial fibrillation in over a year.   Discussion again of treatment options including increasing diltiazem vs scheduling an ablation  diltiazem 180 mg- increased at last visit. Eliquis 5 mg BID- Creatinine 12/03/2020 1.0  He is having trouble remembering his evening dose. May switch to xarelto if the patient desires. We can continue to monitor or ablate which ever the patient prefers. The last episode of MICHELLE randle was in mid February and lasted about 4 hours. He has had episodes of what appears to be either atrial tachycardia or atrial flutter and some atrial fibrillation in November. Some of the episodes recorded as atrial fibrillation are sinus rhythm with PACs. Overall he does have atrial fibrillation and flutter. The burden is not high. And at this point we can monitor him and decide about further management if it becomes more frequent or symptomatic or prolonged. HTN  -Border line  -BP goal <130/80  -Home BP monitoring encouraged, printed information provided on how to accurately measure BP at home.  -Counseled to follow a low salt diet to assure blood pressure remains controlled for cardiovascular risk factor modification.   -The patient is counseled to get regular exercise 3-5 times per week and maintain a healthy weight reduce cardiovascular risk factors. Cardizem 180 mg daily  Losartan 100 mg daily      SVT/PAC/PVC  -Not symptomatic  -Seen on a 48hr monitor 06/19 and loop recorder        ILR 10/22/2019  -The CIED was interrogated and programmed and I supervised and reviewed all the data. All findings and changes are in device interrogation sheat and reflect my personal interpretation and changes and is scanned to Epic. AF burden 0.1 % . Has decreased recently. 4-5 episodes since November. One episode was 3 hours and 48 min in February.     TIA 06/30/2019  -Cryptogenic   -Describes symptoms suggestive of Atrial fibrillation  -S/p ILR 10/22/19    Patent Foramen Ovale  -Followed by Dr.Rapp ISAACS Crestor 5 mg      Plan:  Follow up in 8 months with EPNP  SR  May switch xarelto for convenience if he desires  Work on weight loss      I independently reviewed device interrogation    Thank you for allowing me to participate in the care of Dk Alba. Further evaluation will be based upon the patient's clinical course and testing results. All questions and concerns were addressed to the patient/family. Alternatives to my treatment were discussed. I have discussed the above stated plan and the patient verbalized understanding and agreed with the plan. NOTE: This report was transcribed using voice recognition software. Every effort was made to ensure accuracy, however, inadvertent computerized transcription errors may be present. Mariela Hutchinson MD, [facility] [address] [place] [address]   Office: [phone]    Scribe attestation:  This note was scribed in the presence of Mariela Hutchinson MD by Vinay Madsen RN    I, Dr. Mariela Hutchinson personally performed the services described in this documentation as scribed by RN in my presence, and it is both accurate and complete.

## 2021-03-25 ENCOUNTER — OFFICE VISIT (OUTPATIENT)
Dept: CARDIOLOGY CLINIC | Age: 66
End: 2021-03-25
Payer: COMMERCIAL

## 2021-03-25 ENCOUNTER — NURSE ONLY (OUTPATIENT)
Dept: CARDIOLOGY CLINIC | Age: 66
End: 2021-03-25
Payer: COMMERCIAL

## 2021-03-25 VITALS
HEART RATE: 95 BPM | HEIGHT: 71 IN | OXYGEN SATURATION: 96 % | WEIGHT: 200.8 LBS | DIASTOLIC BLOOD PRESSURE: 77 MMHG | SYSTOLIC BLOOD PRESSURE: 132 MMHG | BODY MASS INDEX: 28.11 KG/M2

## 2021-03-25 DIAGNOSIS — I49.3 PVC (PREMATURE VENTRICULAR CONTRACTION): ICD-10-CM

## 2021-03-25 DIAGNOSIS — I49.1 PAC (PREMATURE ATRIAL CONTRACTION): ICD-10-CM

## 2021-03-25 DIAGNOSIS — I48.91 ATRIAL FIBRILLATION, UNSPECIFIED TYPE (HCC): ICD-10-CM

## 2021-03-25 DIAGNOSIS — I48.0 INTERMITTENT ATRIAL FIBRILLATION (HCC): Primary | ICD-10-CM

## 2021-03-25 DIAGNOSIS — I10 HYPERTENSION, UNSPECIFIED TYPE: ICD-10-CM

## 2021-03-25 DIAGNOSIS — Z95.818 STATUS POST PLACEMENT OF IMPLANTABLE LOOP RECORDER: ICD-10-CM

## 2021-03-25 DIAGNOSIS — Q21.12 PFO (PATENT FORAMEN OVALE): ICD-10-CM

## 2021-03-25 DIAGNOSIS — I47.1 SVT (SUPRAVENTRICULAR TACHYCARDIA) (HCC): ICD-10-CM

## 2021-03-25 DIAGNOSIS — E78.5 HYPERLIPIDEMIA, UNSPECIFIED HYPERLIPIDEMIA TYPE: ICD-10-CM

## 2021-03-25 DIAGNOSIS — Z45.09 ENCOUNTER FOR LOOP RECORDER CHECK: ICD-10-CM

## 2021-03-25 PROCEDURE — 93291 INTERROG DEV EVAL SCRMS IP: CPT | Performed by: INTERNAL MEDICINE

## 2021-03-25 PROCEDURE — 99214 OFFICE O/P EST MOD 30 MIN: CPT | Performed by: INTERNAL MEDICINE

## 2021-04-27 ENCOUNTER — NURSE ONLY (OUTPATIENT)
Dept: CARDIOLOGY CLINIC | Age: 66
End: 2021-04-27
Payer: COMMERCIAL

## 2021-04-27 DIAGNOSIS — Z45.09 ENCOUNTER FOR LOOP RECORDER CHECK: ICD-10-CM

## 2021-04-27 DIAGNOSIS — I49.1 PAC (PREMATURE ATRIAL CONTRACTION): ICD-10-CM

## 2021-05-05 ENCOUNTER — TELEPHONE (OUTPATIENT)
Dept: DERMATOLOGY | Age: 66
End: 2021-05-05

## 2021-05-07 PROCEDURE — G2066 INTER DEVC REMOTE 30D: HCPCS | Performed by: INTERNAL MEDICINE

## 2021-05-07 PROCEDURE — 93298 REM INTERROG DEV EVAL SCRMS: CPT | Performed by: INTERNAL MEDICINE

## 2021-06-01 ENCOUNTER — NURSE ONLY (OUTPATIENT)
Dept: CARDIOLOGY CLINIC | Age: 66
End: 2021-06-01

## 2021-06-01 DIAGNOSIS — I49.1 PAC (PREMATURE ATRIAL CONTRACTION): ICD-10-CM

## 2021-06-01 DIAGNOSIS — Z45.09 ENCOUNTER FOR LOOP RECORDER CHECK: ICD-10-CM

## 2021-06-01 NOTE — LETTER
[address] [facility] [phone]  [facility]  Jhonatan Schmitt [facility] [phone]    Pacemaker/Defibrillator Clinic    06/01/21      Dk Alba  [address] [facility] [address]      Dear Edy Alba    This letter is to inform you that we received the transmission from your monitor at home that checks your implanted heart device. The next date your monitor will automatically transmit will be 7-6-21. If your report needs attention we will notify you. Your device and monitor are wireless and most transmit cellularly, but please periodically check your monitor is still plugged in to the electrical outlet. If you still use the telephone land line to send please ensure the connection to the phone kevin is secure. This will help to ensure successful automatic transmissions in the future. Also, the monitor needs to be close to you while sleeping at night. Please be aware that the remote device transmission sites are periodically monitored only during regular business hours during which simultaneous in-office device clinics are being run. If your transmission requires attention, we will contact you as soon as possible. **PLEASE NOTE** that our [facility] policy and processes are changing to ensure a more seamless approach for all parties involved, allowing more time for our nurses to address patient issues and concerns. We will no longer be sending letters for NORMAL remote transmissions. You will be contacted by phone if your transmission requires attention (as previously done), and letters will only be sent regarding monitor disconnections or missed transmissions if you are unable to be reached by phone. Please do not be alarmed by this new process, as we will continue to contact you if your transmission report requires attention. This will be your final \"remote received\" letter.   From this point forward, the [facility] will be

## 2021-06-16 ENCOUNTER — TELEPHONE (OUTPATIENT)
Dept: CARDIOLOGY CLINIC | Age: 66
End: 2021-06-16

## 2021-06-16 NOTE — TELEPHONE ENCOUNTER
He sent a phone transmission on 4/27/21 . His results in [address] [place] [address] for the phone transmission are dated 4/24/21 , but his EOB from his insurance company says the date of service for his remote transmission was 5/7/21. Patient is asking why the dates are different for the same thing?

## 2021-06-21 NOTE — TELEPHONE ENCOUNTER
The report he is seeing is a summary report that monitors his device from 3/1/21 - 4/24/21. This was transmitted to us on 4/27/21 and the charges were dropped on 5/7/21.

## 2021-06-21 NOTE — TELEPHONE ENCOUNTER
Can you call the patient about that ? He was a little annoyed and I dont know enough about it that I wont sound stupid.

## 2021-07-06 ENCOUNTER — NURSE ONLY (OUTPATIENT)
Dept: CARDIOLOGY CLINIC | Age: 66
End: 2021-07-06
Payer: COMMERCIAL

## 2021-07-06 DIAGNOSIS — Z45.09 ENCOUNTER FOR LOOP RECORDER CHECK: ICD-10-CM

## 2021-07-06 DIAGNOSIS — I47.1 SVT (SUPRAVENTRICULAR TACHYCARDIA) (HCC): ICD-10-CM

## 2021-07-06 NOTE — PROGRESS NOTES
We received a remote transmission from patient's monitor at home. Remote Linq report shows AF and SVT. Pt remains on Eliquis. EP physician to review. We will continue to monitor remotely.

## 2021-07-11 PROCEDURE — G2066 INTER DEVC REMOTE 30D: HCPCS | Performed by: INTERNAL MEDICINE

## 2021-07-11 PROCEDURE — 93298 REM INTERROG DEV EVAL SCRMS: CPT | Performed by: INTERNAL MEDICINE

## 2021-07-21 RX ORDER — LOSARTAN POTASSIUM 100 MG/1
100 TABLET ORAL DAILY
Qty: 90 TABLET | Refills: 2 | Status: SHIPPED | OUTPATIENT
Start: 2021-07-21 | End: 2022-01-03 | Stop reason: SDUPTHER

## 2021-07-21 NOTE — TELEPHONE ENCOUNTER
Requested Prescriptions     Pending Prescriptions Disp Refills    losartan (COZAAR) 100 MG tablet 90 tablet 2     Sig: Take 1 tablet by mouth daily          Number: 90    Refills: 3    Last Office Visit: 2/24/2021     Next Office Visit: 11.10.2021    Last Labs: 12.3.2020

## 2021-08-07 PROCEDURE — 93298 REM INTERROG DEV EVAL SCRMS: CPT | Performed by: INTERNAL MEDICINE

## 2021-08-07 PROCEDURE — G2066 INTER DEVC REMOTE 30D: HCPCS | Performed by: INTERNAL MEDICINE

## 2021-08-10 ENCOUNTER — NURSE ONLY (OUTPATIENT)
Dept: CARDIOLOGY CLINIC | Age: 66
End: 2021-08-10

## 2021-08-10 DIAGNOSIS — I48.0 INTERMITTENT ATRIAL FIBRILLATION (HCC): ICD-10-CM

## 2021-08-10 DIAGNOSIS — Z45.09 ENCOUNTER FOR LOOP RECORDER CHECK: ICD-10-CM

## 2021-08-10 NOTE — PROGRESS NOTES
We received a remote transmission from patient's monitor at home. Remote Linq report shows AF. Pt remains on Eliquis. EP physician to review. We will continue to monitor remotely.

## 2021-08-13 ENCOUNTER — OFFICE VISIT (OUTPATIENT)
Dept: FAMILY MEDICINE CLINIC | Age: 66
End: 2021-08-13
Payer: COMMERCIAL

## 2021-08-13 VITALS
SYSTOLIC BLOOD PRESSURE: 120 MMHG | DIASTOLIC BLOOD PRESSURE: 70 MMHG | HEART RATE: 71 BPM | HEIGHT: 71 IN | WEIGHT: 198 LBS | OXYGEN SATURATION: 98 % | BODY MASS INDEX: 27.72 KG/M2

## 2021-08-13 DIAGNOSIS — D17.22 LIPOMA OF LEFT UPPER EXTREMITY: ICD-10-CM

## 2021-08-13 DIAGNOSIS — I48.0 INTERMITTENT ATRIAL FIBRILLATION (HCC): ICD-10-CM

## 2021-08-13 PROCEDURE — 99212 OFFICE O/P EST SF 10 MIN: CPT | Performed by: FAMILY MEDICINE

## 2021-08-13 SDOH — ECONOMIC STABILITY: FOOD INSECURITY: WITHIN THE PAST 12 MONTHS, YOU WORRIED THAT YOUR FOOD WOULD RUN OUT BEFORE YOU GOT MONEY TO BUY MORE.: NEVER TRUE

## 2021-08-13 SDOH — ECONOMIC STABILITY: FOOD INSECURITY: WITHIN THE PAST 12 MONTHS, THE FOOD YOU BOUGHT JUST DIDN'T LAST AND YOU DIDN'T HAVE MONEY TO GET MORE.: NEVER TRUE

## 2021-08-13 ASSESSMENT — ENCOUNTER SYMPTOMS
SINUS PRESSURE: 0
PHOTOPHOBIA: 0
BLOOD IN STOOL: 0
STRIDOR: 0
COUGH: 0
EYE PAIN: 0
FACIAL SWELLING: 0
SHORTNESS OF BREATH: 0
CHEST TIGHTNESS: 0
RHINORRHEA: 0
EYE DISCHARGE: 0
ABDOMINAL PAIN: 0
COLOR CHANGE: 0
BACK PAIN: 0
ABDOMINAL DISTENTION: 0
EYE ITCHING: 0
CHOKING: 0
WHEEZING: 0
EYE REDNESS: 0
APNEA: 0
ANAL BLEEDING: 0

## 2021-08-13 ASSESSMENT — PATIENT HEALTH QUESTIONNAIRE - PHQ9
SUM OF ALL RESPONSES TO PHQ QUESTIONS 1-9: 0
1. LITTLE INTEREST OR PLEASURE IN DOING THINGS: 0
SUM OF ALL RESPONSES TO PHQ9 QUESTIONS 1 & 2: 0
SUM OF ALL RESPONSES TO PHQ QUESTIONS 1-9: 0
SUM OF ALL RESPONSES TO PHQ QUESTIONS 1-9: 0
2. FEELING DOWN, DEPRESSED OR HOPELESS: 0

## 2021-08-13 ASSESSMENT — SOCIAL DETERMINANTS OF HEALTH (SDOH): HOW HARD IS IT FOR YOU TO PAY FOR THE VERY BASICS LIKE FOOD, HOUSING, MEDICAL CARE, AND HEATING?: NOT HARD AT ALL

## 2021-08-13 NOTE — PROGRESS NOTES
SURGERY Left 8/29/13    perc. nephrolithotomy,stint placement    MOHS SURGERY  06/2015    [facility]-Dr. Helga Salmeron rt nasal sidewall    OTHER SURGICAL HISTORY      perirectal abscess 1992    PROSTATE SURGERY  4/05    biopsy       Social History     Socioeconomic History    Marital status:      Spouse name: Not on file    Number of children: 4    Years of education: Not on file    Highest education level: Not on file   Occupational History    Occupation: [employer]    Tobacco Use    Smoking status: Never Smoker    Smokeless tobacco: Never Used   Vaping Use    Vaping Use: Never assessed   Substance and Sexual Activity    Alcohol use: No    Drug use: No    Sexual activity: Yes     Partners: Female   Other Topics Concern    Not on file   Social History Narrative    Retired--involved in community    Exercise no     happily     All kids out of town; scattered-4 grandchildren    + seatbelts    Hobbies-astronomy--?  and Voodoo     Social Determinants of Health     Financial Resource Strain: Low Risk     Difficulty of Paying Living Expenses: Not hard at all   Food Insecurity: No Food Insecurity    Worried About Running Out of Food in the Last Year: Never true    Tavo of Food in the Last Year: Never true   Transportation Needs:     Lack of Transportation (Medical):      Lack of Transportation (Non-Medical):    Physical Activity:     Days of Exercise per Week:     Minutes of Exercise per Session:    Stress:     Feeling of Stress :    Social Connections:     Frequency of Communication with Friends and Family:     Frequency of Social Gatherings with Friends and Family:     Attends Christianity Services:     Active Member of Clubs or Organizations:     Attends Club or Organization Meetings:     Marital Status:    Intimate Partner Violence:     Fear of Current or Ex-Partner:     Emotionally Abused:     Physically Abused:     Sexually Abused:        Family History   Problem Relation Age of Onset    Hypertension Father     Heart Disease Father         valvular heart disease    Cancer Mother         lymphoma  80       Immunization History   Administered Date(s) Administered    COVID-19, Pfizer, PF, 30mcg/0.3mL 03/10/2021, 2021    Influenza 2015    Influenza Vaccine, unspecified formulation 10/01/2016    Influenza Virus Vaccine 2014, 2017, 10/26/2018    Influenza, High Dose (Fluzone 65 yrs and older) 10/23/2019, 09/15/2020    Pneumococcal Conjugate 13-valent ([id number]) 2018    Pneumococcal Polysaccharide ([id number]) 2019    Tdap (Boostrix, Adacel) 2011    Zoster Live (Zostavax) 2013    Zoster Recombinant (Shingrix) 2019, 2019       Review of Systems  Review of Systems   Constitutional: Negative for activity change, appetite change, chills, diaphoresis, fatigue, fever and unexpected weight change. HENT: Negative for congestion, dental problem, drooling, ear discharge, ear pain, facial swelling, hearing loss, nosebleeds, postnasal drip, rhinorrhea, sinus pressure, sneezing and tinnitus. Eyes: Negative for photophobia, pain, discharge, redness, itching and visual disturbance. Respiratory: Negative for apnea, cough, choking, chest tightness, shortness of breath, wheezing and stridor. Cardiovascular: Negative for chest pain, palpitations and leg swelling. Gastrointestinal: Negative for abdominal distention, abdominal pain, anal bleeding and blood in stool. Genitourinary: Negative for decreased urine volume, difficulty urinating, discharge, dysuria, enuresis, flank pain, frequency, genital sores, hematuria, penile pain, penile swelling, scrotal swelling, testicular pain and urgency. Musculoskeletal: Negative for arthralgias, back pain, gait problem, joint swelling, myalgias, neck pain and neck stiffness. Skin: Negative for color change, pallor and rash.    Neurological: Negative for dizziness, tremors, seizures, syncope, facial asymmetry, speech difficulty, weakness, light-headedness, numbness and headaches. Hematological: Negative for adenopathy. Does not bruise/bleed easily. Psychiatric/Behavioral: Negative for agitation, behavioral problems, confusion, decreased concentration, dysphoric mood, hallucinations, self-injury and sleep disturbance. The patient is not nervous/anxious and is not hyperactive. Objective:   Physical Exam  Physical Exam  Vitals reviewed. Constitutional:       General: He is not in acute distress. Appearance: He is well-developed. Eyes:      Conjunctiva/sclera: Conjunctivae normal.      Pupils: Pupils are equal, round, and reactive to light. Neck:      Thyroid: No thyromegaly. Vascular: No JVD. Trachea: No tracheal deviation. Cardiovascular:      Rate and Rhythm: Normal rate and regular rhythm. Heart sounds: Normal heart sounds. No murmur heard. No gallop. Pulmonary:      Effort: Pulmonary effort is normal. No respiratory distress. Breath sounds: Normal breath sounds. No stridor. No wheezing or rales. Chest:      Chest wall: No tenderness. Abdominal:      General: Bowel sounds are normal. There is no distension. Palpations: Abdomen is soft. There is no mass. Tenderness: There is no abdominal tenderness. Musculoskeletal:         General: No tenderness. Lymphadenopathy:      Cervical: No cervical adenopathy. Skin:     General: Skin is warm and dry. Coloration: Skin is not pale. Findings: No erythema or rash. Comments: 3 cm soft tissue over L shoulder   Neurological:      Mental Status: He is alert and oriented to person, place, and time. Cranial Nerves: No cranial nerve deficit. Motor: No abnormal muscle tone. Coordination: Coordination normal.      Deep Tendon Reflexes: Reflexes normal.   Psychiatric:         Behavior: Behavior normal.         Thought Content:  Thought content normal. Judgment: Judgment normal.         Assessment and Plan:     Lipoma of left upper extremity   watch size,consistency    Intermittent atrial fibrillation (HCC)   Well-controlled, continue current medications

## 2021-08-18 PROCEDURE — 93298 REM INTERROG DEV EVAL SCRMS: CPT | Performed by: INTERNAL MEDICINE

## 2021-08-18 PROCEDURE — G2066 INTER DEVC REMOTE 30D: HCPCS | Performed by: INTERNAL MEDICINE

## 2021-09-14 ENCOUNTER — NURSE ONLY (OUTPATIENT)
Dept: CARDIOLOGY CLINIC | Age: 66
End: 2021-09-14

## 2021-09-14 DIAGNOSIS — Z45.09 ENCOUNTER FOR LOOP RECORDER CHECK: ICD-10-CM

## 2021-09-14 DIAGNOSIS — I48.0 INTERMITTENT ATRIAL FIBRILLATION (HCC): ICD-10-CM

## 2021-09-14 PROCEDURE — 93298 REM INTERROG DEV EVAL SCRMS: CPT | Performed by: INTERNAL MEDICINE

## 2021-09-14 PROCEDURE — G2066 INTER DEVC REMOTE 30D: HCPCS | Performed by: INTERNAL MEDICINE

## 2021-09-14 NOTE — PROGRESS NOTES
We received a remote transmission from patient's monitor at home. Remote Linq report shows AF and tachy recordings. Pt remains on Eliquis. EP physician to review. We will continue to monitor remotely.

## 2021-09-14 NOTE — RESULT ENCOUNTER NOTE
Reviewed. Afib with RVR. Rate not controlled. 08-Sep-2021 04:18:00 Max rate 222 bpm Avg rate 125 bpm    Follow up with Dr. Mindy Vogel.

## 2021-09-24 ENCOUNTER — TELEPHONE (OUTPATIENT)
Dept: DERMATOLOGY | Age: 66
End: 2021-09-24

## 2021-09-24 NOTE — TELEPHONE ENCOUNTER
Pt calling LM on  yesterday @ 4:52pm states he has a spot on his lower right arm states it is dark black and purple color it itch with some pain have some concern he has a appt in October wants to see if he can get that appt moved up sooner pls return call back @ [phone] to discuss

## 2021-09-27 NOTE — TELEPHONE ENCOUNTER
Patient scheduled for 10/14/021. Will call with cancellations. M for patient relaying the following.

## 2021-10-12 NOTE — PROGRESS NOTES
[address]   Electrophysiology  BRADLEY Ruffin  Attending EP: Dr. Roby Stanford    Date: 11/10/2021  I had the privilege of visiting Dk Alba in the office. Chief Complaint:   Chief Complaint   Patient presents with    Atrial Fibrillation     No complaints     Follow-up     8 months      History of Present Illness: History obtained from patient and medical record. Dk Alba is 74 y.o. male with a past medical history of HLD, asthma, PFO, PAF, SVT, and TIA. S/p ILR implant (10/19)    -Interval history: Today, Dk Alba is being seen for routine follow up. He is doing pretty well. He is in sinus rhythm on EKG today. His loop interrogation shows episodes of PAF/PAT. He has symptoms with some of the episodes, particularly the longer episodes. He feels his heart racing and SOB. The episodes occur with rest and activity. He is wondering what he can do to help his AF burden. His burden is still low overall at 0.7%. We discussed treatment options. His BP is well controlled, 112/70. He does not know if he snores, but admits to day time fatigue. He has not been checked for sleep apnea since the 1990s. He is back working at [employer] temporarily. Denies having chest pain, palpitations, shortness of breath, orthopnea/PND, cough, or dizziness at the time of this visit. With regard to medication therapy the patient has been compliant with prescribed regimen. They have tolerated therapy to date. Allergies: Allergies   Allergen Reactions    Percocet [Oxycodone-Acetaminophen] Other (See Comments)     constipation     Home Medications:  Prior to Visit Medications    Medication Sig Taking? Authorizing Provider   apixaban (ELIQUIS) 5 MG TABS tablet Take 1 tablet by mouth 2 times daily Yes Minor Habermann, APRN - CNP   fluorouracil (EFUDEX) 5 % cream Apply topically 2 times daily to lesion on the nose x 2-4 weeks or until significant irritation on the skin. Avoid the eyes.  Yes Maco Flor bleeding, blood clots, or changes in skin pigment  · HEENT: Negative for vision changes, ringing in the ears, sore throat, dysphagia, or swollen lymph nodes  · Respiratory: Reviewed in HPI  · Cardiovascular: Reviewed in HPI  · Gastrointestinal: Negative for abdominal pain, N/V/D, constipation, or black/tarry stools  · Genito-Urinary: Negative for dysuria, incontinence, urgency, or hematuria  · Musculoskeletal: Negative for joint swelling, muscle pain, or injuries  · Neurological/Psych: Negative for confusion, seizures, dizziness, headaches, balance issues or TIA-like symptoms. No anxiety, depression, or insomnia    Physical Examination:  Vitals:    11/10/21 1431   BP: 112/70   Pulse: 70   SpO2: 98%      Wt Readings from Last 3 Encounters:   11/10/21 202 lb 9.6 oz (91.9 kg)   08/13/21 198 lb (89.8 kg)   03/25/21 200 lb 12.8 oz (91.1 kg)     Constitutional: Cooperative and in no apparent distress, and appears well nourished  Skin: Warm and pink; no pallor, cyanosis, bruising, or clubbing  HEENT: Symmetric and normocephalic. PERRL, EOM intact. Conjunctiva pink with clear sclera. Mucus membranes pink and moist. Teeth intact. Thyroid smooth without nodules or goiter  Respiratory: Respirations symmetric and unlabored. Lungs clear to auscultation bilaterally, no wheezing, rhonchi, or crackles  Cardiovascular:  Regular rate and rhythm. S1/S2 present without murmurs, rubs, or gallops. Peripheral pulses 2+, capillary refill < 3 seconds. No elevation of JVP. No peripheral edema  Gastrointestinal: Abdomen soft and round. Bowel sounds normoactive in all quadrants without tenderness or masses. Musculoskeletal: Bilateral upper and lower extremity strength 5/5 with full ROM. Neurological/Psych: Awake and orientated to person, place and time. Calm affect, appropriate mood.      Pertinent labs, diagnostic, device, and imaging results reviewed as a part of this visit    LABS    CBC:   Lab Results   Component Value Date    WBC 6.7 2020    HGB 15.7 2020    HCT 47.0 2020    MCV 89.5 2020     2020     BMP:   Lab Results   Component Value Date    CREATININE 1.0 2020    BUN 20 2020     2020    K 4.6 2020     2020    CO2 26 2020     CrCl cannot be calculated (Patient's most recent lab result is older than the maximum 120 days allowed. ). Thyroid:   Lab Results   Component Value Date    TSH 2.54 2019     Lipid Panel:   Lab Results   Component Value Date    CHOL 162 2020    HDL 55 2020    HDL 62 2011    TRIG 112 2020     LFTs:  Lab Results   Component Value Date    ALT 25 2020    AST 25 2020    ALKPHOS 89 2020    BILITOT 0.6 2020     Coags:   Lab Results   Component Value Date    PROTIME 25.4 10/18/2019    INR 2.53 10/18/2019    APTT 30.3 2013       EC/10/2021  - SB, rate 57, QTc 429    Echo:    There is evidence of right-to-left intracardiac shunting/patent foramen   ovale by agitated saline bubble contrast study. Normal left ventricular size, wall thickness & systolic function. Normal left ventricular inflow profile. There are no significant segmental wall motion abnormalities. The left ventricular ejection fraction is calculated at 75 %. GXT: None    Assessment:    1. Paroxysmal Atrial Fibrillation  - Currently in NSR/SB  - Continue cardizem 180 mg QD   ~ Limited in medical therapy due to low resting HR    - [id number] score: 3 (Age, HTN, TIA) ; [id number] Vasc score and anticoagulation discussed. High risk for stroke and thromboembolism. Anticoagulation is recommended. Risk of bleeding was discussed.  ~ On Eliquis 5 mg BID; tolerating well    - Afib risk factors including age, HTN, obesity, inactivity and ORQUIDEA were discussed with patient.  Risk factor modification recommended   ~ TSH 2.54 (3/19)   ~ Need sleep evaluation      - Treatment options including cardioversion, rate control strategy, antiarrhythmics, anticoagulation and possible ablation were discussed with patient. Risks, benefits and alternative of each treatment options were explained. All questions answered    ~ Pt will consider an ablation if his burden worsens as medication options are limited given his low baseline HR. He will check to see if he has sleep apnea to see if his burden improves with adequate tx    2. Implantable Loop Recorder  - S/p ILR insertion on 10/19  -The CIED was interrogated and programmed and I supervised and reviewed all the data. All findings and changes are in device interrogation sheat and reflect my personal interpretation and changes and is scanned to Epic  - Device shows: AF burden 0.7% (Episodes lasting 1-7 hours; last on November 1st)  - Follow up with device clinic as scheduled    3. SVT   - Noted on loop   - Continue CCB    4. HTN  - Controlled: Goal <130/80  - Continue current medications  - Encouraged to monitor and log BP readings at home, then bring log to next visit  - Discussed importance of low sodium diet, weight control and exercise    5. At Risk for Sleep Apnea  - Multiple risk factors for ORQUIDEA  - Discussed long term effects of untreated ORQUIDEA  - Referral to sleep study center    6. Hx of TIA   - On Eliquis, plavix, rosuvastatin    7. PFO   - On Eliquis   - Followed by Dr. Venkata Gibson    8. Hyperlipidemia  - Controlled. Goal: LDL <100   ~ LDL 85 (12/20)  - On rosuvastatin 5 mg QD  - Discussed diet, weight loss, and exercise needs  - Followed per PCP    Plan:  1. No changes for now  2. Check sleep study  3.  Call if afib burden worsens    F/U: Follow-up with NPSR in 6 months  -Follow up with device clinic as scheduled  -Call Daniel Andrews at [phone] with any questions    Diet & Exercise:   The patient is counseled to follow a low salt diet to assure blood pressure remains controlled for cardiovascular risk factor modification   The patient is counseled to avoid excess caffeine, and energy drinks as this may exacerbated ectopy and arrhythmia   The patient is counseled to lose weight to control cardiovascular risk factors   Exercise program discussed: To improve overall cardiovascular health, the patient is instructed to increase cardiovascular related activities with a goal of 150 min/week of moderate level activity or 10,000 steps per day. Encouraged to perform as much activity as tolerated     I have addressed the patient's cardiac risk factors and adjusted pharmacologic treatment as needed. In addition, I have reinforced the need for patient directed risk factor modification. I independently reviewed the device check interrogation and ECG    All questions and concerns were addressed with the patient. Alternatives to treatment were discussed. Thank you for allowing to us to participate in the care of Dk AYON Anjali     33 minutes spent preparing to see patient including reviewing patient history/prior tests/prior consults, performing a medical exam, counseling and educating patient/family/caregiver, ordering medications/tests/procedures, referring and communicating with PCPs and other pertinent consultants, documenting information in the EMR, independently interpreting results and communicating to family and coordination of patient care.      YVETTE Ruffin-CNP  [facility]   Office: [phone]

## 2021-10-14 ENCOUNTER — OFFICE VISIT (OUTPATIENT)
Dept: DERMATOLOGY | Age: 66
End: 2021-10-14
Payer: COMMERCIAL

## 2021-10-14 VITALS — TEMPERATURE: 98 F

## 2021-10-14 DIAGNOSIS — L57.0 AK (ACTINIC KERATOSIS): ICD-10-CM

## 2021-10-14 DIAGNOSIS — D23.9 DERMATOFIBROMA: ICD-10-CM

## 2021-10-14 DIAGNOSIS — D22.9 MULTIPLE NEVI: ICD-10-CM

## 2021-10-14 DIAGNOSIS — Z85.828 HISTORY OF NONMELANOMA SKIN CANCER: Primary | ICD-10-CM

## 2021-10-14 DIAGNOSIS — L82.1 SEBORRHEIC KERATOSIS: ICD-10-CM

## 2021-10-14 DIAGNOSIS — D48.5 NEOPLASM OF UNCERTAIN BEHAVIOR OF SKIN: ICD-10-CM

## 2021-10-14 DIAGNOSIS — D17.1 LIPOMA OF CHEST WALL: ICD-10-CM

## 2021-10-14 PROCEDURE — 99213 OFFICE O/P EST LOW 20 MIN: CPT | Performed by: DERMATOLOGY

## 2021-10-14 PROCEDURE — 11103 TANGNTL BX SKIN EA SEP/ADDL: CPT | Performed by: DERMATOLOGY

## 2021-10-14 PROCEDURE — 11102 TANGNTL BX SKIN SINGLE LES: CPT | Performed by: DERMATOLOGY

## 2021-10-14 NOTE — PATIENT INSTRUCTIONS
Protecting Yourself From the Sun    · Apply an over-the-counter broad spectrum water resistant sunscreen with an SPF of at least 30 to exposed areas of the skin. Dont forget the ears and lips! Remember to reapply sunscreen about every 2 hours and after swimming or sweating. · Wear sun protective clothing. Swim shirts (aka. rash guards) are a great idea and negates the need to reapply sunscreen in those areas. · Seek the shade whenever possible especially between the hours of 10 am and 4 pm when the suns rays are the strongest.     · Avoid tanning beds        Biopsy Wound Care Instructions    · Keep the bandage in place for 24 hours. · Cleanse the wound with mild soapy water daily   Gently dry the area.  Apply Vaseline or petroleum jelly to the wound using a cotton tipped applicator.  Cover with a clean bandage.  Repeat this process until the biopsy site is healed.  If you had stitches placed, continue treating the site until the stitches are removed. Remember to make an appointment to return to have your stitches removed by our staff.  You may shower and bathe as usual.       ** Biopsy results generally take around 7 business days to come back. If you have not heard from us by then, please call the office at [phone]. *Please note that biopsy results are released to both the patient and physician at the same time in [address] Please allow time for your physician to review the results. One of our staff members will reach out to you with the results and plan. Cryosurgery (Freezing) Wound Care Instructions    AFTER THE PROCEDURE:    You will notice swelling and redness around the site. This is normal.    You may experience a sharp or sore feeling for the next several days. For this discomfort, you may take acetaminophen (Tylenol©).  A blister may develop at the treated area, sometimes as soon as by the end of the day.  After several days, the blister will subside and a scab will form.  If the area is bumped or traumatized during the first few days following freezing, you may develop bleeding into the blister, forming a blood blister. This is nothing to be alarmed about.  If the blister is tense, uncomfortable, or much larger than the site that was frozen, you may pop the blister along its edge with a sterile needle (boiled, heated under a flame, or cleaned with alcohol) to allow the fluid to drain out. If the blister does not bother you, no treatment is needed.  Do NOT peel off the top of the blister roof. It will act as a dressing on top of your wound. WOUND CARE:    You may shower or bathe as usual, but avoid scrubbing the areas that have been frozen.  Cleanse the site twice a day with mild soapy water, and then apply a thin film of white petrolatum (Vaseline©).  You do not need to cover the area, but can if you prefer.  Do NOT allow the site to become dry or crusted, or attempt to dry it out with rubbing alcohol or hydrogen peroxide.  Continue this regimen until the area is pink and healed. Depending on the size and location of your cryosurgery site, healing may take 2 to 4 weeks.  The area may continue to be pink for several weeks, and over the next few months may become darker or lighter than the surrounding skin. This may be a permanent change.    

## 2021-10-14 NOTE — PROGRESS NOTES
[facility]at[facility]  Mk Hernandez MD  [phone]      Dk Alba  1955    74 y.o. male     Date of Visit: 10/14/2021    Chief Complaint: f/u skin cancer, lesions  Chief Complaint   Patient presents with    Skin Exam     Last seen:   *had a TIA in the summer 2019 - on plavix now  *went to [facility] - 39 yr reunion cancalled summer 2020    History of Present Illness:    1. Here for f/u for skin cancer. S/p Mohs 6-2015 for nod BCC on the R nasal sidewall and then scar revision. No probs with this site - scar looks great and no pain, no bleeding, no concerns. 2. Here for a skin check for evaluation of multiple asx moles on the trunk and extremities, present for many years; no change in size/shape/color of any lesions; no bleeding lesions. 3. Hx of AK's -few new concerns on the face and scalp. 4. He has a firm papule on the L popliteal area. Asymptomatic    5. Concerning lesion recently noticed:  R nasal dorsum - r/o FP vs NMSC  R extensor FA - r/o blue nevus     6. He notes an asx masx on the L upper chest/shoulder - noticed since last seen - saw PCP and diagnosed with lipoma. Previously discussed flushing with niacin. No personal or family hx of skin cancer. Review of Systems:  Gen: Feels well, good sense of health. Skin: No changing moles or lesions. No new rashes. Past Medical History, Family History, Surgical History, Medications and Allergies reviewed. Past Medical History:   Diagnosis Date    Basal cell carcinoma of right nasal sidewall 6/4/2015    BPH (benign prostatic hyperplasia) 10/18/2010    Colon, diverticulosis 12/24/2018    Hyperlipidemia 10/18/2010    Hypertension 3/22/2021    Intermittent atrial fibrillation ([address] 10/13/2020    Nephrolithiasis     s/p lithotripsy x 4, calcium oxalate.      PFO (patent foramen ovale)     Raynaud's disease 12/4/2014    Wears glasses        Past Surgical History:   Procedure Laterality Date    COLONOSCOPY  2007    q 10,5/2018    CYSTOSCOPY  11/14/13     CYSTOSCOPY TRANSURETHRAL RESECTION PROSTATE    HERNIA REPAIR      KIDNEY STONE SURGERY Left 8/29/13    perc. nephrolithotomy,stint placement    MOHS SURGERY  06/2015    BCC-Dr. Wade Muñiz rt nasal sidewall    OTHER SURGICAL HISTORY      perirectal abscess 1992    PROSTATE SURGERY  4/05    biopsy       Outpatient Medications Marked as Taking for the 10/14/21 encounter (Office Visit) with Arabella Bahena MD   Medication Sig Dispense Refill    losartan (COZAAR) 100 MG tablet Take 1 tablet by mouth daily 90 tablet 2    rosuvastatin (CRESTOR) 5 MG tablet Take 1 tablet by mouth daily 90 tablet 3    dilTIAZem (CARDIZEM CD) 180 MG extended release capsule Take 1 capsule by mouth daily 90 capsule 3    Coenzyme Q-10 200 MG CAPS       apixaban (ELIQUIS) 5 MG TABS tablet Take 1 tablet by mouth 2 times daily 180 tablet 3    IPRATROPIUM BROMIDE NA by Nasal route      chlorpheniramine (CHLOR-TRIMETON) 4 MG tablet Take 4 mg by mouth every 6 hours as needed for Allergies      hydrocortisone (WESTCORT) 0.2 % cream Apply topically 2 times daily. 30 g 2    Multiple Vitamins-Minerals (MULTI FOR HIM 50+) TABS Take  by mouth. Allergies   Allergen Reactions    Percocet [Oxycodone-Acetaminophen] Other (See Comments)     constipation         Physical Examination     Gen, NAD    FSE today    Scar clear  trunk and extremities with scattered brown macules and papules including stable dark obrowm macule on the adomen  L popliteal area with firm dermal pinkish 5 mm papule   Scalp and L cheek with stuck-on dull-surfaced brown papules  R nasal dorsum with 1-2 mm pink papule  R extensor FA with brown-gray 2 mm thin papule  L chest/shoulder with mobile several cm subcut nodule    Assessment and Plan     1. Hx of NMSC, no signs recurrence  2.  SK's and benign-appearing nevi  - educ re ABCD's of MM   educ sun protection SPF 30+  encouraged skin check yearly (sooner if indicated), self checks    3. AK's - frontal scalp and FH clear from previous trx  - cont sun protection    4. L popliteal area - DF  - reassured    5. R nasal dorsum - r/o FP vs NMSC  R extensor FA - r/o blue nevus  - 2 Shave biopsy performed after verbal consent obtained. Patient educated regarding risk of bleeding, infection, scar and educated on wound care. Skin cleansed with alcohol pad and site anesthetized with lido + epi. Aluminum chloride applied to site for hemostasis. Petrolatum ointment and bandage applied. Specimen bottle labeled with patient information and site and specimen sent to dermpath.      6. Lipoma - L chest/shoulder   - reassured and discussed benign fx  - ed excision if sx, growth - gen surgery    Previously discussed - Small epidermoid cyst - scrotum  R great toe - myxoid cyst - asx

## 2021-10-18 LAB — DERMATOLOGY PATHOLOGY REPORT: NORMAL

## 2021-10-19 ENCOUNTER — NURSE ONLY (OUTPATIENT)
Dept: CARDIOLOGY CLINIC | Age: 66
End: 2021-10-19
Payer: COMMERCIAL

## 2021-10-19 DIAGNOSIS — I47.1 SVT (SUPRAVENTRICULAR TACHYCARDIA) (HCC): ICD-10-CM

## 2021-10-19 DIAGNOSIS — Z45.09 ENCOUNTER FOR LOOP RECORDER CHECK: ICD-10-CM

## 2021-10-19 PROCEDURE — G2066 INTER DEVC REMOTE 30D: HCPCS | Performed by: INTERNAL MEDICINE

## 2021-10-19 PROCEDURE — 93298 REM INTERROG DEV EVAL SCRMS: CPT | Performed by: INTERNAL MEDICINE

## 2021-10-24 RX ORDER — FLUOROURACIL 50 MG/G
CREAM TOPICAL
Qty: 40 G | Refills: 0 | Status: SHIPPED | OUTPATIENT
Start: 2021-10-24 | End: 2021-12-08

## 2021-10-25 ENCOUNTER — TELEPHONE (OUTPATIENT)
Dept: DERMATOLOGY | Age: 66
End: 2021-10-25

## 2021-10-25 NOTE — TELEPHONE ENCOUNTER
Pt of    Pt was seen on 10/14   Pt received new insurance after the appt  He was prescribed  EFUDEX, and wanted to discuss and to see if it was alright to wait to take the medication after he figures out his insurance. Please Dinah Mims, Thank you!

## 2021-11-08 NOTE — TELEPHONE ENCOUNTER
[facility]     Last ov:3/25/2021 MXA    Last labs:cmp, cbc 12/3/2020    Last EKG:3/25/2021    Last Refill:10/20/2020 #180 with 3 refills    Next appointment:11/10/2021 NPSR

## 2021-11-10 ENCOUNTER — NURSE ONLY (OUTPATIENT)
Dept: CARDIOLOGY CLINIC | Age: 66
End: 2021-11-10

## 2021-11-10 ENCOUNTER — OFFICE VISIT (OUTPATIENT)
Dept: CARDIOLOGY CLINIC | Age: 66
End: 2021-11-10
Payer: COMMERCIAL

## 2021-11-10 VITALS
WEIGHT: 202.6 LBS | HEART RATE: 70 BPM | OXYGEN SATURATION: 98 % | HEIGHT: 71 IN | SYSTOLIC BLOOD PRESSURE: 112 MMHG | BODY MASS INDEX: 28.36 KG/M2 | DIASTOLIC BLOOD PRESSURE: 70 MMHG

## 2021-11-10 DIAGNOSIS — Z91.89 AT RISK FOR SLEEP APNEA: ICD-10-CM

## 2021-11-10 DIAGNOSIS — Q21.12 PFO (PATENT FORAMEN OVALE): ICD-10-CM

## 2021-11-10 DIAGNOSIS — E78.5 HYPERLIPIDEMIA, UNSPECIFIED HYPERLIPIDEMIA TYPE: ICD-10-CM

## 2021-11-10 DIAGNOSIS — I10 HYPERTENSION, UNSPECIFIED TYPE: ICD-10-CM

## 2021-11-10 DIAGNOSIS — Z45.09 ENCOUNTER FOR LOOP RECORDER CHECK: ICD-10-CM

## 2021-11-10 DIAGNOSIS — I47.1 SVT (SUPRAVENTRICULAR TACHYCARDIA) (HCC): Primary | ICD-10-CM

## 2021-11-10 PROCEDURE — 4040F PNEUMOC VAC/ADMIN/RCVD: CPT | Performed by: NURSE PRACTITIONER

## 2021-11-10 PROCEDURE — G8427 DOCREV CUR MEDS BY ELIG CLIN: HCPCS | Performed by: NURSE PRACTITIONER

## 2021-11-10 PROCEDURE — G8417 CALC BMI ABV UP PARAM F/U: HCPCS | Performed by: NURSE PRACTITIONER

## 2021-11-10 PROCEDURE — 93000 ELECTROCARDIOGRAM COMPLETE: CPT | Performed by: NURSE PRACTITIONER

## 2021-11-10 PROCEDURE — 1123F ACP DISCUSS/DSCN MKR DOCD: CPT | Performed by: NURSE PRACTITIONER

## 2021-11-10 PROCEDURE — 3017F COLORECTAL CA SCREEN DOC REV: CPT | Performed by: NURSE PRACTITIONER

## 2021-11-10 PROCEDURE — 1036F TOBACCO NON-USER: CPT | Performed by: NURSE PRACTITIONER

## 2021-11-10 PROCEDURE — G8484 FLU IMMUNIZE NO ADMIN: HCPCS | Performed by: NURSE PRACTITIONER

## 2021-11-10 PROCEDURE — 99214 OFFICE O/P EST MOD 30 MIN: CPT | Performed by: NURSE PRACTITIONER

## 2021-11-13 NOTE — PROGRESS NOTES
Patient comes in for interrogation of their implanted loop recorder. Interrogation shows AF with a 0.7%. RVR. Noted. Last on 11/1/2021. Patient symptomatic. Implanted for AF management/TIA. Patient remains on Eliquis and Cardizem. Please see interrogation for more detail. Patient will see NPSR today and we will continue to follow the Patient remotely.

## 2021-11-23 ENCOUNTER — NURSE ONLY (OUTPATIENT)
Dept: CARDIOLOGY CLINIC | Age: 66
End: 2021-11-23
Payer: COMMERCIAL

## 2021-11-23 DIAGNOSIS — I47.1 SVT (SUPRAVENTRICULAR TACHYCARDIA) (HCC): ICD-10-CM

## 2021-11-23 DIAGNOSIS — Z45.09 ENCOUNTER FOR LOOP RECORDER CHECK: ICD-10-CM

## 2021-11-23 PROCEDURE — G2066 INTER DEVC REMOTE 30D: HCPCS | Performed by: INTERNAL MEDICINE

## 2021-11-23 PROCEDURE — 93298 REM INTERROG DEV EVAL SCRMS: CPT | Performed by: INTERNAL MEDICINE

## 2021-11-23 NOTE — RESULT ENCOUNTER NOTE
Reviewed.      Symptomatic atrial fibrillation, longest on   01-Nov-2021 15:44 07:22:00 post ercp pancreatitis  slowly improving  cta chest reviewed w radiology  ivf, start clears  give relistor x1  ppi, anti emetics  pain management  oob as tolerated  will follow

## 2021-11-26 ENCOUNTER — PATIENT MESSAGE (OUTPATIENT)
Dept: DERMATOLOGY | Age: 66
End: 2021-11-26

## 2021-11-27 DIAGNOSIS — I48.0 INTERMITTENT ATRIAL FIBRILLATION (HCC): ICD-10-CM

## 2021-11-27 DIAGNOSIS — Z00.00 ROUTINE GENERAL MEDICAL EXAMINATION AT A HEALTH CARE FACILITY: ICD-10-CM

## 2021-11-27 DIAGNOSIS — E78.2 MIXED HYPERLIPIDEMIA: ICD-10-CM

## 2021-11-27 DIAGNOSIS — R97.20 PSA ELEVATION: Primary | ICD-10-CM

## 2021-11-27 DIAGNOSIS — Z12.5 PROSTATE CANCER SCREENING: ICD-10-CM

## 2021-11-27 DIAGNOSIS — R73.9 HYPERGLYCEMIA: ICD-10-CM

## 2021-11-28 ENCOUNTER — PATIENT MESSAGE (OUTPATIENT)
Dept: CARDIOLOGY CLINIC | Age: 66
End: 2021-11-28

## 2021-11-29 ENCOUNTER — PATIENT MESSAGE (OUTPATIENT)
Dept: DERMATOLOGY | Age: 66
End: 2021-11-29

## 2021-11-29 RX ORDER — DILTIAZEM HYDROCHLORIDE 180 MG/1
180 CAPSULE, COATED, EXTENDED RELEASE ORAL DAILY
Qty: 90 CAPSULE | Refills: 3 | Status: SHIPPED | OUTPATIENT
Start: 2021-11-29 | End: 2021-12-16

## 2021-11-29 NOTE — TELEPHONE ENCOUNTER
Received refill request for diltiazem from [facility].     Last ov:11/10/2021 NPSR    Last labs:cmp 12/3/2020    Last EK/10/2021    Last Refill:2020 90 with 3 refills    Next appointment:2022 MARTÍN

## 2021-11-29 NOTE — TELEPHONE ENCOUNTER
From: Dk Alba  To: Dr. Herminia Mccabe  Sent: 11/28/2021 8:18 PM EST  Subject: New Prescription for Diltiazem    Hi Dr. Edie Donovan or Irving Anderson,     I've recently switched Health Insurance providers. I was with [facility], now I'm with [employer] (your staff has my new information). Because of this, I need to request a new 90-day prescription (with 3 refills) for Diltiazem, 180 mg. So I submitted an order for this prescription on the Health Plan's online Pharmacy, [facility]. You should be receiving a request from them for this prescription. I will run out of my last 90-day supply (filled 9/15/2021) on December 25.     Thanks,  Kimani Alba

## 2021-11-29 NOTE — TELEPHONE ENCOUNTER
From: Dk Alba  To: Dr. Noel Layer: 11/29/2021 6:47 AM EST  Subject: How long to apply fluorouracil    Hi Dr Angeli Wilson. I have now been applying Fluorouracil for two weeks and three days, twice per day. I apply it on the nose area where you removed the tiny pre-cancerous growth, and allow it to stay on my nose throughout the day. My nose is now very red and the skin is turning brown and starting to look like it is going to peel and start bleeding. Do I continue beyond this, or do I stop applying the Fluorouracil at this point? The prescription instructions said to apply it for 2 to 4 weeks.     Thanks,  Andrea Alba

## 2021-11-29 NOTE — TELEPHONE ENCOUNTER
From: Dk Alba  To: Dr. Carlos Bhagat: 11/26/2021 6:58 PM EST  Subject: Triamcinolone Acetonide (Kenalog) Still Listed in [facility] Dr. Geni Green,     When I saw you on October 14, 2021 for an annual dermatology exam, we discussed two medication listings for Triamcinolone Acetonide (Kenalog) that are showing up on every After Visit Summary in [facility], for every medical appointment I have, for all doctors. This has been happening now for 6 years, since an appointment during which skin growths were removed in 2015 (apparently). These listings are for \"Medications You Will Be Given. \" You indicated that you would have your staff work on getting these removed from [facility] . One of those has since (after my October 14, 2021 office visit with you) been successfully removed from [facility]. The other one for this medication is still showing in every After Visit Summary, with a date of Apr 19, 2015, and is displayed with the following text: \"Next due Sunday April 19 (Overdue), Expected once (1 dose remaining). \"     Could you please ask your staff to have this medication listing removed as well, just as they were successful in removing the other listing for Triamcinolone Acetonide?     Sincerely,  Zacarias Alba (Alaric)

## 2021-12-03 DIAGNOSIS — E78.2 MIXED HYPERLIPIDEMIA: ICD-10-CM

## 2021-12-03 DIAGNOSIS — R73.9 HYPERGLYCEMIA: ICD-10-CM

## 2021-12-03 DIAGNOSIS — Z12.5 PROSTATE CANCER SCREENING: ICD-10-CM

## 2021-12-03 DIAGNOSIS — I48.0 INTERMITTENT ATRIAL FIBRILLATION (HCC): ICD-10-CM

## 2021-12-03 LAB
A/G RATIO: 2.8 (ref 1.1–2.2)
ALBUMIN SERPL-MCNC: 4.8 G/DL (ref 3.4–5)
ALP BLD-CCNC: 112 U/L (ref 40–129)
ALT SERPL-CCNC: 27 U/L (ref 10–40)
ANION GAP SERPL CALCULATED.3IONS-SCNC: 15 MMOL/L (ref 3–16)
AST SERPL-CCNC: 27 U/L (ref 15–37)
BASOPHILS ABSOLUTE: 0 K/UL (ref 0–0.2)
BASOPHILS RELATIVE PERCENT: 0.7 %
BILIRUB SERPL-MCNC: 0.3 MG/DL (ref 0–1)
BUN BLDV-MCNC: 21 MG/DL (ref 7–20)
CALCIUM SERPL-MCNC: 9.7 MG/DL (ref 8.3–10.6)
CHLORIDE BLD-SCNC: 101 MMOL/L (ref 99–110)
CHOLESTEROL, TOTAL: 189 MG/DL (ref 0–199)
CO2: 23 MMOL/L (ref 21–32)
CREAT SERPL-MCNC: 1.2 MG/DL (ref 0.8–1.3)
EOSINOPHILS ABSOLUTE: 0 K/UL (ref 0–0.6)
EOSINOPHILS RELATIVE PERCENT: 0.7 %
GFR AFRICAN AMERICAN: >60
GFR NON-AFRICAN AMERICAN: >60
GLUCOSE BLD-MCNC: 82 MG/DL (ref 70–99)
HCT VFR BLD CALC: 49.3 % (ref 40.5–52.5)
HDLC SERPL-MCNC: 58 MG/DL (ref 40–60)
HEMOGLOBIN: 15.7 G/DL (ref 13.5–17.5)
LDL CHOLESTEROL CALCULATED: 107 MG/DL
LYMPHOCYTES ABSOLUTE: 1 K/UL (ref 1–5.1)
LYMPHOCYTES RELATIVE PERCENT: 18.5 %
MCH RBC QN AUTO: 29.2 PG (ref 26–34)
MCHC RBC AUTO-ENTMCNC: 31.9 G/DL (ref 31–36)
MCV RBC AUTO: 91.7 FL (ref 80–100)
MONOCYTES ABSOLUTE: 0.5 K/UL (ref 0–1.3)
MONOCYTES RELATIVE PERCENT: 10 %
NEUTROPHILS ABSOLUTE: 3.7 K/UL (ref 1.7–7.7)
NEUTROPHILS RELATIVE PERCENT: 70.1 %
PDW BLD-RTO: 13.8 % (ref 12.4–15.4)
PLATELET # BLD: 200 K/UL (ref 135–450)
PMV BLD AUTO: 8.4 FL (ref 5–10.5)
POTASSIUM SERPL-SCNC: 4.6 MMOL/L (ref 3.5–5.1)
PROSTATE SPECIFIC ANTIGEN: 5.9 NG/ML (ref 0–4)
RBC # BLD: 5.38 M/UL (ref 4.2–5.9)
SODIUM BLD-SCNC: 139 MMOL/L (ref 136–145)
TOTAL PROTEIN: 6.5 G/DL (ref 6.4–8.2)
TRIGL SERPL-MCNC: 122 MG/DL (ref 0–150)
VLDLC SERPL CALC-MCNC: 24 MG/DL
WBC # BLD: 5.2 K/UL (ref 4–11)

## 2021-12-04 LAB
ESTIMATED AVERAGE GLUCOSE: 111.2 MG/DL
HBA1C MFR BLD: 5.5 %

## 2021-12-08 ENCOUNTER — OFFICE VISIT (OUTPATIENT)
Dept: FAMILY MEDICINE CLINIC | Age: 66
End: 2021-12-08
Payer: COMMERCIAL

## 2021-12-08 VITALS
HEIGHT: 71 IN | OXYGEN SATURATION: 98 % | WEIGHT: 211.4 LBS | DIASTOLIC BLOOD PRESSURE: 60 MMHG | BODY MASS INDEX: 29.6 KG/M2 | SYSTOLIC BLOOD PRESSURE: 110 MMHG | HEART RATE: 90 BPM

## 2021-12-08 DIAGNOSIS — E78.5 HYPERLIPIDEMIA, UNSPECIFIED HYPERLIPIDEMIA TYPE: ICD-10-CM

## 2021-12-08 DIAGNOSIS — R97.20 PSA ELEVATION: ICD-10-CM

## 2021-12-08 DIAGNOSIS — I10 HYPERTENSION, UNSPECIFIED TYPE: ICD-10-CM

## 2021-12-08 DIAGNOSIS — Z00.00 WELL ADULT EXAM: Primary | ICD-10-CM

## 2021-12-08 DIAGNOSIS — Z23 NEED FOR TDAP VACCINATION: ICD-10-CM

## 2021-12-08 DIAGNOSIS — I48.0 INTERMITTENT ATRIAL FIBRILLATION (HCC): ICD-10-CM

## 2021-12-08 PROCEDURE — 90715 TDAP VACCINE 7 YRS/> IM: CPT | Performed by: FAMILY MEDICINE

## 2021-12-08 PROCEDURE — 90471 IMMUNIZATION ADMIN: CPT | Performed by: FAMILY MEDICINE

## 2021-12-08 PROCEDURE — G8484 FLU IMMUNIZE NO ADMIN: HCPCS | Performed by: FAMILY MEDICINE

## 2021-12-08 PROCEDURE — 99397 PER PM REEVAL EST PAT 65+ YR: CPT | Performed by: FAMILY MEDICINE

## 2021-12-08 ASSESSMENT — PATIENT HEALTH QUESTIONNAIRE - PHQ9
SUM OF ALL RESPONSES TO PHQ QUESTIONS 1-9: 0
1. LITTLE INTEREST OR PLEASURE IN DOING THINGS: 0
SUM OF ALL RESPONSES TO PHQ9 QUESTIONS 1 & 2: 0
SUM OF ALL RESPONSES TO PHQ QUESTIONS 1-9: 0
2. FEELING DOWN, DEPRESSED OR HOPELESS: 0
SUM OF ALL RESPONSES TO PHQ QUESTIONS 1-9: 0

## 2021-12-08 NOTE — PROGRESS NOTES
History and Physical      Dk Alba  YOB: 1955    Date of Service:  12/8/2021    Chief Complaint:   Sulaiman Alba is a 74 y.o. male who presents for complete physical examination.     HPI: cpx  No cc    Wt Readings from Last 3 Encounters:   12/08/21 211 lb 6.4 oz (95.9 kg)   11/10/21 202 lb 9.6 oz (91.9 kg)   08/13/21 198 lb (89.8 kg)     BP Readings from Last 3 Encounters:   12/08/21 110/60   11/10/21 112/70   08/13/21 120/70       Patient Active Problem List   Diagnosis    Hyperlipidemia    BPH with urinary obstruction    Well adult exam    Nephrolithiasis    RBBB    Raynaud's disease    Rhinitis due food    History of basal cell cancer    Chronic throat clearing    Atrophy of right kidney    Colon, diverticulosis    PFO (patent foramen ovale)    Encounter for loop recorder check    PSA elevation    Ganglion cyst of foot    Intermittent atrial fibrillation (HCC)    Hypertension    SVT (supraventricular tachycardia) (HCC)    PAC (premature atrial contraction)    PVC (premature ventricular contraction)    Status post placement of implantable loop recorder    Lipoma of left upper extremity    At risk for sleep apnea       Preventive Care:  Health Maintenance   Topic Date Due    DTaP/Tdap/Td vaccine (2 - Td or Tdap) 11/22/2021    Lipid screen  12/03/2022    Potassium monitoring  12/03/2022    Creatinine monitoring  12/03/2022    PSA counseling  12/03/2022    Colon cancer screen colonoscopy  05/08/2028    Flu vaccine  Completed    Shingles Vaccine  Completed    Pneumococcal 65+ years Vaccine  Completed    COVID-19 Vaccine  Completed    Hepatitis C screen  Completed    Hepatitis A vaccine  Aged Out    Hepatitis B vaccine  Aged Out    Hib vaccine  Aged Out    Meningococcal (ACWY) vaccine  Aged Out      Self-testicular exams: Yes  Sexual activity: single partner, contraception - none   Last eye exam: -, normal  Exercise: no regular exercise  Seatbelt use: +  Lipid panel:    Lab Results   Component Value Date    CHOL 189 12/03/2021    TRIG 122 12/03/2021    HDL 58 12/03/2021    LDLCALC 107 (H) 12/03/2021       Living will: yes,   copy requested    Immunization History   Administered Date(s) Administered    COVID-19, Pfizer, PF, 30mcg/0.3mL 03/10/2021, 04/03/2021, 10/08/2021    Influenza 11/12/2015    Influenza Vaccine, unspecified formulation 10/01/2016    Influenza Virus Vaccine 11/17/2014, 11/08/2017, 10/26/2018    Influenza, High Dose (Fluzone 65 yrs and older) 10/23/2019, 09/15/2020    Pneumococcal Conjugate 13-valent ([id number]) 12/24/2018    Pneumococcal Polysaccharide ([id number]) 12/04/2019    Tdap (Boostrix, Adacel) 11/22/2011    Zoster Live (Zostavax) 08/05/2013    Zoster Recombinant (Shingrix) 01/05/2019, 03/09/2019       Allergies   Allergen Reactions    Crestor [Rosuvastatin] Other (See Comments)     Muscle cramps    Percocet [Oxycodone-Acetaminophen] Other (See Comments)     constipation     Outpatient Medications Marked as Taking for the 12/8/21 encounter (Office Visit) with Cecily Hicks MD   Medication Sig Dispense Refill    dilTIAZem (CARDIZEM CD) 180 MG extended release capsule Take 1 capsule by mouth daily 90 capsule 3    apixaban (ELIQUIS) 5 MG TABS tablet Take 1 tablet by mouth 2 times daily 180 tablet 0    losartan (COZAAR) 100 MG tablet Take 1 tablet by mouth daily 90 tablet 2    Coenzyme Q-10 200 MG CAPS       IPRATROPIUM BROMIDE NA by Nasal route      chlorpheniramine (CHLOR-TRIMETON) 4 MG tablet Take 4 mg by mouth every 6 hours as needed for Allergies      hydrocortisone (WESTCORT) 0.2 % cream Apply topically 2 times daily. 30 g 2    Multiple Vitamins-Minerals (MULTI FOR HIM 50+) TABS Take  by mouth.            Past Medical History:   Diagnosis Date    Basal cell carcinoma of right nasal sidewall 6/4/2015    BPH (benign prostatic hyperplasia) 10/18/2010    Colon, diverticulosis 12/24/2018    Hyperlipidemia 10/18/2010  Hypertension 3/22/2021    Intermittent atrial fibrillation ([address] 10/13/2020    Nephrolithiasis     s/p lithotripsy x 4, calcium oxalate.  PFO (patent foramen ovale)     Raynaud's disease 2014    Wears glasses      Past Surgical History:   Procedure Laterality Date    COLONOSCOPY      q 10,2018    COLONOSCOPY  2018    repeat q 10-diverticulosis    CYSTOSCOPY  2013     CYSTOSCOPY TRANSURETHRAL RESECTION PROSTATE    HERNIA REPAIR      KIDNEY STONE SURGERY Left 2013    perc. nephrolithotomy,stint placement    MOHS SURGERY  2015    [facility]-Dr. 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   and Christian     Social Determinants of Health     Financial Resource Strain: Low Risk     Difficulty of Paying Living Expenses: Not hard at all   Food Insecurity: No Food Insecurity    Worried About Running Out of Food in the Last Year: Never true    Tavo of Food in the Last Year: Never true   Transportation Needs:     Lack of Transportation (Medical): Not on file    Lack of Transportation (Non-Medical): Not on file   Physical Activity:     Days of Exercise per Week: Not on file    Minutes of Exercise per Session: Not on file   Stress:     Feeling of Stress : Not on file   Social Connections:     Frequency of Communication with Friends and Family: Not on file    Frequency of Social Gatherings with Friends and Family: Not on file    Attends Roman Catholic Services: Not on file    Active Member of [employer] or Organizations: Not on file    Attends Club or Organization Meetings: Not on file    Marital Status: Not on file   Intimate Partner Violence:     Fear of Current or Ex-Partner: Not on file    Emotionally Abused: Not on file    Physically Abused: Not on file    Sexually Abused: Not on file   Housing Stability:     Unable to Pay for Housing in the Last Year: Not on file    Number of [place] in the Last Year: Not on file    Unstable Housing in the Last Year: Not on file       Review of Systems:  A comprehensive review of systems was negative except for what was noted in the HPI. Physical Exam:   Vitals:    12/08/21 0925   BP: 110/60   Pulse: 90   SpO2: 98%   Weight: 211 lb 6.4 oz (95.9 kg)   Height: 5' 11\" (1.803 m)     Body mass index is 29.48 kg/m². Constitutional: He is oriented to person, place, and time. He appears well-developed and well-nourished. No distress. HEENT:   Head: Normocephalic and atraumatic. Right Ear: Tympanic membrane, external ear and ear canal normal.   Left Ear: Tympanic membrane, external ear and ear canal normal.   Nose: Nose normal.   Mouth/Throat: Oropharynx is clear and moist and mucous membranes are normal. No oropharyngeal exudate or posterior oropharyngeal erythema. He has no cervical adenopathy. Eyes: Conjunctivae and extraocular motions are normal. Pupils are equal, round, and reactive to light. Neck: Full passive range of motion without pain. Neck supple. No JVD present.  Carotid bruit is not present. No mass and no thyromegaly present. Cardiovascular: Normal rate, regular rhythm, normal heart sounds and intact distal pulses. Exam reveals no gallop and no friction rub. No murmur heard. Pulmonary/Chest: Effort normal and breath sounds normal. No respiratory distress. He has no wheezes, rhonchi or rales. Abdominal: Soft, non-tender. Bowel sounds and aorta are normal. There is no organomegaly, mass or bruit. Genitourinary:  no inguinal lymphadenopathy. Musculoskeletal: Normal range of motion, no synovitis. He exhibits no edema. Neurological: He is alert and oriented to person, place, and time. He has normal reflexes. No cranial nerve deficit. Coordination normal.   Skin: Skin is warm, dry and intact. No suspicious lesions are noted. Psychiatric: He has a normal mood and affect.  His speech is normal and behavior is normal. Judgment, cognition and memory are normal.   Orders Only on 12/03/2021   Component Date Value Ref Range Status    PSA 12/03/2021 5.90* 0.00 - 4.00 ng/mL Final    Cholesterol, Total 12/03/2021 189  0 - 199 mg/dL Final    Triglycerides 12/03/2021 122  0 - 150 mg/dL Final    HDL 12/03/2021 58  40 - 60 mg/dL Final    LDL Calculated 12/03/2021 107* <100 mg/dL Final    VLDL Cholesterol Calculated 12/03/2021 24  Not Established mg/dL Final    Sodium 12/03/2021 139  136 - 145 mmol/L Final    Potassium 12/03/2021 4.6  3.5 - 5.1 mmol/L Final    Chloride 12/03/2021 101  99 - 110 mmol/L Final    CO2 12/03/2021 23  21 - 32 mmol/L Final    Anion Gap 12/03/2021 15  3 - 16 Final    Glucose 12/03/2021 82  70 - 99 mg/dL Final    BUN 12/03/2021 21* 7 - 20 mg/dL Final    CREATININE 12/03/2021 1.2  0.8 - 1.3 mg/dL Final    GFR Non- 12/03/2021 >60  >60 Final    GFR  12/03/2021 >60  >60 Final    Calcium 12/03/2021 9.7  8.3 - 10.6 mg/dL Final    Total Protein 12/03/2021 6.5  6.4 - 8.2 g/dL Final    Albumin 12/03/2021 4.8  3.4 - 5.0 g/dL

## 2021-12-16 RX ORDER — DILTIAZEM HYDROCHLORIDE 180 MG/1
180 CAPSULE, COATED, EXTENDED RELEASE ORAL DAILY
Qty: 90 CAPSULE | Refills: 3 | Status: SHIPPED | OUTPATIENT
Start: 2021-12-16 | End: 2022-09-29 | Stop reason: SDUPTHER

## 2021-12-16 NOTE — TELEPHONE ENCOUNTER
Received refill request for Diltiazem from [address].      Last OV: 11/10/2021 w/ NPSR     Last Refill: 11/29/2021 #90 w/ 3 refills     Next Appointment: 05/11/2022 w/ MARTÍN

## 2021-12-28 ENCOUNTER — NURSE ONLY (OUTPATIENT)
Dept: CARDIOLOGY CLINIC | Age: 66
End: 2021-12-28
Payer: COMMERCIAL

## 2021-12-28 DIAGNOSIS — Z45.09 ENCOUNTER FOR LOOP RECORDER CHECK: ICD-10-CM

## 2021-12-28 DIAGNOSIS — I47.1 SVT (SUPRAVENTRICULAR TACHYCARDIA) (HCC): ICD-10-CM

## 2021-12-30 ENCOUNTER — PATIENT MESSAGE (OUTPATIENT)
Dept: CARDIOLOGY CLINIC | Age: 66
End: 2021-12-30

## 2021-12-30 PROCEDURE — G2066 INTER DEVC REMOTE 30D: HCPCS | Performed by: INTERNAL MEDICINE

## 2021-12-30 PROCEDURE — 93298 REM INTERROG DEV EVAL SCRMS: CPT | Performed by: INTERNAL MEDICINE

## 2022-01-03 RX ORDER — LOSARTAN POTASSIUM 100 MG/1
100 TABLET ORAL DAILY
Qty: 90 TABLET | Refills: 3 | Status: SHIPPED | OUTPATIENT
Start: 2022-01-03 | End: 2022-09-27

## 2022-01-03 NOTE — TELEPHONE ENCOUNTER
Refill on Losartan       Last OV:  11/10/21    Last Refill: 07/21/21    Last Labs: 12/03/21    Next OV: 05/11/22

## 2022-01-03 NOTE — TELEPHONE ENCOUNTER
From: Dk Alba  To: Dr. Tomasz Phillip  Sent: 12/30/2021 6:21 PM EST  Subject: Losartan Prescription    Hi Dr. 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 As a result, I can no longer order 90-day prescriptions through [facility]. My new long-term medication provider is [facility]. They should be reaching out to you to request a new prescription for Losartan, so I can continue taking this medication after my current supply runs out (Jan 28, 2022). Please issue a new 90-day prescription, with 3 refills.     Thanks,  Dk Alba

## 2022-01-19 RX ORDER — APIXABAN 5 MG/1
TABLET, FILM COATED ORAL
Qty: 180 TABLET | Refills: 3 | Status: SHIPPED | OUTPATIENT
Start: 2022-01-19

## 2022-02-01 ENCOUNTER — NURSE ONLY (OUTPATIENT)
Dept: CARDIOLOGY CLINIC | Age: 67
End: 2022-02-01
Payer: COMMERCIAL

## 2022-02-01 DIAGNOSIS — I47.1 SVT (SUPRAVENTRICULAR TACHYCARDIA) (HCC): ICD-10-CM

## 2022-02-01 DIAGNOSIS — Z45.09 ENCOUNTER FOR LOOP RECORDER CHECK: ICD-10-CM

## 2022-02-01 PROCEDURE — 93298 REM INTERROG DEV EVAL SCRMS: CPT | Performed by: INTERNAL MEDICINE

## 2022-02-01 PROCEDURE — G2066 INTER DEVC REMOTE 30D: HCPCS | Performed by: INTERNAL MEDICINE

## 2022-02-01 NOTE — PROGRESS NOTES
We received a remote transmission from patient's monitor at home. Remote Linq report shows no arrhythmias. Pt remains on Eliquis for known AF. EP physician to review. We will continue to monitor remotely.

## 2022-02-10 ENCOUNTER — OFFICE VISIT (OUTPATIENT)
Dept: CARDIOLOGY CLINIC | Age: 67
End: 2022-02-10
Payer: COMMERCIAL

## 2022-02-10 VITALS
HEART RATE: 82 BPM | BODY MASS INDEX: 30.18 KG/M2 | DIASTOLIC BLOOD PRESSURE: 70 MMHG | SYSTOLIC BLOOD PRESSURE: 142 MMHG | OXYGEN SATURATION: 97 % | WEIGHT: 216.4 LBS

## 2022-02-10 DIAGNOSIS — E78.5 HYPERLIPIDEMIA, UNSPECIFIED HYPERLIPIDEMIA TYPE: Primary | ICD-10-CM

## 2022-02-10 DIAGNOSIS — I10 HYPERTENSION, UNSPECIFIED TYPE: ICD-10-CM

## 2022-02-10 DIAGNOSIS — I48.0 INTERMITTENT ATRIAL FIBRILLATION (HCC): ICD-10-CM

## 2022-02-10 PROCEDURE — 4040F PNEUMOC VAC/ADMIN/RCVD: CPT | Performed by: INTERNAL MEDICINE

## 2022-02-10 PROCEDURE — G8427 DOCREV CUR MEDS BY ELIG CLIN: HCPCS | Performed by: INTERNAL MEDICINE

## 2022-02-10 PROCEDURE — 3017F COLORECTAL CA SCREEN DOC REV: CPT | Performed by: INTERNAL MEDICINE

## 2022-02-10 PROCEDURE — 99214 OFFICE O/P EST MOD 30 MIN: CPT | Performed by: INTERNAL MEDICINE

## 2022-02-10 PROCEDURE — 1123F ACP DISCUSS/DSCN MKR DOCD: CPT | Performed by: INTERNAL MEDICINE

## 2022-02-10 PROCEDURE — 1036F TOBACCO NON-USER: CPT | Performed by: INTERNAL MEDICINE

## 2022-02-10 PROCEDURE — G8484 FLU IMMUNIZE NO ADMIN: HCPCS | Performed by: INTERNAL MEDICINE

## 2022-02-10 PROCEDURE — G8417 CALC BMI ABV UP PARAM F/U: HCPCS | Performed by: INTERNAL MEDICINE

## 2022-02-10 RX ORDER — LANOLIN ALCOHOL/MO/W.PET/CERES
500 CREAM (GRAM) TOPICAL NIGHTLY
COMMUNITY
End: 2022-05-11 | Stop reason: ALTCHOICE

## 2022-02-10 RX ORDER — EZETIMIBE 10 MG/1
10 TABLET ORAL DAILY
Qty: 30 TABLET | Refills: 3 | Status: SHIPPED | OUTPATIENT
Start: 2022-02-10 | End: 2022-02-22 | Stop reason: SDUPTHER

## 2022-02-10 ASSESSMENT — ENCOUNTER SYMPTOMS
BLOOD IN STOOL: 0
COLOR CHANGE: 0
CHEST TIGHTNESS: 0
ABDOMINAL DISTENTION: 0
FACIAL SWELLING: 0
COUGH: 0
BACK PAIN: 0
ABDOMINAL PAIN: 0
SHORTNESS OF BREATH: 0
EYE DISCHARGE: 0
VOMITING: 0
WHEEZING: 0

## 2022-02-10 NOTE — ASSESSMENT & PLAN NOTE
Well-controlled on statins  Has tried multiple statins but developed myalgias. Start Zetia.   If LDL not controlled we will discuss starting twice a year injectables Neftali Laurent)

## 2022-02-10 NOTE — PROGRESS NOTES
[facility]     Outpatient Cardiology         Chief Complaint   Patient presents with    Other     hx: PFO, SVT    Discuss Medications     leg cramps with Crestor and multivitamins, has tried multiple statins in the past    Other     PCP suggested Zetia and patient purchased Niacin        HPI     Marroquin Flavors a 74 y.o. male here for follow up for PFO, PAF. Former Dr. Raymundo Carrizales patient. Hx of HLD, HTN, TIA, PVC, PAC. Follows Dr. Bernadine Bañuelos for ILR. Asymptomatic from a cardiovascular perspective. Currently taking diltiazem and Eliquis for A. fib, well controlled. Hyperlipidemia, unable to tolerate statins due to myalgias, LDL at goal now on the statins. Today we discussed trying Zetia. Hypertension, overall decently controlled at home, so elevated today, advised him to continue checking      PMH  Past Medical History:   Diagnosis Date    Basal cell carcinoma of right nasal sidewall 6/4/2015    BPH (benign prostatic hyperplasia) 10/18/2010    Colon, diverticulosis 12/24/2018    Hyperlipidemia 10/18/2010    Hypertension 3/22/2021    Intermittent atrial fibrillation ([address] 10/13/2020    Nephrolithiasis     s/p lithotripsy x 4, calcium oxalate.      PFO (patent foramen ovale)     Raynaud's disease 12/4/2014    Wears glasses        PSH  Past Surgical History:   Procedure Laterality Date    COLONOSCOPY  2007    q 10,5/2018    COLONOSCOPY  2018    repeat q 10-diverticulosis    CYSTOSCOPY  11/14/2013     CYSTOSCOPY TRANSURETHRAL RESECTION PROSTATE    HERNIA REPAIR      KIDNEY STONE SURGERY Left 08/29/2013    perc. nephrolithotomy,stint placement    MOHS SURGERY  06/2015    [facility]-Dr. Horton [place] rt nasal sidewall    OTHER SURGICAL HISTORY      perirectal abscess 1992    PROSTATE SURGERY  04/2005    biopsy        Social HIstory  Social History     Tobacco Use    Smoking status: Never Smoker    Smokeless tobacco: Never Used   Vaping Use    Vaping Use: Not on file   Substance Use Topics    Alcohol use: No    Drug use: No       Family History  Family History   Problem Relation Age of Onset    Hypertension Father     Heart Disease Father         valvular heart disease    Cancer Mother         lymphoma  80       Allergies   Allergies   Allergen Reactions    Crestor [Rosuvastatin] Other (See Comments)     Muscle cramps    Percocet [Oxycodone-Acetaminophen] Other (See Comments)     constipation       Medications:     Home Medications:  Were reviewed and are listed in nursing record. and/or listed below    Prior to Admission medications    Medication Sig Start Date End Date Taking? Authorizing Provider   niacin 500 MG extended release capsule Take 500 mg by mouth nightly   Yes Historical Provider, MD   ezetimibe (ZETIA) 10 MG tablet Take 1 tablet by mouth daily 2/10/22  Yes Pippa Marquis MD   ELIQUIS 5 MG TABS tablet TAKE 1 TABLET BY MOUTH  TWICE DAILY 22  Yes YVETTE Patel CNP   losartan (COZAAR) 100 MG tablet Take 1 tablet by mouth daily 1/3/22  Yes YVETTE Patel CNP   dilTIAZem (CARDIZEM CD) 180 MG extended release capsule TAKE 1 CAPSULE BY MOUTH DAILY 21  Yes Paola Nichols MD   Coenzyme Q-10 200 MG CAPS  17  Yes Historical Provider, MD   IPRATROPIUM BROMIDE NA by Nasal route   Yes Historical Provider, MD   chlorpheniramine (CHLOR-TRIMETON) 4 MG tablet Take 4 mg by mouth every 6 hours as needed for Allergies   Yes Historical Provider, MD   hydrocortisone (WESTCORT) 0.2 % cream Apply topically 2 times daily. 17  Yes Renetta Arenas MD   Multiple Vitamins-Minerals [facility]+) TABS Take  by mouth. Yes Historical Provider, MD        Review of Systems   Constitutional: Negative for activity change, appetite change, diaphoresis, fatigue, fever and unexpected weight change. HENT: Negative for congestion, facial swelling, mouth sores and nosebleeds. Eyes: Negative for discharge and visual disturbance.    Respiratory: Negative for cough, chest tightness, shortness of breath and wheezing. Cardiovascular: Negative for chest pain, palpitations and leg swelling. Gastrointestinal: Negative for abdominal distention, abdominal pain, blood in stool and vomiting. Endocrine: Negative for cold intolerance, heat intolerance and polyuria. Genitourinary: Negative for difficulty urinating, dysuria, frequency and hematuria. Musculoskeletal: Negative for back pain, joint swelling, myalgias and neck pain. Skin: Negative for color change, pallor and rash. Allergic/Immunologic: Negative for immunocompromised state. Neurological: Negative for dizziness, syncope, weakness, light-headedness, numbness and headaches. Hematological: Negative for adenopathy. Does not bruise/bleed easily. Psychiatric/Behavioral: Negative for behavioral problems, confusion, decreased concentration and suicidal ideas. The patient is not nervous/anxious. Vitals:    02/10/22 1454   BP: (!) 142/70   Pulse:    SpO2:     Weight: 216 lb 6.4 oz (98.2 kg)       Vitals:    02/10/22 1445 02/10/22 1454   BP: (!) 150/92 (!) 142/70   Site: Left Upper Arm    Position: Sitting    Cuff Size: Large Adult    Pulse: 82    SpO2: 97%    Weight: 216 lb 6.4 oz (98.2 kg)        BP Readings from Last 3 Encounters:   02/10/22 (!) 142/70   12/08/21 110/60   11/10/21 112/70       Wt Readings from Last 3 Encounters:   02/10/22 216 lb 6.4 oz (98.2 kg)   12/08/21 211 lb 6.4 oz (95.9 kg)   11/10/21 202 lb 9.6 oz (91.9 kg)       Physical Exam  Constitutional:       General: He is not in acute distress. Appearance: He is well-developed. He is not diaphoretic. HENT:      Head: Normocephalic and atraumatic. Eyes:      Pupils: Pupils are equal, round, and reactive to light. Neck:      Thyroid: No thyromegaly. Vascular: No JVD. Cardiovascular:      Rate and Rhythm: Normal rate and regular rhythm. Chest Wall: PMI is not displaced.       Heart sounds: Normal heart sounds, S1 normal and S2 normal. No murmur heard. No friction rub. No gallop. Pulmonary:      Effort: Pulmonary effort is normal. No respiratory distress. Breath sounds: Normal breath sounds. No stridor. No wheezing or rales. Chest:      Chest wall: No tenderness. Abdominal:      General: Bowel sounds are normal. There is no distension. Palpations: Abdomen is soft. Tenderness: There is no abdominal tenderness. There is no guarding or rebound. Musculoskeletal:         General: No tenderness. Normal range of motion. Cervical back: Normal range of motion. Lymphadenopathy:      Cervical: No cervical adenopathy. Skin:     General: Skin is warm and dry. Findings: No erythema or rash. Neurological:      Mental Status: He is alert and oriented to person, place, and time. Coordination: Coordination normal.   Psychiatric:         Behavior: Behavior normal.         Thought Content:  Thought content normal.         Judgment: Judgment normal.         Labs:       Lab Results   Component Value Date    WBC 5.2 12/03/2021    HGB 15.7 12/03/2021    HCT 49.3 12/03/2021    MCV 91.7 12/03/2021     12/03/2021     Lab Results   Component Value Date     12/03/2021    K 4.6 12/03/2021     12/03/2021    CO2 23 12/03/2021    BUN 21 (H) 12/03/2021    CREATININE 1.2 12/03/2021    GLUCOSE 82 12/03/2021    CALCIUM 9.7 12/03/2021    PROT 6.5 12/03/2021    LABALBU 4.8 12/03/2021    BILITOT 0.3 12/03/2021    ALKPHOS 112 12/03/2021    AST 27 12/03/2021    ALT 27 12/03/2021    LABGLOM >60 12/03/2021    GFRAA >60 12/03/2021    AGRATIO 2.8 (H) 12/03/2021    GLOB 1.4 12/03/2020         Lab Results   Component Value Date    CHOL 189 12/03/2021    CHOL 162 12/03/2020    CHOL 154 11/29/2019     Lab Results   Component Value Date    TRIG 122 12/03/2021    TRIG 112 12/03/2020    TRIG 85 11/29/2019     Lab Results   Component Value Date    HDL 58 12/03/2021    HDL 55 12/03/2020    HDL 63 (H) 11/29/2019     Lab Results Component Value Date    LDLCALC 107 (H) 12/03/2021    LDLCALC 85 12/03/2020    LDLCALC 74 11/29/2019     Lab Results   Component Value Date    LABVLDL 24 12/03/2021    LABVLDL 22 12/03/2020    LABVLDL 17 11/29/2019     No results found for: [facility]    Lab Results   Component Value Date    INR 2.53 10/18/2019    INR 0.94 08/29/2013    INR 0.88 (L) 08/19/2013    PROTIME 25.4 10/18/2019    PROTIME 12.4 08/29/2013    PROTIME 11.8 08/19/2013       The 10-year ASCVD risk score (Lb Love, et al., 2013) is: 19.5%    Values used to calculate the score:      Age: 74 years      Sex: Male      Is Non- : No      Diabetic: No      Tobacco smoker: No      Systolic Blood Pressure: 678 mmHg      Is BP treated: Yes      HDL Cholesterol: 58 mg/dL      Total Cholesterol: 189 mg/dL      Imaging:       Last ECG (if available, Personally interpreted):  Sinus bradycardia, first-degree block    Last Monitor/Holter  [facility] Holter report: PAC, PVC, 3 episodes of SVT     Last Stress (if available):    Last Cath (if available):    Last TTE/GEREMIAS(if available): 7/1/19  Summary    There is evidence of right-to-left intracardiac shunting/patent foramen    ovale by agitated saline bubble contrast study.    Normal left ventricular size, wall thickness & systolic function.    Normal left ventricular inflow profile.    There are no significant segmental wall motion abnormalities.    The left ventricular ejection fraction is calculated at 75 %. Last CMR  (if available):      Assessment / Plan:     Hyperlipidemia  Well-controlled on statins  Has tried multiple statins but developed myalgias. Start Zetia. If LDL not controlled we will discuss starting twice a year injectables (Leqvio)    Hypertension  Slightly high today although usually well controlled. We will continue current dose of losartan and diltiazem  Increase diltiazem to 240 if needed    Intermittent atrial fibrillation (HCC)  Controlled.   No issues with Eliquis. Continue diltiazem and losartan      Follow up in 6 months with lipids prior to appointment    I had the opportunity to review the clinical symptoms and presentation of Dk Alba. Patient's allergies and medications were reviewed and updated. Patient's past medical, surgical, social and family history were reviewed and updated. Patient's testing including laboratory, ECGs, monitor, imaging (TTE,GEREMIAS,CMR,cath) were reviewed. Tobacco use was discussed with the patient and educated on the negative effects. I have asked the patient to not utilize these agents. All questions and concerns were addressed to the patient/family. Alternatives to my treatment were discussed. The note was completed using EMR. Every effort wasmade to ensure accuracy; however, inadvertent computerized transcription errors may be present. Thank you for allowing me to participate in thecare or Dk Beach MD, [facility]

## 2022-02-22 RX ORDER — EZETIMIBE 10 MG/1
10 TABLET ORAL DAILY
Qty: 90 TABLET | Refills: 3 | Status: SHIPPED | OUTPATIENT
Start: 2022-02-22 | End: 2022-03-07

## 2022-03-07 RX ORDER — EZETIMIBE 10 MG/1
TABLET ORAL
Qty: 90 TABLET | Refills: 3 | Status: SHIPPED | OUTPATIENT
Start: 2022-03-07 | End: 2022-09-27 | Stop reason: SDUPTHER

## 2022-03-07 NOTE — TELEPHONE ENCOUNTER
Requested Prescriptions     Pending Prescriptions Disp Refills    ezetimibe (Oddis Katie) 10 MG tablet [Pharmacy Med Name: EZETIMIBE 10 MG TABLET] 90 tablet 1     Sig: TAKE 1 TABLET BY MOUTH EVERY DAY            Last Office Visit: 2/10/2022     Next Office Visit: 8/4/2022

## 2022-03-08 ENCOUNTER — NURSE ONLY (OUTPATIENT)
Dept: CARDIOLOGY CLINIC | Age: 67
End: 2022-03-08
Payer: COMMERCIAL

## 2022-03-08 DIAGNOSIS — Z45.09 ENCOUNTER FOR LOOP RECORDER CHECK: ICD-10-CM

## 2022-03-08 DIAGNOSIS — I47.1 SVT (SUPRAVENTRICULAR TACHYCARDIA) (HCC): ICD-10-CM

## 2022-03-09 PROCEDURE — 93298 REM INTERROG DEV EVAL SCRMS: CPT | Performed by: INTERNAL MEDICINE

## 2022-03-09 PROCEDURE — G2066 INTER DEVC REMOTE 30D: HCPCS | Performed by: INTERNAL MEDICINE

## 2022-03-18 ENCOUNTER — HOSPITAL ENCOUNTER (OUTPATIENT)
Dept: GENERAL RADIOLOGY | Age: 67
Discharge: HOME | End: 2022-03-18
Payer: COMMERCIAL

## 2022-03-18 DIAGNOSIS — N20.0 CALCULUS OF KIDNEY: ICD-10-CM

## 2022-03-18 DIAGNOSIS — M54.9 BACK PAIN, UNSPECIFIED BACK LOCATION, UNSPECIFIED BACK PAIN LATERALITY, UNSPECIFIED CHRONICITY: ICD-10-CM

## 2022-03-18 PROCEDURE — 74018 RADEX ABDOMEN 1 VIEW: CPT

## 2022-04-08 ENCOUNTER — NURSE ONLY (OUTPATIENT)
Dept: CARDIOLOGY CLINIC | Age: 67
End: 2022-04-08
Payer: COMMERCIAL

## 2022-04-08 DIAGNOSIS — I47.1 SVT (SUPRAVENTRICULAR TACHYCARDIA) (HCC): ICD-10-CM

## 2022-04-08 DIAGNOSIS — Z45.09 ENCOUNTER FOR LOOP RECORDER CHECK: ICD-10-CM

## 2022-04-10 PROCEDURE — G2066 INTER DEVC REMOTE 30D: HCPCS | Performed by: INTERNAL MEDICINE

## 2022-04-10 PROCEDURE — 93298 REM INTERROG DEV EVAL SCRMS: CPT | Performed by: INTERNAL MEDICINE

## 2022-05-10 NOTE — PROGRESS NOTES
[address]   Electrophysiology Follow up   Date: 5/11/2022  I had the privilege of visiting Dk Alba in the office. CC: Atrial fibrillation  HPI: Dk Alba is a 74 y.o. male with a PMH of HLD and asthma. He was initially to discuss an ILR implantation at the request of Dr. Shayy Friend. He was seen at [address] [place] [address] with concerns for a TIA . He was found to have a PFO and has had 2 episodes of TIA in the past.     Also reports 2 episodes of what he describes as irregular heart beat with rate from 120-60 for almost an hour 6 and 12 months ago.     Both his event monitor and Holters have shown short atrial runs      Interval History:   Brandon Foster presents today in follow up. He complains of leg cramps and dull pain in his legs when he is sitting or lying. Denies pain when walking. He is compliant with his eliquis. What he describes is not likley claudication pain. Referred to his PCP. Assessment and plan:   Paroxysmal Atrial fibrillation, symptomatic   EKG today shows Sinus Rhythm with 1st degree block,  PVC is artifact    [place] on interrogation is 0.4%- Last on 04/23/2022 , longest is 5.5 hours, fastest median V rate 136 bpm.    Continue Cardizem 180 mg daily   Patient has a [id number] Score of 4  (age, HTN,TIA )    ~ continue Eliquis 5 mg BID- no s/s bleeding      ~ creatinine 1.2 12/03/2021    TSH 2.54 03/27/2019     Afib risk factors including age, HTN, obesity, inactivity and ORQUIDEA were discussed with patient. Risk factor modification recommended     Has not been evaluated for Sleep apnea since 1990 - was referred to sleep medicine 12/2021 . Encouraged to follow up with sleep apnea. Contact information provided. Ablation was been discussed at prior visits. Will consider if burden worsens. Medical options are limited due to baseline bradycardia. He would like to continue with medical treatment at this time. Work on risk factors .  Discussed in detail about the risk factors, pathophysiology, and treatment options including life style modifications for atrial fibrillation. · Eat heart healthy diet  · Minimize alcohol intake  · Minimize caffeine and soda   · Keep your blood pressure under control. · Keep your blood sugar under control. · Stop smoking  · Be active, keep your body weight optimal  · Exercise on a regular basis  · Manage your stress   · If you snore, get evaluated for sleep apnea  · Be aware of the symptoms of stroke       His burden has remained the same. Plan will be to continue monitor him and consider ablation when he becomes symptomatic or burden increases significantly. Meanwhile he will follow-up with  sleep medicine. Maintaining healthy weight and exercise are recommended. HTN  -Controlled 131/81   -BP goal <130/80  -Home BP monitoring encouraged, printed information provided on how to accurately measure BP at home.  -Counseled to follow a low salt diet to assure blood pressure remains controlled for cardiovascular risk factor modification.   -The patient is counseled to get regular exercise 3-5 times per week and maintain a healthy weight reduce cardiovascular risk factors. - continue Cardizem, Cozaar      SVT/PAC/PVC    - 1 tachy episode noted on 02/04/2022 lasting 6 seconds. Max V rate 171 bpm    Seen on a 48 hour monitor 06/19 and loop recorder. Implanted loop recorder. 10/22/2019    The CIED was interrogated and programmed and I supervised and reviewed all the data. All findings and changes are in device interrogation sheet and reflect my personal interpretation and changes and is scanned to Epic. 0.4% atrial fibrillation, last 04/23/2022, longest 5.5 hours. , fastest median V rate 136 bpm. 1 tachy episode noted on 02/04/2022 lasting 6 seconds. Max V rate 171 bpm.     If his linq reaches BHAVESH I would prefer to replace.           Transient Ischemic Attack  03/30/2019     -Cryptogenic    -Described symptoms suggestive of Atrial fibrillation   -S/p ILR 10/22/19   - continue zetia, eliquis       Patent Foramen Ovale    - followed by Dr Gerri Etienne     Hyperlipidemia    - unable to tolerate statin due to myalgia    - Continue Zetia    - follows with    Plan:   Work on risk factor modificaiton  Follow up in 6-8 months with NPSR  No medication changes  See PCP about leg pain     Patient Active Problem List    Diagnosis Date Noted    Nephrolithiasis     At risk for sleep apnea 11/10/2021    Lipoma of left upper extremity 08/13/2021    Hypertension 03/22/2021    SVT (supraventricular tachycardia) ([address] 03/22/2021    PAC (premature atrial contraction) 03/22/2021    PVC (premature ventricular contraction) 03/22/2021    Status post placement of implantable loop recorder 03/22/2021    Intermittent atrial fibrillation ([address] 10/13/2020    PSA elevation 12/04/2019    Ganglion cyst of foot 12/04/2019    Encounter for loop recorder check 10/22/2019    PFO (patent foramen ovale) 07/16/2019    Colon, diverticulosis 12/24/2018    Atrophy of right kidney 02/16/2017    Chronic throat clearing 11/22/2016    History of basal cell cancer 06/04/2015    RBBB 12/04/2014    Raynaud's disease 12/04/2014    Rhinitis due food 12/04/2014    Hyperlipidemia 10/18/2010    BPH with urinary obstruction 10/18/2010     Diagnostic studies:   ECG 5/11/22  Sinus Rhythm with 1st degree block  468 QTcH,  144 QRS    Echo: 7/1/19  Summary   There is evidence of right-to-left intracardiac shunting/patent foramen   ovale by agitated saline bubble contrast study. Normal left ventricular size, wall thickness & systolic function. Normal left ventricular inflow profile. There are no significant segmental wall motion abnormalities. The left ventricular ejection fraction is calculated at 75 %. I independently reviewed the cardiac diagnostic studies, ECG and relevant imaging studies.      No results found for: LVEF, LVEFMODE  Lab Results   Component Value Date TSH 2.54 03/27/2019         Physical Examination:  Vitals:    05/11/22 1611   BP: 131/81   Pulse: 59   SpO2: 97%      Wt Readings from Last 3 Encounters:   05/11/22 216 lb 8 oz (98.2 kg)   02/10/22 216 lb 6.4 oz (98.2 kg)   12/08/21 211 lb 6.4 oz (95.9 kg)       · Constitutional: Oriented. No distress. · Head: Normocephalic and atraumatic. · Mouth/Throat: Oropharynx is clear and moist.   · Eyes: Conjunctivae normal. EOM are normal.   · Neck: Neck supple. No rigidity. No JVD present. · Cardiovascular: Normal rate, regular rhythm, S1&S2. · Pulmonary/Chest: Bilateral respiratory sounds. No wheezes, No rhonchi. · Abdominal: Soft. Bowel sounds present. No distension, No tenderness. · Musculoskeletal: No tenderness. No edema    · Lymphadenopathy: Has no cervical adenopathy. · Neurological: Alert and oriented. Cranial nerve appears intact, No Gross deficit   · Skin: Skin is warm and dry. No rash noted. · Psychiatric: Has a normal behavior       Review of System:  [x] Full ROS obtained and negative except as mentioned in HPI    Prior to Admission medications    Medication Sig Start Date End Date Taking? Authorizing Provider   ezetimibe (ZETIA) 10 MG tablet TAKE 1 TABLET BY MOUTH EVERY DAY 3/7/22  Yes Harsh Becerra MD   ELIQUIS 5 MG TABS tablet TAKE 1 TABLET BY MOUTH  TWICE DAILY 1/19/22  Yes YVETTE You CNP   losartan (COZAAR) 100 MG tablet Take 1 tablet by mouth daily 1/3/22  Yes YVETTE You - JAMEY   dilTIAZem (CARDIZEM CD) 180 MG extended release capsule TAKE 1 CAPSULE BY MOUTH DAILY 12/16/21  Yes Ely Jensen MD   Coenzyme Q-10 200 MG CAPS  12/1/17  Yes Historical Provider, MD   IPRATROPIUM BROMIDE NA by Nasal route   Yes Historical Provider, MD   chlorpheniramine (CHLOR-TRIMETON) 4 MG tablet Take 4 mg by mouth every 6 hours as needed for Allergies   Yes Historical Provider, MD   hydrocortisone (WESTCORT) 0.2 % cream Apply topically 2 times daily.  2/16/17  Yes Lalit Fermin Kimberley Claire MD   Multiple Vitamins-Minerals [facility] FOR [facility] 50+) TABS Take  by mouth. Yes Historical Provider, MD       Allergies   Allergen Reactions    Crestor [Rosuvastatin] Other (See Comments)     Muscle cramps    Percocet [Oxycodone-Acetaminophen] Other (See Comments)     constipation       Social History:  Reviewed. reports that he has never smoked. He has never used smokeless tobacco. He reports that he does not drink alcohol and does not use drugs. Family History:  Reviewed. Reviewed. No family history of SCD. Relevant and available labs, and cardiovascular diagnostics reviewed. Reviewed. I independently reviewed relevant and available cardiac diagnostic tests ECG, CXR, Echo, Stress test, Device interrogation, Holter, CT scan. Outside medical records via Care everywhere reviewed and summarized in H&P above. Complex medical condition with multiple medical problems affecting prognosis and outcome of EP interventions       - The patient is counseled to follow a low salt diet to assure blood pressure remains controlled for cardiovascular risk factor modification.   - The patient is counseled to avoid excess caffeine, and energy drinks as this may exacerbated ectopy and arrhythmia. - The patient is counseled to get regular exercise 3-5 times per week to control cardiovascular risk factors. - The patient is counseled to lose weigt to control cardiovascular risk factors. All questions and concerns were addressed to the patient/family. Alternatives to my treatment were discussed. I have discussed the above stated plan and the patient verbalized understanding and agreed with the plan. Scribe attestation:  This note was scribed in the presence of  Lucian Spring MD by Gatito Coronel RN  I, Dr. Lucian Spring personally performed the services described in this documentation as scribed by RN in my presence, and it is both accurate and complete.           NOTE: This report was transcribed using voice recognition software. Every effort was made to ensure accuracy, however, inadvertent computerized transcription errors may be present.      Alycia Riggs MD, Margar[facility] Vazquez [facility]   Office: [phone]  Fax: [phone]

## 2022-05-11 ENCOUNTER — NURSE ONLY (OUTPATIENT)
Dept: CARDIOLOGY CLINIC | Age: 67
End: 2022-05-11

## 2022-05-11 ENCOUNTER — OFFICE VISIT (OUTPATIENT)
Dept: CARDIOLOGY CLINIC | Age: 67
End: 2022-05-11
Payer: COMMERCIAL

## 2022-05-11 VITALS
DIASTOLIC BLOOD PRESSURE: 81 MMHG | BODY MASS INDEX: 30.31 KG/M2 | SYSTOLIC BLOOD PRESSURE: 131 MMHG | OXYGEN SATURATION: 97 % | WEIGHT: 216.5 LBS | HEART RATE: 59 BPM | HEIGHT: 71 IN

## 2022-05-11 DIAGNOSIS — I49.3 PVC (PREMATURE VENTRICULAR CONTRACTION): ICD-10-CM

## 2022-05-11 DIAGNOSIS — I47.1 SVT (SUPRAVENTRICULAR TACHYCARDIA) (HCC): ICD-10-CM

## 2022-05-11 DIAGNOSIS — I49.1 PAC (PREMATURE ATRIAL CONTRACTION): ICD-10-CM

## 2022-05-11 DIAGNOSIS — I10 HYPERTENSION, UNSPECIFIED TYPE: ICD-10-CM

## 2022-05-11 DIAGNOSIS — E78.5 HYPERLIPIDEMIA, UNSPECIFIED HYPERLIPIDEMIA TYPE: ICD-10-CM

## 2022-05-11 DIAGNOSIS — I48.0 INTERMITTENT ATRIAL FIBRILLATION (HCC): Primary | ICD-10-CM

## 2022-05-11 DIAGNOSIS — Z45.09 ENCOUNTER FOR LOOP RECORDER CHECK: ICD-10-CM

## 2022-05-11 DIAGNOSIS — Q21.12 PFO (PATENT FORAMEN OVALE): ICD-10-CM

## 2022-05-11 PROCEDURE — 99214 OFFICE O/P EST MOD 30 MIN: CPT | Performed by: INTERNAL MEDICINE

## 2022-05-11 PROCEDURE — G8417 CALC BMI ABV UP PARAM F/U: HCPCS | Performed by: INTERNAL MEDICINE

## 2022-05-11 PROCEDURE — 1036F TOBACCO NON-USER: CPT | Performed by: INTERNAL MEDICINE

## 2022-05-11 PROCEDURE — 1123F ACP DISCUSS/DSCN MKR DOCD: CPT | Performed by: INTERNAL MEDICINE

## 2022-05-11 PROCEDURE — 4040F PNEUMOC VAC/ADMIN/RCVD: CPT | Performed by: INTERNAL MEDICINE

## 2022-05-11 PROCEDURE — G8427 DOCREV CUR MEDS BY ELIG CLIN: HCPCS | Performed by: INTERNAL MEDICINE

## 2022-05-11 PROCEDURE — 3017F COLORECTAL CA SCREEN DOC REV: CPT | Performed by: INTERNAL MEDICINE

## 2022-05-11 NOTE — PROGRESS NOTES
Patient comes in for interrogation of their implanted loop recorder. Interrogation shows 6 AF episodes noted with a 0.4%. 1 Tachy episode noted on 2/4/2022 lasting 6 seconds. Implanted for AF management/TIA. Patient remains on Eliquis and Cardizem. Please see interrogation for more detail. Patient will see Dr. Sheree Schilder today and we will continue to follow the Patient remotely.

## 2022-05-17 ENCOUNTER — NURSE ONLY (OUTPATIENT)
Dept: CARDIOLOGY CLINIC | Age: 67
End: 2022-05-17
Payer: COMMERCIAL

## 2022-05-17 DIAGNOSIS — Z45.09 ENCOUNTER FOR LOOP RECORDER CHECK: ICD-10-CM

## 2022-05-17 DIAGNOSIS — I47.1 SVT (SUPRAVENTRICULAR TACHYCARDIA) (HCC): ICD-10-CM

## 2022-05-17 PROCEDURE — 93298 REM INTERROG DEV EVAL SCRMS: CPT | Performed by: INTERNAL MEDICINE

## 2022-05-17 PROCEDURE — G2066 INTER DEVC REMOTE 30D: HCPCS | Performed by: INTERNAL MEDICINE

## 2022-06-21 ENCOUNTER — NURSE ONLY (OUTPATIENT)
Dept: CARDIOLOGY CLINIC | Age: 67
End: 2022-06-21
Payer: COMMERCIAL

## 2022-06-21 DIAGNOSIS — Z45.09 ENCOUNTER FOR LOOP RECORDER CHECK: ICD-10-CM

## 2022-06-21 DIAGNOSIS — I47.1 SVT (SUPRAVENTRICULAR TACHYCARDIA) (HCC): ICD-10-CM

## 2022-06-21 PROCEDURE — 93298 REM INTERROG DEV EVAL SCRMS: CPT | Performed by: INTERNAL MEDICINE

## 2022-06-21 PROCEDURE — G2066 INTER DEVC REMOTE 30D: HCPCS | Performed by: INTERNAL MEDICINE

## 2022-06-21 NOTE — PROGRESS NOTES
We received a remote transmission from patient's monitor at home. Remote Linq report shows AF. Pt remains on Eliquis. EP physician to review. We will continue to monitor remotely. End of 31-day monitoring period 6-21-22. [facility]

## 2022-07-26 ENCOUNTER — NURSE ONLY (OUTPATIENT)
Dept: CARDIOLOGY CLINIC | Age: 67
End: 2022-07-26
Payer: COMMERCIAL

## 2022-07-26 DIAGNOSIS — I47.1 SVT (SUPRAVENTRICULAR TACHYCARDIA) (HCC): Primary | ICD-10-CM

## 2022-07-26 DIAGNOSIS — Z45.09 ENCOUNTER FOR LOOP RECORDER CHECK: ICD-10-CM

## 2022-07-26 PROCEDURE — 93298 REM INTERROG DEV EVAL SCRMS: CPT | Performed by: INTERNAL MEDICINE

## 2022-07-26 PROCEDURE — G2066 INTER DEVC REMOTE 30D: HCPCS | Performed by: INTERNAL MEDICINE

## 2022-08-01 DIAGNOSIS — E78.5 HYPERLIPIDEMIA, UNSPECIFIED HYPERLIPIDEMIA TYPE: ICD-10-CM

## 2022-08-01 LAB
CHOLESTEROL, TOTAL: 177 MG/DL (ref 0–199)
HDLC SERPL-MCNC: 51 MG/DL (ref 40–60)
LDL CHOLESTEROL CALCULATED: 97 MG/DL
TRIGL SERPL-MCNC: 144 MG/DL (ref 0–150)
VLDLC SERPL CALC-MCNC: 29 MG/DL

## 2022-08-01 ASSESSMENT — ENCOUNTER SYMPTOMS
BLOOD IN STOOL: 0
VOMITING: 0
ABDOMINAL DISTENTION: 0
FACIAL SWELLING: 0
SHORTNESS OF BREATH: 0
COLOR CHANGE: 0
ABDOMINAL PAIN: 0
CHEST TIGHTNESS: 0
COUGH: 0
BACK PAIN: 0
EYE DISCHARGE: 0
WHEEZING: 0

## 2022-08-01 NOTE — PROGRESS NOTES
[facility]     Outpatient Cardiology         Chief Complaint   Patient presents with    Hyperlipidemia     Has current lipid panel results       HPI     Analy Zimmer a 69 y.o. male here for follow up for PFO, PAF. Hx of HLD, HTN, TIA, PVC, PAC. Follows Dr. Robin Amador for ILR. Currently taking diltiazem and Eliquis for A. fib, well controlled. Hyperlipidemia, unable to tolerate statins due to myalgias, LDL is better now on ezetimibe although still having some myalgias. Today we discussed getting a coronary calcium score for further screening and to better determine LDL goal   Hypertension, controlled on current medications      PMH  Past Medical History:   Diagnosis Date    Basal cell carcinoma of right nasal sidewall 2015    BPH (benign prostatic hyperplasia) 10/18/2010    Colon, diverticulosis 2018    Hyperlipidemia 10/18/2010    Hypertension 3/22/2021    Intermittent atrial fibrillation ([facility] 10/13/2020    Nephrolithiasis     s/p lithotripsy x 4, calcium oxalate.      PFO (patent foramen ovale)     Raynaud's disease 2014    Wears glasses        PSH  Past Surgical History:   Procedure Laterality Date    COLONOSCOPY      q 10,2018    COLONOSCOPY  2018    repeat q 10-diverticulosis    CYSTOSCOPY  2013     CYSTOSCOPY TRANSURETHRAL RESECTION PROSTATE    HERNIA REPAIR      KIDNEY STONE SURGERY Left 2013    perc. nephrolithotomy,stint placement    MOHS SURGERY  2015    [facility]-Dr. Val Canada rt nasal sidewall    OTHER SURGICAL HISTORY      perirectal abscess     PROSTATE SURGERY  2005    biopsy        Social HIstory  Social History     Tobacco Use    Smoking status: Never    Smokeless tobacco: Never   Substance Use Topics    Alcohol use: No    Drug use: No       Family History  Family History   Problem Relation Age of Onset    Hypertension Father     Heart Disease Father         valvular heart disease    Cancer Mother         lymphoma  78 Allergies   Allergies   Allergen Reactions    Crestor [Rosuvastatin] Other (See Comments)     Muscle cramps    Percocet [Oxycodone-Acetaminophen] Other (See Comments)     constipation       Medications:     Home Medications:  Were reviewed and are listed in nursing record. and/or listed below    Prior to Admission medications    Medication Sig Start Date End Date Taking? Authorizing Provider   ezetimibe (ZETIA) 10 MG tablet TAKE 1 TABLET BY MOUTH EVERY DAY 3/7/22  Yes Te Van MD   ELIQUIS 5 MG TABS tablet TAKE 1 TABLET BY MOUTH  TWICE DAILY 1/19/22  Yes YVETTE Montoya CNP   losartan (COZAAR) 100 MG tablet Take 1 tablet by mouth daily 1/3/22  Yes YVETTE Montoya CNP   dilTIAZem (CARDIZEM CD) 180 MG extended release capsule TAKE 1 CAPSULE BY MOUTH DAILY 12/16/21  Yes Yadira Fuller MD   Coenzyme Q-10 200 MG CAPS  12/1/17  Yes Historical Provider, MD   IPRATROPIUM BROMIDE NA by Nasal route   Yes Historical Provider, MD   chlorpheniramine (CHLOR-TRIMETON) 4 MG tablet Take 4 mg by mouth every 6 hours as needed for Allergies   Yes Historical Provider, MD   hydrocortisone (WESTCORT) 0.2 % cream Apply topically 2 times daily. 2/16/17  Yes Raquel Perera MD   Multiple Vitamins-Minerals [facility] FOR [facility] 50+) TABS Take  by mouth. Yes Historical Provider, MD        Review of Systems   Constitutional:  Negative for activity change, appetite change, diaphoresis, fatigue, fever and unexpected weight change. HENT:  Negative for congestion, facial swelling, mouth sores and nosebleeds. Eyes:  Negative for discharge and visual disturbance. Respiratory:  Negative for cough, chest tightness, shortness of breath and wheezing. Cardiovascular:  Negative for chest pain, palpitations and leg swelling. Gastrointestinal:  Negative for abdominal distention, abdominal pain, blood in stool and vomiting. Endocrine: Negative for cold intolerance, heat intolerance and polyuria.    Genitourinary: Negative for difficulty urinating, dysuria, frequency and hematuria. Musculoskeletal:  Positive for myalgias. Negative for back pain, joint swelling and neck pain. Skin:  Negative for color change, pallor and rash. Allergic/Immunologic: Negative for immunocompromised state. Neurological:  Negative for dizziness, syncope, weakness, light-headedness, numbness and headaches. Hematological:  Negative for adenopathy. Does not bruise/bleed easily. Psychiatric/Behavioral:  Negative for behavioral problems, confusion, decreased concentration and suicidal ideas. The patient is not nervous/anxious. Vitals:    08/04/22 1542   BP: 110/66   Pulse: 68   SpO2: 96%    Weight: 216 lb 3.2 oz (98.1 kg)       Vitals:    08/04/22 1542   BP: 110/66   Site: Left Upper Arm   Position: Sitting   Cuff Size: Large Adult   Pulse: 68   SpO2: 96%   Weight: 216 lb 3.2 oz (98.1 kg)       BP Readings from Last 3 Encounters:   08/04/22 110/66   05/11/22 131/81   02/10/22 (!) 142/70       Wt Readings from Last 3 Encounters:   08/04/22 216 lb 3.2 oz (98.1 kg)   05/11/22 216 lb 8 oz (98.2 kg)   02/10/22 216 lb 6.4 oz (98.2 kg)       Physical Exam  Constitutional:       General: He is not in acute distress. Appearance: He is well-developed. He is not diaphoretic. HENT:      Head: Normocephalic and atraumatic. Eyes:      Pupils: Pupils are equal, round, and reactive to light. Neck:      Thyroid: No thyromegaly. Vascular: No JVD. Cardiovascular:      Rate and Rhythm: Normal rate and regular rhythm. Chest Wall: PMI is not displaced. Heart sounds: Normal heart sounds, S1 normal and S2 normal. No murmur heard. No friction rub. No gallop. Pulmonary:      Effort: Pulmonary effort is normal. No respiratory distress. Breath sounds: Normal breath sounds. No stridor. No wheezing or rales. Chest:      Chest wall: No tenderness. Abdominal:      General: Bowel sounds are normal. There is no distension. Palpations: Abdomen is soft. Tenderness: There is no abdominal tenderness. There is no guarding or rebound. Musculoskeletal:         General: No tenderness. Normal range of motion. Cervical back: Normal range of motion. Lymphadenopathy:      Cervical: No cervical adenopathy. Skin:     General: Skin is warm and dry. Findings: No erythema or rash. Neurological:      Mental Status: He is alert and oriented to person, place, and time. Coordination: Coordination normal.   Psychiatric:         Behavior: Behavior normal.         Thought Content:  Thought content normal.         Judgment: Judgment normal.       Labs:       Lab Results   Component Value Date    WBC 5.2 12/03/2021    HGB 15.7 12/03/2021    HCT 49.3 12/03/2021    MCV 91.7 12/03/2021     12/03/2021     Lab Results   Component Value Date     12/03/2021    K 4.6 12/03/2021     12/03/2021    CO2 23 12/03/2021    BUN 21 (H) 12/03/2021    CREATININE 1.2 12/03/2021    GLUCOSE 82 12/03/2021    CALCIUM 9.7 12/03/2021    PROT 6.5 12/03/2021    LABALBU 4.8 12/03/2021    BILITOT 0.3 12/03/2021    ALKPHOS 112 12/03/2021    AST 27 12/03/2021    ALT 27 12/03/2021    LABGLOM >60 12/03/2021    GFRAA >60 12/03/2021    AGRATIO 2.8 (H) 12/03/2021    GLOB 1.4 12/03/2020         Lab Results   Component Value Date    CHOL 177 08/01/2022    CHOL 189 12/03/2021    CHOL 162 12/03/2020     Lab Results   Component Value Date    TRIG 144 08/01/2022    TRIG 122 12/03/2021    TRIG 112 12/03/2020     Lab Results   Component Value Date    HDL 51 08/01/2022    HDL 58 12/03/2021    HDL 55 12/03/2020     Lab Results   Component Value Date    LDLCALC 97 08/01/2022    LDLCALC 107 (H) 12/03/2021    LDLCALC 85 12/03/2020     Lab Results   Component Value Date    LABVLDL 29 08/01/2022    LABVLDL 24 12/03/2021    LABVLDL 22 12/03/2020     No results found for: [facility]    Lab Results   Component Value Date    INR 2.53 10/18/2019    INR 0.94 08/29/2013 INR 0.88 (L) 08/19/2013    PROTIME 25.4 10/18/2019    PROTIME 12.4 08/29/2013    PROTIME 11.8 08/19/2013       The 10-year ASCVD risk score (Fito Merchant et al., 2013) is: 14.2%    Values used to calculate the score:      Age: 69 years      Sex: Male      Is Non- : No      Diabetic: No      Tobacco smoker: No      Systolic Blood Pressure: 216 mmHg      Is BP treated: Yes      HDL Cholesterol: 51 mg/dL      Total Cholesterol: 177 mg/dL      Imaging:       Last ECG (if available, Personally interpreted):  Sinus bradycardia, first-degree block    Last Monitor/Holter  [facility] Holter report: PAC, PVC, 3 episodes of SVT     Last Stress (if available):    Last Cath (if available):    Last TTE/GEREMIAS(if available): 7/1/19  Summary    There is evidence of right-to-left intracardiac shunting/patent foramen    ovale by agitated saline bubble contrast study. Normal left ventricular size, wall thickness & systolic function. Normal left ventricular inflow profile. There are no significant segmental wall motion abnormalities. The left ventricular ejection fraction is calculated at 75 %. Last CMR  (if available):      Assessment / Plan:     Hypertension  Controlled on diltiazem and losartan    Intermittent atrial fibrillation ([address]  Follows with EP. Continue Eliquis. Continue diltiazem    Hyperlipidemia  Unable to tolerate statins due to myalgias. Having some myalgias with Zetia. Lab Results   Component Value Date    [address] 08/01/2022   Plan for coronary calcium score for screening purposes, if abnormal start Leqvio and dc ezetimibe  Follow up in 1 months. I had the opportunity to review the clinical symptoms and presentation of Dk Alba. Patient's allergies and medications were reviewed and updated. Patient's past medical, surgical, social and family history were reviewed and updated.    Patient's testing including laboratory, ECGs, monitor, imaging (TTE,GEREMIAS,CMR,cath) were reviewed. All questions and concerns were addressed to the patient/family. Alternatives to my treatment were discussed. The note was completed using EMR. Every effort wasmade to ensure accuracy; however, inadvertent computerized transcription errors may be present. Thank you for allowing me to participate in thecare or Dk Daniel MD, [facility]

## 2022-08-04 ENCOUNTER — OFFICE VISIT (OUTPATIENT)
Dept: CARDIOLOGY CLINIC | Age: 67
End: 2022-08-04
Payer: COMMERCIAL

## 2022-08-04 VITALS
DIASTOLIC BLOOD PRESSURE: 66 MMHG | OXYGEN SATURATION: 96 % | WEIGHT: 216.2 LBS | BODY MASS INDEX: 30.15 KG/M2 | HEART RATE: 68 BPM | SYSTOLIC BLOOD PRESSURE: 110 MMHG

## 2022-08-04 DIAGNOSIS — I10 HYPERTENSION, UNSPECIFIED TYPE: ICD-10-CM

## 2022-08-04 DIAGNOSIS — I48.0 INTERMITTENT ATRIAL FIBRILLATION (HCC): ICD-10-CM

## 2022-08-04 DIAGNOSIS — E78.5 HYPERLIPIDEMIA, UNSPECIFIED HYPERLIPIDEMIA TYPE: Primary | ICD-10-CM

## 2022-08-04 PROCEDURE — 1036F TOBACCO NON-USER: CPT | Performed by: INTERNAL MEDICINE

## 2022-08-04 PROCEDURE — 1123F ACP DISCUSS/DSCN MKR DOCD: CPT | Performed by: INTERNAL MEDICINE

## 2022-08-04 PROCEDURE — G8427 DOCREV CUR MEDS BY ELIG CLIN: HCPCS | Performed by: INTERNAL MEDICINE

## 2022-08-04 PROCEDURE — 99214 OFFICE O/P EST MOD 30 MIN: CPT | Performed by: INTERNAL MEDICINE

## 2022-08-04 PROCEDURE — G8417 CALC BMI ABV UP PARAM F/U: HCPCS | Performed by: INTERNAL MEDICINE

## 2022-08-04 PROCEDURE — 3017F COLORECTAL CA SCREEN DOC REV: CPT | Performed by: INTERNAL MEDICINE

## 2022-08-04 NOTE — ASSESSMENT & PLAN NOTE
Unable to tolerate statins due to myalgias. Having some myalgias with Zetia.   Lab Results   Component Value Date    [address] 08/01/2022   Plan for coronary calcium score for screening purposes, if abnormal start Leqvio and perla ezetimibe

## 2022-08-30 ENCOUNTER — NURSE ONLY (OUTPATIENT)
Dept: CARDIOLOGY CLINIC | Age: 67
End: 2022-08-30
Payer: COMMERCIAL

## 2022-08-30 DIAGNOSIS — Z45.09 ENCOUNTER FOR LOOP RECORDER CHECK: ICD-10-CM

## 2022-08-30 DIAGNOSIS — I48.0 INTERMITTENT ATRIAL FIBRILLATION (HCC): Primary | ICD-10-CM

## 2022-08-30 PROCEDURE — 93298 REM INTERROG DEV EVAL SCRMS: CPT | Performed by: INTERNAL MEDICINE

## 2022-08-30 PROCEDURE — G2066 INTER DEVC REMOTE 30D: HCPCS | Performed by: INTERNAL MEDICINE

## 2022-08-30 NOTE — PROGRESS NOTES
We received a remote transmission from patient's monitor at home. Remote Linq report shows known AF. Pt is on Eliquis. EP physician to review. We will continue to monitor remotely. Implanted for stroke. End of 31-day monitoring period 8-30-22.

## 2022-09-27 ENCOUNTER — TELEPHONE (OUTPATIENT)
Dept: CARDIOLOGY CLINIC | Age: 67
End: 2022-09-27

## 2022-09-27 RX ORDER — LOSARTAN POTASSIUM 100 MG/1
TABLET ORAL
Qty: 90 TABLET | Refills: 3 | Status: SHIPPED | OUTPATIENT
Start: 2022-09-27

## 2022-09-27 RX ORDER — EZETIMIBE 10 MG/1
10 TABLET ORAL DAILY
Qty: 90 TABLET | Refills: 3 | Status: SHIPPED | OUTPATIENT
Start: 2022-09-27

## 2022-09-27 RX ORDER — EZETIMIBE 10 MG/1
10 TABLET ORAL DAILY
Qty: 90 TABLET | Refills: 3 | Status: SHIPPED | OUTPATIENT
Start: 2022-09-27 | End: 2022-09-27 | Stop reason: SDUPTHER

## 2022-09-27 NOTE — TELEPHONE ENCOUNTER
Requested Prescriptions     Pending Prescriptions Disp Refills    ezetimibe (ZETIA) 10 MG tablet 90 tablet 3     Sig: Take 1 tablet by mouth daily              Last Office Visit: 8/4/2022     Next Office Visit: Visit date not found       Last Labs: 08.01.2022

## 2022-09-27 NOTE — TELEPHONE ENCOUNTER
Requested Prescriptions     Pending Prescriptions Disp Refills    losartan (COZAAR) 100 MG tablet [Pharmacy Med Name: LOSARTAN TABS 100MG] 90 tablet 3     Sig: TAKE 1 TABLET DAILY                Last Office Visit: [phone]  Next Office Visit: Visit date not found

## 2022-09-28 ENCOUNTER — TELEPHONE (OUTPATIENT)
Dept: FAMILY MEDICINE CLINIC | Age: 67
End: 2022-09-28

## 2022-09-29 RX ORDER — DILTIAZEM HYDROCHLORIDE 180 MG/1
180 CAPSULE, COATED, EXTENDED RELEASE ORAL DAILY
Qty: 90 CAPSULE | Refills: 3 | Status: SHIPPED | OUTPATIENT
Start: 2022-09-29 | End: 2022-09-30 | Stop reason: SDUPTHER

## 2022-09-29 NOTE — TELEPHONE ENCOUNTER
Pt states pharmacy [facility] is needing a call from provider to refill Diltiazem 180 mg bid. Please callPharmacy.

## 2022-09-29 NOTE — TELEPHONE ENCOUNTER
Received refill request for Diltiazem from [address]     Last ov: 2022 MXA    Last EK2022    Last Refill: 2021 #90 w/ 3 refills    Next appointment: 2022 NPSR

## 2022-09-30 ENCOUNTER — TELEPHONE (OUTPATIENT)
Dept: CARDIOLOGY CLINIC | Age: 67
End: 2022-09-30

## 2022-09-30 RX ORDER — DILTIAZEM HYDROCHLORIDE 180 MG/1
180 CAPSULE, COATED, EXTENDED RELEASE ORAL DAILY
Qty: 90 CAPSULE | Refills: 3 | Status: SHIPPED | OUTPATIENT
Start: 2022-09-30

## 2022-10-03 NOTE — PROGRESS NOTES
We received a remote transmission from patient's monitor at home. Remote Linq report shows known AF. Pt is on Eliquis. EP physician to review. We will continue to monitor remotely. Implanted for stroke. End of 31-day monitoring period 10-4-22.

## 2022-10-04 ENCOUNTER — NURSE ONLY (OUTPATIENT)
Dept: CARDIOLOGY CLINIC | Age: 67
End: 2022-10-04
Payer: COMMERCIAL

## 2022-10-04 DIAGNOSIS — Z45.09 ENCOUNTER FOR LOOP RECORDER CHECK: ICD-10-CM

## 2022-10-04 DIAGNOSIS — I48.0 INTERMITTENT ATRIAL FIBRILLATION (HCC): Primary | ICD-10-CM

## 2022-10-04 PROCEDURE — 93298 REM INTERROG DEV EVAL SCRMS: CPT | Performed by: INTERNAL MEDICINE

## 2022-10-04 PROCEDURE — G2066 INTER DEVC REMOTE 30D: HCPCS | Performed by: INTERNAL MEDICINE

## 2022-10-10 NOTE — PROGRESS NOTES
[facility]   Electrophysiology  YVETTE Garcia-CNP  Attending EP: Dr. Karyn Osorio    Date: 10/10/2022  I had the privilege of visiting Dk lAba in the office. Chief Complaint:   No chief complaint on file. History of Present Illness: History obtained from patient and medical record. Dk Alba is 69 y.o. male with a past medical history of HLD, asthma, PFO, PAF, SVT, and TIA. S/p ILR implant (10/19)    -Interval history: Today, Dk Alba is being seen for ***    Denies having chest pain, palpitations, shortness of breath, orthopnea/PND, cough, or dizziness at the time of this visit. With regard to medication therapy the patient has been compliant with prescribed regimen. They have tolerated therapy to date. Allergies: Allergies   Allergen Reactions    Crestor [Rosuvastatin] Other (See Comments)     Muscle cramps    Percocet [Oxycodone-Acetaminophen] Other (See Comments)     constipation     Home Medications:  Prior to Visit Medications    Medication Sig Taking? Authorizing Provider   dilTIAZem (CARDIZEM CD) 180 MG extended release capsule Take 1 capsule by mouth daily  Melonie Jones MD   losartan (COZAAR) 100 MG tablet TAKE 1 TABLET DAILY  Pradip Wise MD   ezetimibe (ZETIA) 10 MG tablet Take 1 tablet by mouth daily  Pradip Wise MD   ELIQUIS 5 MG TABS tablet TAKE 1 TABLET BY MOUTH  TWICE DAILY  YVETTE Garcia CNP   Coenzyme Q-10 200 MG CAPS   Historical Provider, MD   IPRATROPIUM BROMIDE NA by Nasal route  Historical Provider, MD   chlorpheniramine (CHLOR-TRIMETON) 4 MG tablet Take 4 mg by mouth every 6 hours as needed for Allergies  Historical Provider, MD   hydrocortisone (WESTCORT) 0.2 % cream Apply topically 2 times daily. Justine Russell MD   Multiple Vitamins-Minerals [facility] FOR HIM 50+) TABS Take  by mouth.     Historical Provider, MD      Past Medical History:  Past Medical History:   Diagnosis Date    Basal cell carcinoma of right nasal sidewall 6/4/2015    BPH (benign prostatic hyperplasia) 10/18/2010    Colon, diverticulosis 12/24/2018    Hyperlipidemia 10/18/2010    Hypertension 3/22/2021    Intermittent atrial fibrillation ([facility] [address] 10/13/2020    Nephrolithiasis     s/p lithotripsy x 4, calcium oxalate. PFO (patent foramen ovale)     Raynaud's disease 12/4/2014    Wears glasses      Past Surgical History:    has a past surgical history that includes hernia repair; Prostate surgery (04/2005); other surgical history; Colonoscopy (2007); Kidney stone surgery (Left, 08/29/2013); Cystocopy (11/14/2013); Mohs surgery (06/2015); and Colonoscopy (2018). Social History:  Reviewed. reports that he has never smoked. He has never used smokeless tobacco. He reports that he does not drink alcohol and does not use drugs. Family History:  Reviewed. family history includes Cancer in his mother; Heart Disease in his father; Hypertension in his father. Review of System:  Constitutional: Negative for fever, night sweats, chills, weight changes, or weakness  Skin: Negative for rash, dry skin, pruritus, bruising, bleeding, blood clots, or changes in skin pigment  HEENT: Negative for vision changes, ringing in the ears, sore throat, dysphagia, or swollen lymph nodes  Respiratory: Reviewed in HPI  Cardiovascular: Reviewed in HPI  Gastrointestinal: Negative for abdominal pain, N/V/D, constipation, or black/tarry stools  Genito-Urinary: Negative for dysuria, incontinence, urgency, or hematuria  Musculoskeletal: Negative for joint swelling, muscle pain, or injuries  Neurological/Psych: Negative for confusion, seizures, dizziness, headaches, balance issues or TIA-like symptoms. No anxiety, depression, or insomnia    Physical Examination:  There were no vitals filed for this visit.      Wt Readings from Last 3 Encounters:   08/04/22 216 lb 3.2 oz (98.1 kg)   05/11/22 216 lb 8 oz (98.2 kg)   02/10/22 216 lb 6.4 oz (98.2 kg)     Constitutional: Cooperative and in no apparent distress, and appears well nourished  Skin: Warm and pink; no pallor, cyanosis, bruising, or clubbing  HEENT: Symmetric and normocephalic. PERRL, EOM intact. Conjunctiva pink with clear sclera. Mucus membranes pink and moist. Teeth intact. Thyroid smooth without nodules or goiter  Respiratory: Respirations symmetric and unlabored. Lungs clear to auscultation bilaterally, no wheezing, rhonchi, or crackles  Cardiovascular:  Regular rate and rhythm. S1/S2 present without murmurs, rubs, or gallops. Peripheral pulses 2+, capillary refill < 3 seconds. No elevation of JVP. No peripheral edema  Gastrointestinal: Abdomen soft and round. Bowel sounds normoactive in all quadrants without tenderness or masses. Musculoskeletal: Bilateral upper and lower extremity strength 5/5 with full ROM. Neurological/Psych: Awake and orientated to person, place and time. Calm affect, appropriate mood. Pertinent labs, diagnostic, device, and imaging results reviewed as a part of this visit    LABS    CBC:   Lab Results   Component Value Date    WBC 5.2 12/03/2021    HGB 15.7 12/03/2021    HCT 49.3 12/03/2021    MCV 91.7 12/03/2021     12/03/2021     BMP:   Lab Results   Component Value Date    CREATININE 1.2 12/03/2021    BUN 21 (H) 12/03/2021     12/03/2021    K 4.6 12/03/2021     12/03/2021    CO2 23 12/03/2021     CrCl cannot be calculated (Patient's most recent lab result is older than the maximum 180 days allowed. ).      Thyroid:   Lab Results   Component Value Date    TSH 2.54 03/27/2019     Lipid Panel:   Lab Results   Component Value Date/Time    CHOL 177 08/01/2022 10:51 AM    HDL 51 08/01/2022 10:51 AM    HDL 62 11/23/2011 09:25 AM    TRIG 144 08/01/2022 10:51 AM     LFTs:  Lab Results   Component Value Date    ALT 27 12/03/2021    AST 27 12/03/2021    ALKPHOS 112 12/03/2021    BILITOT 0.3 12/03/2021     Coags:   Lab Results   Component Value Date    PROTIME 25.4 10/18/2019    INR 2.53 10/18/2019 APTT 30.3 08/19/2013       ECG: 10/10/2022  - ***    Echo: 7/19   There is evidence of right-to-left intracardiac shunting/patent foramen   ovale by agitated saline bubble contrast study. Normal left ventricular size, wall thickness & systolic function. Normal left ventricular inflow profile. There are no significant segmental wall motion abnormalities. The left ventricular ejection fraction is calculated at 75 %. GXT: None    Assessment:    1. Paroxysmal Atrial Fibrillation  - Currently in ***  - Continue cardizem 180 mg QD   ~ Limited in medical therapy due to low resting HR    - [id number] score: 3 (Age, HTN, TIA) ; [id number] Vasc score and anticoagulation discussed. High risk for stroke and thromboembolism. Anticoagulation is recommended. Risk of bleeding was discussed.  ~ On Eliquis 5 mg BID; tolerating well    - Afib risk factors including age, HTN, obesity, inactivity and ORQUIDEA were discussed with patient. Risk factor modification recommended   ~ TSH 2.54 (3/19)   ~ Need sleep evaluation      - Treatment options including cardioversion, rate control strategy, antiarrhythmics, anticoagulation and possible ablation were discussed with patient. Risks, benefits and alternative of each treatment options were explained. All questions answered    ~ Pt will consider an ablation if his burden worsens as medication options are limited given his low baseline HR. He will check to see if he has sleep apnea to see if his burden improves with adequate tx    2. Implantable Loop Recorder  - S/p ILR insertion on 10/19  -The CIED was interrogated and programmed and I supervised and reviewed all the data. All findings and changes are in device interrogation sheat and reflect my personal interpretation and changes and is scanned to Epic  - Device shows: AF burden 0.7% (Episodes lasting 1-7 hours; last on November 1st)  - Follow up with device clinic as scheduled    3. SVT   - Noted on loop   - Continue CCB    4. HTN  - Controlled: Goal <130/80  - Continue current medications  - Encouraged to monitor and log BP readings at home, then bring log to next visit  - Discussed importance of low sodium diet, weight control and exercise    5. At Risk for Sleep Apnea  - Multiple risk factors for ORQUIDEA  - Discussed long term effects of untreated ORQUIDEA  - Referral to sleep study center    6. Hx of TIA   - On Eliquis, plavix, rosuvastatin    7. PFO   - On Eliquis   - Followed by Dr. Jackie Saavedra    8. Hyperlipidemia  - Controlled. Goal: LDL <100   ~ LDL 85 (12/20)  - On rosuvastatin 5 mg QD  - Discussed diet, weight loss, and exercise needs  - Followed per PCP    Plan:  1. F/U: Follow-up with NPSR in ***  -Follow up with device clinic as scheduled  -Call [facility] at [phone] with any questions    Diet & Exercise: The patient is counseled to follow a low salt diet to assure blood pressure remains controlled for cardiovascular risk factor modification  The patient is counseled to avoid excess caffeine, and energy drinks as this may exacerbated ectopy and arrhythmia  The patient is counseled to lose weight to control cardiovascular risk factors  Exercise program discussed: To improve overall cardiovascular health, the patient is instructed to increase cardiovascular related activities with a goal of 150 min/week of moderate level activity or 10,000 steps per day. Encouraged to perform as much activity as tolerated     I have addressed the patient's cardiac risk factors and adjusted pharmacologic treatment as needed. In addition, I have reinforced the need for patient directed risk factor modification. I independently reviewed the device check interrogation and ECG    All questions and concerns were addressed with the patient. Alternatives to treatment were discussed.      Thank you for allowing to us to participate in the care of Dk Alba     *** minutes spent preparing to see patient including reviewing patient history/prior tests/prior consults, performing a medical exam, counseling and educating patient/family/caregiver, ordering medications/tests/procedures, referring and communicating with PCPs and other pertinent consultants, documenting information in the EMR, independently interpreting results and communicating to family and coordination of patient care.      Janelle Marin, YVETTE-CNP  [facility]   Office: [phone]

## 2022-10-11 ENCOUNTER — HOSPITAL ENCOUNTER (OUTPATIENT)
Dept: CT IMAGING | Age: 67
Discharge: HOME | End: 2022-10-11
Payer: COMMERCIAL

## 2022-10-11 DIAGNOSIS — E78.5 HYPERLIPIDEMIA, UNSPECIFIED HYPERLIPIDEMIA TYPE: ICD-10-CM

## 2022-10-11 PROCEDURE — 75571 CT HRT W/O DYE W/CA TEST: CPT

## 2022-10-13 DIAGNOSIS — I25.10 CORONARY ARTERY DISEASE INVOLVING NATIVE HEART WITHOUT ANGINA PECTORIS, UNSPECIFIED VESSEL OR LESION TYPE: ICD-10-CM

## 2022-10-13 DIAGNOSIS — E78.2 MIXED HYPERLIPIDEMIA: Primary | ICD-10-CM

## 2022-10-13 RX ORDER — INCLISIRAN 284 MG/1.5ML
284 INJECTION, SOLUTION SUBCUTANEOUS
Qty: 1.5 ML | Refills: 5 | Status: SHIPPED | OUTPATIENT
Start: 2022-10-13 | End: 2022-10-31 | Stop reason: ALTCHOICE

## 2022-10-14 RX ORDER — ACETAMINOPHEN 325 MG/1
650 TABLET ORAL
OUTPATIENT
Start: 2022-10-14

## 2022-10-14 RX ORDER — SODIUM CHLORIDE 9 MG/ML
INJECTION, SOLUTION INTRAVENOUS CONTINUOUS
OUTPATIENT
Start: 2022-10-14

## 2022-10-14 RX ORDER — DIPHENHYDRAMINE HYDROCHLORIDE 50 MG/ML
50 INJECTION INTRAMUSCULAR; INTRAVENOUS
OUTPATIENT
Start: 2022-10-14

## 2022-10-14 RX ORDER — ONDANSETRON 2 MG/ML
8 INJECTION INTRAMUSCULAR; INTRAVENOUS
OUTPATIENT
Start: 2022-10-14

## 2022-10-14 RX ORDER — ALBUTEROL SULFATE 90 UG/1
4 AEROSOL, METERED RESPIRATORY (INHALATION) PRN
OUTPATIENT
Start: 2022-10-14

## 2022-10-24 ENCOUNTER — TELEPHONE (OUTPATIENT)
Dept: CARDIOLOGY CLINIC | Age: 67
End: 2022-10-24

## 2022-10-24 NOTE — TELEPHONE ENCOUNTER
Pt called and advised [facility] gave him referral for sleep study w/Dr Monae Shirley. He called insurance and they advised he needs our office to get a preauthorization. Please call Oksana at [phone]. Thank you.

## 2022-10-25 NOTE — TELEPHONE ENCOUNTER
Spoke with [place]. He does not need prior authorization to see sleep medicine. If he does see  sleep medicine and they determine that he needs a sleep study then the sleep medicine physicians office will do a prior authorization  at that time. He can call an make his appointment wit sleep medicine.

## 2022-10-27 ENCOUNTER — TELEPHONE (OUTPATIENT)
Dept: CARDIOLOGY CLINIC | Age: 67
End: 2022-10-27

## 2022-10-27 NOTE — TELEPHONE ENCOUNTER
10/27 pt called i stating she called Dr. Ricardo Soto office to schedule sleep study and there was no availability until February of 2023, pt requesting 2 more recommendations, please advise.

## 2022-10-28 ENCOUNTER — TELEPHONE (OUTPATIENT)
Dept: CARDIOLOGY CLINIC | Age: 67
End: 2022-10-28

## 2022-10-28 NOTE — TELEPHONE ENCOUNTER
Patient is calling to reschedule his upcoming appointment with Jalyn Shaw with a device check.  Please call patient [phone]

## 2022-10-28 NOTE — TELEPHONE ENCOUNTER
Left message on pt's vm advising him of message below and to call office back if he has any other questions or concerns.

## 2022-10-28 NOTE — TELEPHONE ENCOUNTER
Please reschedule per patient request.  First available if no complaints/symptoms.      Diana Leigh, APRN-CNP

## 2022-10-28 NOTE — TELEPHONE ENCOUNTER
Lyubov Peterson called in and said that the request for Dane Hatfield was denied, patient needs to try Cesar Offer before they will approve it.  Please advise [phone]

## 2022-10-31 RX ORDER — EVOLOCUMAB 140 MG/ML
140 INJECTION, SOLUTION SUBCUTANEOUS
Qty: 2.1 ML | Refills: 5 | Status: SHIPPED | OUTPATIENT
Start: 2022-10-31 | End: 2022-11-01 | Stop reason: SDUPTHER

## 2022-10-31 NOTE — TELEPHONE ENCOUNTER
Called and LVM letting patient know medication was denied. Will try Repatha-prescription sent to [facility].

## 2022-11-01 DIAGNOSIS — E78.5 HYPERLIPIDEMIA, UNSPECIFIED HYPERLIPIDEMIA TYPE: Primary | ICD-10-CM

## 2022-11-01 RX ORDER — EVOLOCUMAB 140 MG/ML
140 INJECTION, SOLUTION SUBCUTANEOUS
Qty: 2.1 ML | Refills: 5 | Status: SHIPPED | OUTPATIENT
Start: 2022-11-01 | End: 2022-11-18

## 2022-11-01 NOTE — TELEPHONE ENCOUNTER
Called pt to schedule. Pt states that he is experiencing chest pain, little sob, and fatigue. Pt. Denies having any of these symptoms right now or today. Two noticeable episodes over the last month. Last Tuesday pt did send a transmission when he experienced these symptoms. Is there a date we could fit him in? Please advise.  Thank you

## 2022-11-03 NOTE — PROGRESS NOTES
[facility]   Electrophysiology Follow up   Date: 11/3/2022    CC: Follow up paroxsymal atrial fibrillation    HPI: Dk Alba is a 69 y.o. male history of PFO, TIA, paroxysmal atrial fibrillation, implantable loop recorder (10/2019). Follows in electrophysiology clinic for paroxysmal atrial fibrillation. He is here for follow-up. He reports infrequent episodes of racing heart with accompanying shortness of breath and fatigue. Usually with activity. Denies exertional chest pain. These episodes are increased in frequency since last visit. He has a sleep study pending. Review of System:  [x] Full ROS obtained and negative except as mentioned in HPI      Prior to Admission medications    Medication Sig Start Date End Date Taking? Authorizing Provider   Evolocumab (REPATHA) 140 MG/ML SOSY Inject 1 mL into the skin every 14 days 11/1/22   Alessandra Hager MD   dilTIAZem (CARDIZEM CD) 180 MG extended release capsule Take 1 capsule by mouth daily 9/30/22   Marybeth Vee MD   losartan (COZAAR) 100 MG tablet TAKE 1 TABLET DAILY 9/27/22   Alessandra Hager MD   ezetimibe (ZETIA) 10 MG tablet Take 1 tablet by mouth daily 9/27/22   Alessandra Hager MD   ELIQUIS 5 MG TABS tablet TAKE 1 TABLET BY MOUTH  TWICE DAILY 1/19/22   YVETTE Watt - CNP   Coenzyme Q-10 200 MG CAPS  12/1/17   Historical Provider, MD   IPRATROPIUM BROMIDE NA by Nasal route    Historical Provider, MD   chlorpheniramine (CHLOR-TRIMETON) 4 MG tablet Take 4 mg by mouth every 6 hours as needed for Allergies    Historical Provider, MD   hydrocortisone (WESTCORT) 0.2 % cream Apply topically 2 times daily. 2/16/17   Elin Coughlin MD   Multiple Vitamins-Minerals [facility] FOR HIM 50+) TABS Take  by mouth.       Historical Provider, MD       Past Medical History:   Diagnosis Date    Basal cell carcinoma of right nasal sidewall 6/4/2015    BPH (benign prostatic hyperplasia) 10/18/2010    Colon, diverticulosis 12/24/2018 Hyperlipidemia 10/18/2010    Hypertension 3/22/2021    Intermittent atrial fibrillation ([address] 10/13/2020    Nephrolithiasis     s/p lithotripsy x 4, calcium oxalate. PFO (patent foramen ovale)     Raynaud's disease 12/4/2014    Wears glasses         Past Surgical History:   Procedure Laterality Date    COLONOSCOPY  2007    q 10,5/2018    COLONOSCOPY  2018    repeat q 10-diverticulosis    CYSTOSCOPY  11/14/2013     CYSTOSCOPY TRANSURETHRAL RESECTION PROSTATE    HERNIA REPAIR      KIDNEY STONE SURGERY Left 08/29/2013    perc. nephrolithotomy,stint placement    MOHS SURGERY  06/2015    [facility]-Dr. Iban Coronado rt nasal sidewall    OTHER SURGICAL HISTORY      perirectal abscess 1992    PROSTATE SURGERY  04/2005    biopsy       Allergies   Allergen Reactions    Crestor [Rosuvastatin] Other (See Comments)     Muscle cramps    Percocet [Oxycodone-Acetaminophen] Other (See Comments)     constipation       Social History:  Reviewed. reports that he has never smoked. He has never used smokeless tobacco. He reports that he does not drink alcohol and does not use drugs. Family History:  Reviewed. No family history of SCD. Physical Examination:  There were no vitals filed for this visit.    Wt Readings from Last 3 Encounters:   08/04/22 216 lb 3.2 oz (98.1 kg)   05/11/22 216 lb 8 oz (98.2 kg)   02/10/22 216 lb 6.4 oz (98.2 kg)     No acute distress  Chest is clear, nonlabored, no rhonchi or wheeze  Heart is regular rate and rhythm, normal S1 and S2, extremities are warm and well perfused, no jugular venous distention  Abdomen is soft nontender  Strength is grossly preserved, normal tone  No focal neurodeficits  Appropriate mood and affect  No rashes or ecchymoses    Labs:  Lab Results   Component Value Date    ALT 27 12/03/2021    AST 27 12/03/2021     12/03/2021    K 4.6 12/03/2021    HGB 15.7 12/03/2021     12/03/2021    CREATININE 1.2 12/03/2021    BUN 21 (H) 12/03/2021    TSH 2.54 03/27/2019    INR 2.53 10/18/2019         Imaging:  ECG 11/3/22  Sinus, RB, PVC, first degree AVB  429 QTcH, 158QRS    Echo 06/30/2019   Summary    There is evidence of right-to-left intracardiac shunting/patent foramen    ovale by agitated saline bubble contrast study. Normal left ventricular size, wall thickness & systolic function. Normal left ventricular inflow profile. There are no significant segmental wall motion abnormalities. The left ventricular ejection fraction is calculated at 75 %. I independently reviewed relevant and available cardiac diagnostic tests ECG, CXR, Echo, Stress test, Device interrogation, Holter, CT scan. Assessment/Plan:    Paroxsymal Atrial Fibrillation   - <1% AT/AF burden per device interrogation today. - [id number] 4 (age, HTN, TIA)  - Continue Eliquis 5 mg BID  - Continue Cardizem 180 mg QD for rate control   - [place] remains low, < 1%, continue current strategy, increased burden/symptoms then discuss AAD vs ablation at that time  - upcoming sleep study    TIA 06/30/2019  - s/p ILR 10/22/2019  - anticoagulation for AF    HLD   - unable to tolerate statins due to myalgias  - on Zetia 10 mg daily and Repatha 140 mg Q14 days    HTN  - Controlled: 136/74  - BP goal <130/80  - encouraged daily home monitoring, record evening blood pressure and report findings  - maximum losartan, if elevated then increase diltiazem     Follow up in 6 months       Scribe attestation: This note was scribed in the presence of Asif Celestin MD by Johnny Joya LPN    Physician Attestation: I, Dr. Asif Celestin, confirm that the scribe's documentation has been prepared under my direction and personally reviewed by me in its entirety. I also confirm that the note above accurately reflects all work, treatment, procedures, and medical decision making performed by me. NOTE: This report was transcribed using voice recognition software.  Every effort was made to ensure accuracy, however, inadvertent computerized transcription errors may be present.      Hazel Coyne MD  [facility]   Office: [phone]  Fax: [phone]

## 2022-11-07 ASSESSMENT — ENCOUNTER SYMPTOMS
BACK PAIN: 0
COUGH: 0
SHORTNESS OF BREATH: 0
FACIAL SWELLING: 0
ABDOMINAL PAIN: 0
WHEEZING: 0
EYE DISCHARGE: 0
VOMITING: 0
COLOR CHANGE: 0
CHEST TIGHTNESS: 0
BLOOD IN STOOL: 0
ABDOMINAL DISTENTION: 0

## 2022-11-07 NOTE — PROGRESS NOTES
[facility]     Outpatient Cardiology         Chief Complaint   Patient presents with    Hyperlipidemia    Atrial Fibrillation       HPI     Dk Chicas a 69 y.o. male here for follow up for CT calcium score of 167. Hx of HLD, HTN, TIA, PVC, PAC. Follows Dr. Karyn Osorio for ILR. Currently taking diltiazem and Eliquis for A. fib, well controlled. Hyperlipidemia, given elevated calcium score I would like him to start [address]. Had significant side effects/myalgias with statins. Currently on Zetia although LDL remains above 55 and having some mild left side effects. Today we discussed starting Repatha. Hypertension, controlled on diltiazem. A. fib, managed by EP, on Eliquis. Very active and doing very well from a cardiovascular perspective. PMH  Past Medical History:   Diagnosis Date    Basal cell carcinoma of right nasal sidewall 6/4/2015    BPH (benign prostatic hyperplasia) 10/18/2010    Colon, diverticulosis 12/24/2018    Hyperlipidemia 10/18/2010    Hypertension 3/22/2021    Intermittent atrial fibrillation ([address] 10/13/2020    Nephrolithiasis     s/p lithotripsy x 4, calcium oxalate.      PFO (patent foramen ovale)     Raynaud's disease 12/4/2014    Wears glasses        PSH  Past Surgical History:   Procedure Laterality Date    COLONOSCOPY  2007    q 10,5/2018    COLONOSCOPY  2018    repeat q 10-diverticulosis    CYSTOSCOPY  11/14/2013     CYSTOSCOPY TRANSURETHRAL RESECTION PROSTATE    HERNIA REPAIR      KIDNEY STONE SURGERY Left 08/29/2013    perc. nephrolithotomy,stint placement    MOHS SURGERY  06/2015    BCC-Dr. Parviz Segura rt nasal sidewall    OTHER SURGICAL HISTORY      perirectal abscess 1992    PROSTATE SURGERY  04/2005    biopsy        Social HIstory  Social History     Tobacco Use    Smoking status: Never    Smokeless tobacco: Never   Substance Use Topics    Alcohol use: No    Drug use: No       Family History  Family History   Problem Relation Age of Onset fever and unexpected weight change. HENT:  Negative for congestion, facial swelling, mouth sores and nosebleeds. Eyes:  Negative for discharge and visual disturbance. Respiratory:  Negative for cough, chest tightness, shortness of breath and wheezing. Cardiovascular:  Negative for chest pain, palpitations and leg swelling. Gastrointestinal:  Negative for abdominal distention, abdominal pain, blood in stool and vomiting. Endocrine: Negative for cold intolerance, heat intolerance and polyuria. Genitourinary:  Negative for difficulty urinating, dysuria, frequency and hematuria. Musculoskeletal:  Positive for myalgias. Negative for back pain, joint swelling and neck pain. Skin:  Negative for color change, pallor and rash. Allergic/Immunologic: Negative for immunocompromised state. Neurological:  Negative for dizziness, syncope, weakness, light-headedness, numbness and headaches. Hematological:  Negative for adenopathy. Does not bruise/bleed easily. Psychiatric/Behavioral:  Negative for behavioral problems, confusion, decreased concentration and suicidal ideas. The patient is not nervous/anxious. Vitals:    11/09/22 1111   BP: 110/60   Pulse: 78   SpO2: 97%    Weight: 203 lb 9.6 oz (92.4 kg)       Vitals:    11/09/22 1111   BP: 110/60   Site: Left Upper Arm   Position: Sitting   Cuff Size: Medium Adult   Pulse: 78   SpO2: 97%   Weight: 203 lb 9.6 oz (92.4 kg)       BP Readings from Last 3 Encounters:   11/09/22 110/60   08/04/22 110/66   05/11/22 131/81       Wt Readings from Last 3 Encounters:   11/09/22 203 lb 9.6 oz (92.4 kg)   08/04/22 216 lb 3.2 oz (98.1 kg)   05/11/22 216 lb 8 oz (98.2 kg)       Physical Exam  Constitutional:       General: He is not in acute distress. Appearance: He is well-developed. He is not diaphoretic. HENT:      Head: Normocephalic and atraumatic. Eyes:      Pupils: Pupils are equal, round, and reactive to light. Neck:      Thyroid: No thyromegaly. Vascular: No JVD. Cardiovascular:      Rate and Rhythm: Normal rate and regular rhythm. Chest Wall: PMI is not displaced. Heart sounds: Normal heart sounds, S1 normal and S2 normal. No murmur heard. No friction rub. No gallop. Pulmonary:      Effort: Pulmonary effort is normal. No respiratory distress. Breath sounds: Normal breath sounds. No stridor. No wheezing or rales. Chest:      Chest wall: No tenderness. Abdominal:      General: Bowel sounds are normal. There is no distension. Palpations: Abdomen is soft. Tenderness: There is no abdominal tenderness. There is no guarding or rebound. Musculoskeletal:         General: No tenderness. Normal range of motion. Cervical back: Normal range of motion. Lymphadenopathy:      Cervical: No cervical adenopathy. Skin:     General: Skin is warm and dry. Findings: No erythema or rash. Neurological:      Mental Status: He is alert and oriented to person, place, and time. Coordination: Coordination normal.   Psychiatric:         Behavior: Behavior normal.         Thought Content:  Thought content normal.         Judgment: Judgment normal.       Labs:       Lab Results   Component Value Date    WBC 5.2 12/03/2021    HGB 15.7 12/03/2021    HCT 49.3 12/03/2021    MCV 91.7 12/03/2021     12/03/2021     Lab Results   Component Value Date     12/03/2021    K 4.6 12/03/2021     12/03/2021    CO2 23 12/03/2021    BUN 21 (H) 12/03/2021    CREATININE 1.2 12/03/2021    GLUCOSE 82 12/03/2021    CALCIUM 9.7 12/03/2021    PROT 6.5 12/03/2021    LABALBU 4.8 12/03/2021    BILITOT 0.3 12/03/2021    ALKPHOS 112 12/03/2021    AST 27 12/03/2021    ALT 27 12/03/2021    LABGLOM >60 12/03/2021    GFRAA >60 12/03/2021    AGRATIO 2.8 (H) 12/03/2021    GLOB 1.4 12/03/2020         Lab Results   Component Value Date    CHOL 177 08/01/2022    CHOL 189 12/03/2021    CHOL 162 12/03/2020     Lab Results   Component Value Date TRIG 144 08/01/2022    TRIG 122 12/03/2021    TRIG 112 12/03/2020     Lab Results   Component Value Date    HDL 51 08/01/2022    HDL 58 12/03/2021    HDL 55 12/03/2020     Lab Results   Component Value Date    LDLCALC 97 08/01/2022    LDLCALC 107 (H) 12/03/2021    LDLCALC 85 12/03/2020     Lab Results   Component Value Date    LABVLDL 29 08/01/2022    LABVLDL 24 12/03/2021    LABVLDL 22 12/03/2020     No results found for: [facility]    Lab Results   Component Value Date    INR 2.53 10/18/2019    INR 0.94 08/29/2013    INR 0.88 (L) 08/19/2013    PROTIME 25.4 10/18/2019    PROTIME 12.4 08/29/2013    PROTIME 11.8 08/19/2013       The 10-year ASCVD risk score (Nanda CARLIN, et al., 2019) is: 14.2%    Values used to calculate the score:      Age: 69 years      Sex: Male      Is Non- : No      Diabetic: No      Tobacco smoker: No      Systolic Blood Pressure: 432 mmHg      Is BP treated: Yes      HDL Cholesterol: 51 mg/dL      Total Cholesterol: 177 mg/dL      Imaging:       Last ECG (if available, Personally interpreted):  Sinus bradycardia, first-degree block    Last Monitor/Holter  [facility] Holter report: PAC, PVC, 3 episodes of SVT     Last Stress (if available):    Last Cath (if available):    Last TTE/GEREMIAS(if available): 7/1/19  Summary    There is evidence of right-to-left intracardiac shunting/patent foramen    ovale by agitated saline bubble contrast study. Normal left ventricular size, wall thickness & systolic function. Normal left ventricular inflow profile. There are no significant segmental wall motion abnormalities. The left ventricular ejection fraction is calculated at 75 %. Last CMR  (if available):    CTA: 10/11/22  Impression   Total calcium score of 167 is within the 40th percentile for the patient's   age of 68 y/o . No incidental clinically important extracardiac CT findings.            Assessment / Plan:     Intermittent atrial fibrillation (HCC)  Continue Eliquis and diltiazem. Follows with EP. Hyperlipidemia  Goal LDL of 55. Start Repatha. Significant side effects with the statins. Having some myalgias with Zetia. If he starts Repatha okay to discontinue Zetia. Recheck lipids in 3 to 6 months. Hypertension  Well-controlled on diltiazem    Follow up in 12 months. I had the opportunity to review the clinical symptoms and presentation of Dk Alba. Patient's allergies and medications were reviewed and updated. Patient's past medical, surgical, social and family history were reviewed and updated. Patient's testing including laboratory, ECGs, monitor, imaging (TTE,GEREMIAS,CMR,cath) were reviewed. All questions and concerns were addressed to the patient/family. Alternatives to my treatment were discussed. The note was completed using EMR. Every effort wasmade to ensure accuracy; however, inadvertent computerized transcription errors may be present. Thank you for allowing me to participate in thecare or Dk Rios MD, [facility], [facility]

## 2022-11-08 ENCOUNTER — NURSE ONLY (OUTPATIENT)
Dept: CARDIOLOGY CLINIC | Age: 67
End: 2022-11-08
Payer: COMMERCIAL

## 2022-11-08 DIAGNOSIS — I47.1 SVT (SUPRAVENTRICULAR TACHYCARDIA) (HCC): Primary | ICD-10-CM

## 2022-11-08 DIAGNOSIS — Z45.09 ENCOUNTER FOR LOOP RECORDER CHECK: ICD-10-CM

## 2022-11-08 PROCEDURE — G2066 INTER DEVC REMOTE 30D: HCPCS | Performed by: INTERNAL MEDICINE

## 2022-11-08 PROCEDURE — 93298 REM INTERROG DEV EVAL SCRMS: CPT | Performed by: INTERNAL MEDICINE

## 2022-11-08 NOTE — PROGRESS NOTES
We received a remote transmission from patient's monitor at home. Remote Linq report shows known AF. Pt is on Eliquis. EP physician to review. We will continue to monitor remotely. Implanted for stroke. End of 31-day monitoring period 11-8-22.

## 2022-11-09 ENCOUNTER — OFFICE VISIT (OUTPATIENT)
Dept: CARDIOLOGY CLINIC | Age: 67
End: 2022-11-09
Payer: COMMERCIAL

## 2022-11-09 VITALS
SYSTOLIC BLOOD PRESSURE: 110 MMHG | DIASTOLIC BLOOD PRESSURE: 60 MMHG | BODY MASS INDEX: 28.4 KG/M2 | WEIGHT: 203.6 LBS | HEART RATE: 78 BPM | OXYGEN SATURATION: 97 %

## 2022-11-09 DIAGNOSIS — I10 HYPERTENSION, UNSPECIFIED TYPE: ICD-10-CM

## 2022-11-09 DIAGNOSIS — I48.0 INTERMITTENT ATRIAL FIBRILLATION (HCC): ICD-10-CM

## 2022-11-09 DIAGNOSIS — E78.5 HYPERLIPIDEMIA, UNSPECIFIED HYPERLIPIDEMIA TYPE: ICD-10-CM

## 2022-11-09 PROCEDURE — 99213 OFFICE O/P EST LOW 20 MIN: CPT | Performed by: INTERNAL MEDICINE

## 2022-11-09 PROCEDURE — 3074F SYST BP LT 130 MM HG: CPT | Performed by: INTERNAL MEDICINE

## 2022-11-09 PROCEDURE — 1123F ACP DISCUSS/DSCN MKR DOCD: CPT | Performed by: INTERNAL MEDICINE

## 2022-11-09 PROCEDURE — 3078F DIAST BP <80 MM HG: CPT | Performed by: INTERNAL MEDICINE

## 2022-11-09 RX ORDER — TRIAMCINOLONE ACETONIDE 55 UG/1
2 SPRAY, METERED NASAL DAILY
COMMUNITY

## 2022-11-09 RX ORDER — AZELASTINE 1 MG/ML
1 SPRAY, METERED NASAL 2 TIMES DAILY
COMMUNITY

## 2022-11-09 NOTE — ASSESSMENT & PLAN NOTE
Goal LDL of 55. Start Repatha. Significant side effects with the statins. Having some myalgias with Zetia. If he starts Repatha okay to discontinue Zetia. Recheck lipids in 3 to 6 months.

## 2022-11-10 ENCOUNTER — TELEPHONE (OUTPATIENT)
Dept: CARDIOLOGY CLINIC | Age: 67
End: 2022-11-10

## 2022-11-10 NOTE — TELEPHONE ENCOUNTER
LVM to notify patient that PA for Shailesh Jones has been approved from 11/3/2022 through 22.08.2023. Patient's co-pay is $5.00 with the [employer] savings card .

## 2022-11-11 ENCOUNTER — OFFICE VISIT (OUTPATIENT)
Dept: CARDIOLOGY CLINIC | Age: 67
End: 2022-11-11
Payer: COMMERCIAL

## 2022-11-11 VITALS
DIASTOLIC BLOOD PRESSURE: 74 MMHG | HEIGHT: 71 IN | OXYGEN SATURATION: 97 % | SYSTOLIC BLOOD PRESSURE: 136 MMHG | BODY MASS INDEX: 28.28 KG/M2 | WEIGHT: 202 LBS | HEART RATE: 64 BPM

## 2022-11-11 DIAGNOSIS — I45.10 RBBB: ICD-10-CM

## 2022-11-11 DIAGNOSIS — I49.1 PAC (PREMATURE ATRIAL CONTRACTION): ICD-10-CM

## 2022-11-11 DIAGNOSIS — I10 HYPERTENSION, UNSPECIFIED TYPE: ICD-10-CM

## 2022-11-11 DIAGNOSIS — Q21.12 PFO (PATENT FORAMEN OVALE): ICD-10-CM

## 2022-11-11 DIAGNOSIS — I49.3 PVC (PREMATURE VENTRICULAR CONTRACTION): ICD-10-CM

## 2022-11-11 DIAGNOSIS — Z91.89 AT RISK FOR SLEEP APNEA: ICD-10-CM

## 2022-11-11 DIAGNOSIS — I47.1 SVT (SUPRAVENTRICULAR TACHYCARDIA) (HCC): Primary | ICD-10-CM

## 2022-11-11 PROCEDURE — 99213 OFFICE O/P EST LOW 20 MIN: CPT | Performed by: INTERNAL MEDICINE

## 2022-11-11 PROCEDURE — 1123F ACP DISCUSS/DSCN MKR DOCD: CPT | Performed by: INTERNAL MEDICINE

## 2022-11-11 PROCEDURE — 3078F DIAST BP <80 MM HG: CPT | Performed by: INTERNAL MEDICINE

## 2022-11-11 PROCEDURE — 3074F SYST BP LT 130 MM HG: CPT | Performed by: INTERNAL MEDICINE

## 2022-11-11 PROCEDURE — 93000 ELECTROCARDIOGRAM COMPLETE: CPT | Performed by: INTERNAL MEDICINE

## 2022-11-15 ENCOUNTER — TELEPHONE (OUTPATIENT)
Dept: CARDIOLOGY CLINIC | Age: 67
End: 2022-11-15

## 2022-11-15 NOTE — TELEPHONE ENCOUNTER
Spoke with patient, he called repatha ready and was only able to get patient assistance for so long. Patient does not want to pay that much for a medication. Should he try niacin and stain again? Can he try diet and exercise?

## 2022-11-15 NOTE — TELEPHONE ENCOUNTER
Patient is calling to report that the Repatha that was prescribed on Friday by Dr. Ella Tejada is too expensive. The total cost is going to be   $3900 after the discount card through [employer]. He also called his insurance company to find a cheaper price, however all of the pharmacies were around the same price. Please advise and call patient.

## 2022-11-17 ENCOUNTER — PATIENT MESSAGE (OUTPATIENT)
Dept: CARDIOLOGY CLINIC | Age: 67
End: 2022-11-17

## 2022-11-17 DIAGNOSIS — E78.5 HYPERLIPIDEMIA, UNSPECIFIED HYPERLIPIDEMIA TYPE: Primary | ICD-10-CM

## 2022-11-18 RX ORDER — EZETIMIBE 10 MG/1
10 TABLET ORAL DAILY
Qty: 90 TABLET | Refills: 3 | Status: SHIPPED | OUTPATIENT
Start: 2022-11-18

## 2022-11-18 RX ORDER — ROSUVASTATIN CALCIUM 5 MG/1
5 TABLET, COATED ORAL DAILY
COMMUNITY
End: 2022-11-18 | Stop reason: SDUPTHER

## 2022-11-18 RX ORDER — ROSUVASTATIN CALCIUM 5 MG/1
5 TABLET, COATED ORAL DAILY
Qty: 90 TABLET | Refills: 3 | Status: SHIPPED | OUTPATIENT
Start: 2022-11-18

## 2022-11-18 NOTE — TELEPHONE ENCOUNTER
Requested Prescriptions     Pending Prescriptions Disp Refills    rosuvastatin (CRESTOR) 5 MG tablet 90 tablet 3     Sig: Take 1 tablet by mouth daily    ezetimibe (ZETIA) 10 MG tablet 90 tablet 3     Sig: Take 1 tablet by mouth daily            Last Office Visit: 11/9/2022     Next Office Visit: 11.07.2023

## 2022-11-28 ENCOUNTER — TELEPHONE (OUTPATIENT)
Dept: CARDIOLOGY CLINIC | Age: 67
End: 2022-11-28

## 2022-11-28 DIAGNOSIS — I48.0 INTERMITTENT ATRIAL FIBRILLATION (HCC): ICD-10-CM

## 2022-11-28 DIAGNOSIS — R73.9 HYPERGLYCEMIA: ICD-10-CM

## 2022-11-28 DIAGNOSIS — I49.3 PVC (PREMATURE VENTRICULAR CONTRACTION): ICD-10-CM

## 2022-11-28 DIAGNOSIS — Z12.5 PROSTATE CANCER SCREENING: ICD-10-CM

## 2022-11-28 DIAGNOSIS — E78.2 MIXED HYPERLIPIDEMIA: Primary | ICD-10-CM

## 2022-11-28 DIAGNOSIS — I10 HYPERTENSION, UNSPECIFIED TYPE: ICD-10-CM

## 2022-11-28 NOTE — TELEPHONE ENCOUNTER
Med Refill  CHANGE in Pharmacy due to change in insurance   Would like 90 day supply     ELIQUIS 5 MG TABS tablet   As Directed  180 tablet  Requesting 1 year supply  TAKE 1 TABLET BY MOUTH  TWICE DAILY    [facility] inside [employer], at [address] [facility], [address]

## 2022-11-29 ENCOUNTER — OFFICE VISIT (OUTPATIENT)
Dept: DERMATOLOGY | Age: 67
End: 2022-11-29
Payer: COMMERCIAL

## 2022-11-29 DIAGNOSIS — D22.9 MULTIPLE NEVI: ICD-10-CM

## 2022-11-29 DIAGNOSIS — Z85.828 HISTORY OF NONMELANOMA SKIN CANCER: Primary | ICD-10-CM

## 2022-11-29 DIAGNOSIS — L57.0 AK (ACTINIC KERATOSIS): ICD-10-CM

## 2022-11-29 DIAGNOSIS — D17.1 LIPOMA OF CHEST WALL: ICD-10-CM

## 2022-11-29 DIAGNOSIS — L82.1 SEBORRHEIC KERATOSIS: ICD-10-CM

## 2022-11-29 DIAGNOSIS — D23.9 DERMATOFIBROMA: ICD-10-CM

## 2022-11-29 DIAGNOSIS — D48.5 NEOPLASM OF UNCERTAIN BEHAVIOR OF SKIN: ICD-10-CM

## 2022-11-29 PROCEDURE — 11102 TANGNTL BX SKIN SINGLE LES: CPT | Performed by: DERMATOLOGY

## 2022-11-29 PROCEDURE — 1123F ACP DISCUSS/DSCN MKR DOCD: CPT | Performed by: DERMATOLOGY

## 2022-11-29 PROCEDURE — 99213 OFFICE O/P EST LOW 20 MIN: CPT | Performed by: DERMATOLOGY

## 2022-11-29 NOTE — PROGRESS NOTES
[facility]  Dusty Cintron MD  [phone]      Dk Alba  1955    69 y.o. male     Date of Visit: 11/29/2022    Chief Complaint: f/u skin cancer, lesions  Chief Complaint   Patient presents with    Skin Exam    Other     Please remove kenalog injections from med list     Last seen:   *had a TIA in the summer 2019 - on plavix now  *went to [facility] FOR REHABILITATION - 39 yr reunion cancelled summer 2020    History of Present Illness:    1. Here for f/u for skin cancer. S/p Mohs 6-2015 for nod BCC on the R nasal sidewall and then scar revision. No probs with this site - scar looks great and no pain, no bleeding, no concerns. 2. Here for a skin check for evaluation of multiple asx moles on the trunk and extremities, present for many years; no change in size/shape/color of any lesions; no bleeding lesions. 3. Hx of AK's -no new concerns. S/p 5-FU for R nasal dorsum AK (bx'd  - read as HAK, transected at deep margin). No probs since. 4. He has a firm papule on the L popliteal area. Asymptomatic. 5.  Concerning pigmented lesion noticed on exam today - L posterior shoulder/upper back. Asx.     6. F/u lipoma - asx mass on the L upper chest/shoulder - noticed in 2021- saw PCP and diagnosed with lipoma. 7. He notes intermittent itchy bumps on the central lower back. Seems to flare at night. No trx tried. L clavicular area - bx  - read as squamous acanthoma    Previously discussed flushing with niacin. No personal or family hx of skin cancer. Review of Systems:  Gen: Feels well, good sense of health. Skin: No changing moles or lesions. No new rashes. Past Medical History, Family History, Surgical History, Medications and Allergies reviewed.     Past Medical History:   Diagnosis Date    Basal cell carcinoma of right nasal sidewall 6/4/2015    BPH (benign prostatic hyperplasia) 10/18/2010    Colon, diverticulosis 12/24/2018    Hyperlipidemia 10/18/2010 Hypertension 3/22/2021    Intermittent atrial fibrillation ([address] 10/13/2020    Nephrolithiasis     s/p lithotripsy x 4, calcium oxalate. PFO (patent foramen ovale)     Raynaud's disease 12/4/2014    Wears glasses        Past Surgical History:   Procedure Laterality Date    COLONOSCOPY  2007    q 10,5/2018    COLONOSCOPY  2018    repeat q 10-diverticulosis    CYSTOSCOPY  11/14/2013     CYSTOSCOPY TRANSURETHRAL RESECTION PROSTATE    HERNIA REPAIR      KIDNEY STONE SURGERY Left 08/29/2013    perc. nephrolithotomy,stint placement    MOHS SURGERY  06/2015    LUC-Dr. Iban Coronado rt nasal sidewall    OTHER SURGICAL HISTORY      perirectal abscess 1992    PROSTATE SURGERY  04/2005    biopsy       Outpatient Medications Marked as Taking for the 11/29/22 encounter (Office Visit) with Heather Sarah MD   Medication Sig Dispense Refill    apixaban (ELIQUIS) 5 MG TABS tablet TAKE 1 TABLET BY MOUTH  TWICE DAILY 180 tablet 3    rosuvastatin (CRESTOR) 5 MG tablet Take 1 tablet by mouth daily 90 tablet 3    ezetimibe (ZETIA) 10 MG tablet Take 1 tablet by mouth daily 90 tablet 3    azelastine (ASTELIN) 0.1 % nasal spray 1 spray by Nasal route 2 times daily Use in each nostril as directed      triamcinolone (NASACORT) 55 MCG/ACT nasal inhaler 2 sprays by Each Nostril route daily      dilTIAZem (CARDIZEM CD) 180 MG extended release capsule Take 1 capsule by mouth daily 90 capsule 3    losartan (COZAAR) 100 MG tablet TAKE 1 TABLET DAILY 90 tablet 3    Coenzyme Q-10 200 MG CAPS       chlorpheniramine (CHLOR-TRIMETON) 4 MG tablet Take 4 mg by mouth every 6 hours as needed for Allergies      hydrocortisone (WESTCORT) 0.2 % cream Apply topically 2 times daily. 30 g 2    Multiple Vitamins-Minerals (MULTI FOR HIM 50+) TABS Take  by mouth.            Allergies   Allergen Reactions    Crestor [Rosuvastatin] Other (See Comments)     Muscle cramps    Percocet [Oxycodone-Acetaminophen] Other (See Comments)     constipation         Physical

## 2022-12-01 DIAGNOSIS — E78.5 HYPERLIPIDEMIA, UNSPECIFIED HYPERLIPIDEMIA TYPE: ICD-10-CM

## 2022-12-01 DIAGNOSIS — E78.2 MIXED HYPERLIPIDEMIA: ICD-10-CM

## 2022-12-01 DIAGNOSIS — I49.3 PVC (PREMATURE VENTRICULAR CONTRACTION): ICD-10-CM

## 2022-12-01 DIAGNOSIS — R73.9 HYPERGLYCEMIA: ICD-10-CM

## 2022-12-01 DIAGNOSIS — Z12.5 PROSTATE CANCER SCREENING: ICD-10-CM

## 2022-12-01 DIAGNOSIS — I10 HYPERTENSION, UNSPECIFIED TYPE: ICD-10-CM

## 2022-12-01 LAB
A/G RATIO: 2.2 (ref 1.1–2.2)
ALBUMIN SERPL-MCNC: 4.3 G/DL (ref 3.4–5)
ALP BLD-CCNC: 107 U/L (ref 40–129)
ALT SERPL-CCNC: 25 U/L (ref 10–40)
ANION GAP SERPL CALCULATED.3IONS-SCNC: 11 MMOL/L (ref 3–16)
AST SERPL-CCNC: 26 U/L (ref 15–37)
BASOPHILS ABSOLUTE: 0 K/UL (ref 0–0.2)
BASOPHILS RELATIVE PERCENT: 0.5 %
BILIRUB SERPL-MCNC: 0.5 MG/DL (ref 0–1)
BUN BLDV-MCNC: 21 MG/DL (ref 7–20)
C-REACTIVE PROTEIN: <3 MG/L (ref 0–5.1)
CALCIUM SERPL-MCNC: 9.7 MG/DL (ref 8.3–10.6)
CHLORIDE BLD-SCNC: 100 MMOL/L (ref 99–110)
CHOLESTEROL, TOTAL: 139 MG/DL (ref 0–199)
CO2: 26 MMOL/L (ref 21–32)
CREAT SERPL-MCNC: 1.1 MG/DL (ref 0.8–1.3)
DERMATOLOGY PATHOLOGY REPORT: NORMAL
EOSINOPHILS ABSOLUTE: 0.1 K/UL (ref 0–0.6)
EOSINOPHILS RELATIVE PERCENT: 1.2 %
GFR SERPL CREATININE-BSD FRML MDRD: >60 ML/MIN/{1.73_M2}
GLUCOSE BLD-MCNC: 94 MG/DL (ref 70–99)
HCT VFR BLD CALC: 49.3 % (ref 40.5–52.5)
HDLC SERPL-MCNC: 48 MG/DL (ref 40–60)
HEMOGLOBIN: 16 G/DL (ref 13.5–17.5)
LDL CHOLESTEROL CALCULATED: 74 MG/DL
LYMPHOCYTES ABSOLUTE: 1.5 K/UL (ref 1–5.1)
LYMPHOCYTES RELATIVE PERCENT: 21.8 %
MCH RBC QN AUTO: 29.6 PG (ref 26–34)
MCHC RBC AUTO-ENTMCNC: 32.6 G/DL (ref 31–36)
MCV RBC AUTO: 90.9 FL (ref 80–100)
MONOCYTES ABSOLUTE: 0.9 K/UL (ref 0–1.3)
MONOCYTES RELATIVE PERCENT: 13.6 %
NEUTROPHILS ABSOLUTE: 4.4 K/UL (ref 1.7–7.7)
NEUTROPHILS RELATIVE PERCENT: 62.9 %
PDW BLD-RTO: 13.7 % (ref 12.4–15.4)
PLATELET # BLD: 205 K/UL (ref 135–450)
PMV BLD AUTO: 8.8 FL (ref 5–10.5)
POTASSIUM SERPL-SCNC: 4.4 MMOL/L (ref 3.5–5.1)
PROSTATE SPECIFIC ANTIGEN: 6.72 NG/ML (ref 0–4)
RBC # BLD: 5.42 M/UL (ref 4.2–5.9)
SODIUM BLD-SCNC: 137 MMOL/L (ref 136–145)
TOTAL PROTEIN: 6.3 G/DL (ref 6.4–8.2)
TRIGL SERPL-MCNC: 87 MG/DL (ref 0–150)
VLDLC SERPL CALC-MCNC: 17 MG/DL
WBC # BLD: 6.9 K/UL (ref 4–11)

## 2022-12-02 LAB
ESTIMATED AVERAGE GLUCOSE: 108.3 MG/DL
HBA1C MFR BLD: 5.4 %

## 2022-12-12 ENCOUNTER — OFFICE VISIT (OUTPATIENT)
Dept: FAMILY MEDICINE CLINIC | Age: 67
End: 2022-12-12
Payer: COMMERCIAL

## 2022-12-12 VITALS
WEIGHT: 199 LBS | OXYGEN SATURATION: 97 % | DIASTOLIC BLOOD PRESSURE: 82 MMHG | HEART RATE: 76 BPM | SYSTOLIC BLOOD PRESSURE: 126 MMHG | BODY MASS INDEX: 27.75 KG/M2 | TEMPERATURE: 97.5 F

## 2022-12-12 DIAGNOSIS — I47.1 SVT (SUPRAVENTRICULAR TACHYCARDIA) (HCC): ICD-10-CM

## 2022-12-12 DIAGNOSIS — E78.5 HYPERLIPIDEMIA, UNSPECIFIED HYPERLIPIDEMIA TYPE: ICD-10-CM

## 2022-12-12 DIAGNOSIS — I10 HYPERTENSION, UNSPECIFIED TYPE: ICD-10-CM

## 2022-12-12 DIAGNOSIS — R97.20 PSA ELEVATION: ICD-10-CM

## 2022-12-12 DIAGNOSIS — Z00.00 MEDICARE ANNUAL WELLNESS VISIT, SUBSEQUENT: Primary | ICD-10-CM

## 2022-12-12 DIAGNOSIS — I48.0 INTERMITTENT ATRIAL FIBRILLATION (HCC): ICD-10-CM

## 2022-12-12 PROCEDURE — 3078F DIAST BP <80 MM HG: CPT | Performed by: FAMILY MEDICINE

## 2022-12-12 PROCEDURE — G0439 PPPS, SUBSEQ VISIT: HCPCS | Performed by: FAMILY MEDICINE

## 2022-12-12 PROCEDURE — 1123F ACP DISCUSS/DSCN MKR DOCD: CPT | Performed by: FAMILY MEDICINE

## 2022-12-12 PROCEDURE — 3074F SYST BP LT 130 MM HG: CPT | Performed by: FAMILY MEDICINE

## 2022-12-12 ASSESSMENT — PATIENT HEALTH QUESTIONNAIRE - PHQ9
SUM OF ALL RESPONSES TO PHQ9 QUESTIONS 1 & 2: 0
SUM OF ALL RESPONSES TO PHQ QUESTIONS 1-9: 0
1. LITTLE INTEREST OR PLEASURE IN DOING THINGS: 0
SUM OF ALL RESPONSES TO PHQ QUESTIONS 1-9: 0
2. FEELING DOWN, DEPRESSED OR HOPELESS: 0

## 2022-12-12 NOTE — PROGRESS NOTES
Medicare Annual Wellness Visit    Dk Alba is here for Annual Exam    Assessment & Plan   PSA elevation  Assessment & Plan:   will see urology soon  SVT (supraventricular tachycardia) ([facility] [address] [facility]  Assessment & Plan:   Well-controlled, continue current medications  Hypertension, unspecified type  Assessment & Plan:   Well-controlled, continue current medications  Hyperlipidemia, unspecified hyperlipidemia type  Assessment & Plan:   Well-controlled, continue current medications--has had cramps  Intermittent atrial fibrillation (HCC)  Assessment & Plan:   Well-controlled, continue current medications    Recommendations for Preventive Services Due: see orders and patient instructions/AVS.  Recommended screening schedule for the next 5-10 years is provided to the patient in written form: see Patient Instructions/AVS.     No follow-ups on file. Subjective   The following acute and/or chronic problems were also addressed today:  PSA elevation   will see urology soon    SVT (supraventricular tachycardia) ([facility] [address]   Well-controlled, continue current medications    Hypertension   Well-controlled, continue current medications    Hyperlipidemia   Well-controlled, continue current medications--has had cramps    Intermittent atrial fibrillation (HCC)   Well-controlled, continue current medications      Patient's complete Health Risk Assessment and screening values have been reviewed and are found in Flowsheets. The following problems were reviewed today and where indicated follow up appointments were made and/or referrals ordered.     Positive Risk Factor Screenings with Interventions:    Fall Risk:  Do you feel unsteady or are you worried about falling? : (!) yes (A little balance issues)  2 or more falls in past year?: no  Fall with injury in past year?: no     Interventions:    discussed  See AVS for additional education material  See A/P for plan and any pertinent orders                                    Objective   Vitals: 12/12/22 1047   BP: 126/82   Site: Left Upper Arm   Position: Sitting   Cuff Size: Medium Adult   Pulse: 76   Temp: 97.5 °F (36.4 °C)   SpO2: 97%   Weight: 199 lb (90.3 kg)      Body mass index is 27.75 kg/m². Physical Exam  General Appearance: alert and oriented to person, place and time, well developed and well- nourished, in no acute distress  Skin: warm and dry, no rash or erythema  Head: normocephalic and atraumatic  Eyes: pupils equal, round, and reactive to light, extraocular eye movements intact, conjunctivae normal  ENT: tympanic membrane, external ear and ear canal normal bilaterally, nose without deformity, nasal mucosa and turbinates normal without polyps  Neck: supple and non-tender without mass, no thyromegaly or thyroid nodules, no cervical lymphadenopathy  Pulmonary/Chest: clear to auscultation bilaterally- no wheezes, rales or rhonchi, normal air movement, no respiratory distress  Cardiovascular: normal rate, regular rhythm, normal S1 and S2, no murmurs, rubs, clicks, or gallops, distal pulses intact, no carotid bruits  Abdomen: soft, non-tender, non-distended, normal bowel sounds, no masses or organomegaly  Extremities: no cyanosis, clubbing or edema  Musculoskeletal: normal range of motion, no joint swelling, deformity or tenderness  Neurologic: reflexes normal and symmetric, no cranial nerve deficit, gait, coordination and speech normal       Allergies   Allergen Reactions    Crestor [Rosuvastatin] Other (See Comments)     Muscle cramps    Percocet [Oxycodone-Acetaminophen] Other (See Comments)     constipation     Prior to Visit Medications    Medication Sig Taking?  Authorizing Provider   apixaban (ELIQUIS) 5 MG TABS tablet TAKE 1 TABLET BY MOUTH  TWICE DAILY Yes YVETTE West - CNP   rosuvastatin (CRESTOR) 5 MG tablet Take 1 tablet by mouth daily Yes Marta Hong MD   ezetimibe (ZETIA) 10 MG tablet Take 1 tablet by mouth daily Yes Marta Hong MD   azelastine (ASTELIN) 0.1 % nasal spray 1 spray by Nasal route 2 times daily Use in each nostril as directed Yes Historical Provider, MD   triamcinolone (NASACORT) 55 MCG/ACT nasal inhaler 2 sprays by Each Nostril route daily Yes Historical Provider, MD   dilTIAZem (CARDIZEM CD) 180 MG extended release capsule Take 1 capsule by mouth daily Yes Chanetta Severance, MD   losartan (COZAAR) 100 MG tablet TAKE 1 TABLET DAILY Yes Bertha Coleman MD   Coenzyme Q-10 200 MG CAPS  Yes Historical Provider, MD   IPRATROPIUM BROMIDE NA by Nasal route as needed Yes Historical Provider, MD   chlorpheniramine (CHLOR-TRIMETON) 4 MG tablet Take 4 mg by mouth every 6 hours as needed for Allergies Yes Historical Provider, MD   hydrocortisone (WESTCORT) 0.2 % cream Apply topically 2 times daily. Yes Yuliana Mcleod MD   Multiple Vitamins-Minerals [facility] FOR HIM 50+) TABS Take  by mouth.    Yes Historical Provider, MD Avalos (Including outside providers/suppliers regularly involved in providing care):   Patient Care Team:  Yuliana Mcleod MD as PCP - Aneesh Cagle MD as PCP - [facility] Empaneled Provider  Marie Foreman MD as Consulting Physician (Urology)  Jazmín Cruz MD as Consulting Physician (Otolaryngology)  Lima Petty MD as Consulting Physician (Otolaryngology)     Reviewed and updated this visit:  Allergies  Meds

## 2022-12-12 NOTE — PATIENT INSTRUCTIONS
Preventing Falls: Care Instructions  Overview     Getting around your home safely can be a challenge if you have injuries or health problems that make it easy for you to fall. Loose rugs and furniture in walkways are among the dangers for many older people who have problems walking or who have poor eyesight. People who have conditions such as arthritis, osteoporosis, or dementia also have to be careful not to fall. You can make your home safer with a few simple measures. Follow-up care is a key part of your treatment and safety. Be sure to make and go to all appointments, and call your doctor if you are having problems. It's also a good idea to know your test results and keep a list of the medicines you take. How can you care for yourself at home? Taking care of yourself  Exercise regularly to improve your strength, muscle tone, and balance. Walk if you can. Swimming may be a good choice if you cannot walk easily. Have your vision and hearing checked each year or any time you notice a change. If you have trouble seeing and hearing, you might not be able to avoid objects and could lose your balance. Know the side effects of the medicines you take. Ask your doctor or pharmacist whether the medicines you take can affect your balance. Sleeping pills or sedatives can affect your balance. Limit the amount of alcohol you drink. Alcohol can impair your balance and other senses. Ask your doctor whether calluses or corns on your feet need to be removed. If you wear loose-fitting shoes because of calluses or corns, you can lose your balance and fall. Talk to your doctor if you have numbness in your feet. You may get dizzy if you do not drink enough water. To prevent dehydration, drink plenty of fluids. Choose water and other clear liquids. If you have kidney, heart, or liver disease and have to limit fluids, talk with your doctor before you increase the amount of fluids you drink.   Preventing falls at home  Remove raised doorway thresholds, throw rugs, and clutter. Repair loose carpet or raised areas in the floor. Move furniture and electrical cords to keep them out of walking paths. Use nonskid floor wax, and wipe up spills right away, especially on ceramic tile floors. If you use a walker or cane, put rubber tips on it. If you use crutches, clean the bottoms of them regularly with an abrasive pad, such as steel wool. Keep your house well lit, especially stairways, porches, and outside walkways. Use night-lights in areas such as hallways and bathrooms. Add extra light switches or use remote switches (such as switches that go on or off when you clap your hands) to make it easier to turn lights on if you have to get up during the night. Install sturdy handrails on stairways. Move items in your cabinets so that the things you use a lot are on the lower shelves (about waist level). Keep a cordless phone and a flashlight with new batteries by your bed. If possible, put a phone in each of the main rooms of your house, or carry a cell phone in case you fall and cannot reach a phone. Or, you can wear a device around your neck or wrist. You push a button that sends a signal for help. Wear low-heeled shoes that fit well and give your feet good support. Use footwear with nonskid soles. Check the heels and soles of your shoes for wear. Repair or replace worn heels or soles. Do not wear socks without shoes on smooth floors, such as wood. Walk on the grass when the sidewalks are slippery. If you live in an area that gets snow and ice in the winter, sprinkle salt on slippery steps and sidewalks. Or ask a family member or friend to do this for you. Preventing falls in the bath  Install grab bars and nonskid mats inside and outside your shower or tub and near the toilet and sinks. Use shower chairs and bath benches. Use a hand-held shower head that will allow you to sit while showering.   Get into a tub or shower by putting the weaker leg in first. Get out of a tub or shower with your strong side first.  Repair loose toilet seats and consider installing a raised toilet seat to make getting on and off the toilet easier. Keep your bathroom door unlocked while you are in the shower. Where can you learn more? Go to http://www.lara.com/ and enter G117 to learn more about \"Preventing Falls: Care Instructions. \"  Current as of: May 4, 2022               Content Version: 13.5  © 6068-3919 Healthwise, Incorporated. Care instructions adapted under license by [facility] ([facility]). If you have questions about a medical condition or this instruction, always ask your healthcare professional. [address] any warranty or liability for your use of this information. Hearing Loss: Care Instructions  Overview     Hearing loss is a sudden or slow decrease in how well you hear. It can range from mild to severe. Permanent hearing loss can occur with aging. It also can happen when you are exposed long-term to loud noise. Examples include listening to loud music, riding motorcycles, or being around other loud machines. Hearing loss can affect your work and home life. It can make you feel lonely or depressed. You may feel that you have lost your independence. But hearing aids and other devices can help you hear better and feel connected to others. Follow-up care is a key part of your treatment and safety. Be sure to make and go to all appointments, and call your doctor if you are having problems. It's also a good idea to know your test results and keep a list of the medicines you take. How can you care for yourself at home? Avoid loud noises whenever possible. This helps keep your hearing from getting worse. Always wear hearing protection around loud noises. Wear a hearing aid as directed. See a professional who can help you pick a hearing aid that fits you. Have hearing tests as your doctor suggests. They can show whether your hearing has changed. Your hearing aid may need to be adjusted. Use other devices as needed. These may include:  Telephone amplifiers and hearing aids that can connect to a television, stereo, radio, or microphone. Devices that use lights or vibrations. These alert you to the doorbell, a ringing telephone, or a baby monitor. Television closed-captioning. This shows the words at the bottom of the screen. Most new TVs can do this. TTY (text telephone). This lets you type messages back and forth on the telephone instead of talking or listening. These devices are also called TDD. When messages are typed on the keyboard, they are sent over the phone line to a receiving TTY. The message is shown on a monitor. Use text messaging, social media, and email if it is hard for you to communicate by telephone. Try to learn a listening technique called speechreading. It is not lipreading. You pay attention to people's gestures, expressions, posture, and tone of voice. These clues can help you understand what a person is saying. Face the person you are talking to, and have them face you. Make sure the lighting is good. You need to see the other person's face clearly. Think about counseling if you need help to adjust to your hearing loss. When should you call for help? Watch closely for changes in your health, and be sure to contact your doctor if:    You think your hearing is getting worse.     You have new symptoms, such as dizziness or nausea. Where can you learn more? Go to http://www.damntheradio.com/ and enter R798 to learn more about \"Hearing Loss: Care Instructions. \"  Current as of: May 4, 2022               Content Version: 13.5  © 5810-6608 Healthwise, Incorporated. Care instructions adapted under license by [facility] ([facility]).  If you have questions about a medical condition or this instruction, always ask your healthcare professional. Selena Voss any warranty or liability for your use of this information. Advance Directives: Care Instructions  Overview  An advance directive is a legal way to state your wishes at the end of your life. It tells your family and your doctor what to do if you can't say what you want. There are two main types of advance directives. You can change them any time your wishes change. Living will. This form tells your family and your doctor your wishes about life support and other treatment. The form is also called a declaration. Medical power of . This form lets you name a person to make treatment decisions for you when you can't speak for yourself. This person is called a health care agent (health care proxy, health care surrogate). The form is also called a durable power of  for health care. If you do not have an advance directive, decisions about your medical care may be made by a family member, or by a doctor or a  who doesn't know you. It may help to think of an advance directive as a gift to the people who care for you. If you have one, they won't have to make tough decisions by themselves. For more information, including forms for your state, see the [address] website (www.caringinfo.org/planning/advance-directives/). Follow-up care is a key part of your treatment and safety. Be sure to make and go to all appointments, and call your doctor if you are having problems. It's also a good idea to know your test results and keep a list of the medicines you take. What should you include in an advance directive? Many states have a unique advance directive form. (It may ask you to address specific issues.) Or you might use a universal form that's approved by many states. If your form doesn't tell you what to address, it may be hard to know what to include in your advance directive. Use the questions below to help you get started.   Who do you want to make decisions about your medical care if you are not able to? What life-support measures do you want if you have a serious illness that gets worse over time or can't be cured? What are you most afraid of that might happen? (Maybe you're afraid of having pain, losing your independence, or being kept alive by machines.)  Where would you prefer to die? (Your home? A hospital? A nursing home?)  Do you want to donate your organs when you die? Do you want certain Congregation practices performed before you die? When should you call for help? Be sure to contact your doctor if you have any questions. Where can you learn more? Go to http://www.lara.com/ and enter R264 to learn more about \"Advance Directives: Care Instructions. \"  Current as of: June 16, 2022               Content Version: 13.5  © 7903-9832 Healthwise, Incorporated. Care instructions adapted under license by [facility] ([facility]). If you have questions about a medical condition or this instruction, always ask your healthcare professional. [place] [address] any warranty or liability for your use of this information. Personalized Preventive Plan for Dk Alba - 12/12/2022  Medicare offers a range of preventive health benefits. Some of the tests and screenings are paid in full while other may be subject to a deductible, co-insurance, and/or copay. Some of these benefits include a comprehensive review of your medical history including lifestyle, illnesses that may run in your family, and various assessments and screenings as appropriate. After reviewing your medical record and screening and assessments performed today your provider may have ordered immunizations, labs, imaging, and/or referrals for you. A list of these orders (if applicable) as well as your Preventive Care list are included within your After Visit Summary for your review.     Other Preventive Recommendations:    A preventive eye exam performed by an eye specialist is recommended every 1-2 years to screen for glaucoma; cataracts, macular degeneration, and other eye disorders. A preventive dental visit is recommended every 6 months. Try to get at least 150 minutes of exercise per week or 10,000 steps per day on a pedometer . Order or download the FREE \"Exercise & Physical Activity: Your Everyday Guide\" from The [employer]. Call 3-488.320.3887 or search [employer] online. You need 6965-0058 mg of calcium and 9655-5298 IU of vitamin D per day. It is possible to meet your calcium requirement with diet alone, but a vitamin D supplement is usually necessary to meet this goal.  When exposed to the sun, use a sunscreen that protects against both UVA and UVB radiation with an SPF of 30 or greater. Reapply every 2 to 3 hours or after sweating, drying off with a towel, or swimming. Always wear a seat belt when traveling in a car. Always wear a helmet when riding a bicycle or motorcycle.

## 2022-12-15 ENCOUNTER — TELEPHONE (OUTPATIENT)
Dept: CARDIOLOGY CLINIC | Age: 67
End: 2022-12-15

## 2022-12-15 NOTE — TELEPHONE ENCOUNTER
Tried to send a manual remote transmission. The battery in the handset is dead. Patient called [employer]. They will ship a new handset. Will take 7-10 business days. Will send a transmissiion at that time.   niall

## 2022-12-19 ENCOUNTER — NURSE ONLY (OUTPATIENT)
Dept: CARDIOLOGY CLINIC | Age: 67
End: 2022-12-19
Payer: COMMERCIAL

## 2022-12-19 ENCOUNTER — TELEPHONE (OUTPATIENT)
Dept: CARDIOLOGY CLINIC | Age: 67
End: 2022-12-19

## 2022-12-19 DIAGNOSIS — Z45.09 ENCOUNTER FOR LOOP RECORDER CHECK: ICD-10-CM

## 2022-12-19 DIAGNOSIS — I47.1 SVT (SUPRAVENTRICULAR TACHYCARDIA) (HCC): Primary | ICD-10-CM

## 2022-12-19 PROCEDURE — 93298 REM INTERROG DEV EVAL SCRMS: CPT | Performed by: INTERNAL MEDICINE

## 2022-12-19 PROCEDURE — G2066 INTER DEVC REMOTE 30D: HCPCS | Performed by: INTERNAL MEDICINE

## 2022-12-19 NOTE — TELEPHONE ENCOUNTER
SERA. Patient received your new device equipment from [address] [place] [address]    Patient sent a remote phone transmission today around 3pm.  niall

## 2022-12-20 NOTE — PROGRESS NOTES
We received a remote transmission from patient's monitor at home. Remote Linq report shows known AF and SVT. Pt is on Eliquis and Cardizem. EP physician to review. We will continue to monitor remotely. Implanted for stroke. End of 31-day monitoring period 12-19-22.

## 2022-12-23 NOTE — PATIENT INSTRUCTIONS
Call if your want to change your blood thinner or need another discount card.
Patient/Caregiver provided printed discharge information.

## 2022-12-31 ENCOUNTER — PATIENT MESSAGE (OUTPATIENT)
Dept: CARDIOLOGY CLINIC | Age: 67
End: 2022-12-31

## 2023-01-03 RX ORDER — ROSUVASTATIN CALCIUM 5 MG/1
5 TABLET, COATED ORAL DAILY
Qty: 90 TABLET | Refills: 3 | Status: SHIPPED | OUTPATIENT
Start: 2023-01-03

## 2023-01-03 NOTE — TELEPHONE ENCOUNTER
From: Dk Alba  To: Dr. Sharonda Garrido: 12/31/2022 7:35 PM EST  Subject: 90-day Prescription for Rosuvastatin to [facility],     I currently have 50-days' worth of Rosuvastatin 5 mg (taking once daily) remaining. 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 I would like to get ahead of my prescription needs, so I don't wind up in the same situation as the last time I switched Health Plans, nearly running out of medication before I got the credentials for my new Health Plan so I could order refills. (It takes about 30-45 days to \"jump through all the hoops,\" to make the transition.)     Please provide me with one more 90-day prescription for Rosuvastatin 5 mg, and send this one to [address] (my long-term prescription pharmacy), not [facility], so I can weather the transition back to [address] [facility] without running out of medication. I start back to work at [employer] on January 9, 2023, so I need to get this prescription filled before that date (while I am still on my current Health Plan).     Thanks,  Niki Alba

## 2023-01-23 ENCOUNTER — NURSE ONLY (OUTPATIENT)
Dept: CARDIOLOGY CLINIC | Age: 68
End: 2023-01-23
Payer: COMMERCIAL

## 2023-01-23 DIAGNOSIS — I47.1 SVT (SUPRAVENTRICULAR TACHYCARDIA) (HCC): Primary | ICD-10-CM

## 2023-01-23 DIAGNOSIS — Z45.09 ENCOUNTER FOR LOOP RECORDER CHECK: ICD-10-CM

## 2023-01-23 PROCEDURE — G2066 INTER DEVC REMOTE 30D: HCPCS | Performed by: INTERNAL MEDICINE

## 2023-01-23 PROCEDURE — 93298 REM INTERROG DEV EVAL SCRMS: CPT | Performed by: INTERNAL MEDICINE

## 2023-01-23 NOTE — PROGRESS NOTES
We received a remote transmission from patient's monitor at home. Remote Linq report shows known AF with RVR. Pt is on Eliquis and Cardizem. Pt has reached the BHAVESH. Message sent to office staff. EP physician to review. We will continue to monitor remotely. Implanted for stroke. End of 31-day monitoring period 1-23-23.

## 2023-02-14 ENCOUNTER — TELEPHONE (OUTPATIENT)
Dept: FAMILY MEDICINE CLINIC | Age: 68
End: 2023-02-14

## 2023-02-14 NOTE — TELEPHONE ENCOUNTER
Pt called and stated that his lab work was not coded correctly per Lorrie Bermudez. There are 5 different CPT codes pt says should have been coded preventative.     Can Dr Kerri Mitchell correct the CPT codes per pt?

## 2023-02-27 ENCOUNTER — TELEPHONE (OUTPATIENT)
Dept: CARDIOLOGY CLINIC | Age: 68
End: 2023-02-27

## 2023-02-27 NOTE — TELEPHONE ENCOUNTER
PT was told by doctor to get permission to stop Eliquis 3 days prior to his Prostate Biopsy and resume after procedure. Please advise pt.

## 2023-03-02 ENCOUNTER — TELEPHONE (OUTPATIENT)
Dept: CARDIOLOGY CLINIC | Age: 68
End: 2023-03-02

## 2023-03-02 NOTE — TELEPHONE ENCOUNTER
Pt called to speak w/npsr to discuss that he needs 3 days and not 2 days as npsr has approved for him to hold Eliquis for his Biopsy on 03/10. Please call to discuss.   Please advise

## 2023-03-13 ENCOUNTER — TELEPHONE (OUTPATIENT)
Dept: CARDIOLOGY CLINIC | Age: 68
End: 2023-03-13

## 2023-03-13 NOTE — TELEPHONE ENCOUNTER
Patient would like to know why remote transmissions were canceled and when the next remote transmission will be scheduled. Please call and advise. Thanks.   niall

## 2023-03-14 NOTE — TELEPHONE ENCOUNTER
Patient has Med[employer]onic Medtronic LNQ11 Reveal LINQ implanted on 10/22/2019 for TIA. Device reached RRT on 12/29/2022.     Last remote transmission 1/23/2023. Per ISMA Damon- PER CMV - does not need to be replaced.    Device is now at EOS and is no longer transmitting. Unable to follow remotely.   Patient can unplug Home Monitor and bring it in at next OV and we can recycle it back to [employer].     Next appointment scheduled to see Dr. Pearce on 5/10/2023.    Please advise. Thanks

## 2023-03-14 NOTE — TELEPHONE ENCOUNTER
His loop recorder battery has  that is why it is no longer transmitting.  Options can be discussed at upcoming appointment with YVETTE Zamorano-CNP

## 2023-03-22 ENCOUNTER — PATIENT MESSAGE (OUTPATIENT)
Dept: CARDIOLOGY CLINIC | Age: 68
End: 2023-03-22

## 2023-03-23 RX ORDER — DILTIAZEM HYDROCHLORIDE 180 MG/1
180 CAPSULE, COATED, EXTENDED RELEASE ORAL DAILY
Qty: 90 CAPSULE | Refills: 3 | Status: SHIPPED | OUTPATIENT
Start: 2023-03-23

## 2023-03-23 NOTE — TELEPHONE ENCOUNTER
From: Dk Alba  To: Jack Dick  Sent: 3/22/2023 11:36 PM EDT  Subject: Diltiazem Prescription    Hi Adelia Ceballos,     I am nearing the end of my supply of Diltiazem  mg, 90 count, 1/day. I will run out on April 11. 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 My insurance is now with [facility] / [address] [place] [address] [employer]. My long term prescription provider is [facility]. Please send a new prescription to [facility] for Diltiazem, 90 count, with 3 refills, to get me through another year. The [facility] web site is [employer], and their phone no. is [phone].     Thanks,  Zacarias Alba (Alaric)

## 2023-04-01 ENCOUNTER — PATIENT MESSAGE (OUTPATIENT)
Dept: CARDIOLOGY CLINIC | Age: 68
End: 2023-04-01

## 2023-04-03 ENCOUNTER — TELEPHONE (OUTPATIENT)
Dept: CARDIOLOGY CLINIC | Age: 68
End: 2023-04-03

## 2023-04-03 RX ORDER — EZETIMIBE 10 MG/1
10 TABLET ORAL DAILY
Qty: 90 TABLET | Refills: 3 | Status: SHIPPED | OUTPATIENT
Start: 2023-04-03

## 2023-04-03 NOTE — TELEPHONE ENCOUNTER
Submitted PA for EZETIMIBE  Via CMM Key: [id number] STATUS: Approvedtoday  Request Reference Number: [id number].  EZETIMIBE TAB 10MG is approved through 04/03/2024

## 2023-04-03 NOTE — TELEPHONE ENCOUNTER
From: Dk Alba  To: Dr. Noel Anderson: 4/1/2023 6:24 PM EDT  Subject: Request New Prescription for Ezetimibe    Hi Dr. Hakeem English should be receiving a request from [facility], [address] [place] [address] longterm mail order prescription provider, to renew my prescription for Ezetimibe. 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 [facility] is [address] [place] [address].) Please respond to the above-mentioned request with a new 90-day prescription for Ezetimibe, with 3 refills, to provide me with this medication for a year (if you agree that I should still be on this medication).     Sincerely,  Dk Alba

## 2023-04-17 ENCOUNTER — PATIENT MESSAGE (OUTPATIENT)
Dept: CARDIOLOGY CLINIC | Age: 68
End: 2023-04-17

## 2023-04-18 RX ORDER — LOSARTAN POTASSIUM 100 MG/1
100 TABLET ORAL DAILY
Qty: 90 TABLET | Refills: 3 | Status: SHIPPED | OUTPATIENT
Start: 2023-04-18

## 2023-04-18 NOTE — TELEPHONE ENCOUNTER
Requested Prescriptions     Pending Prescriptions Disp Refills    losartan (COZAAR) 100 MG tablet 90 tablet 3     Sig: Take 1 tablet by mouth daily            Last Office Visit: 11/9/2022     Next Office Visit: 11.07.2023      Last Labs: 03.10.2023

## 2023-05-07 ENCOUNTER — PATIENT MESSAGE (OUTPATIENT)
Dept: CARDIOLOGY CLINIC | Age: 68
End: 2023-05-07

## 2023-05-08 RX ORDER — ROSUVASTATIN CALCIUM 5 MG/1
5 TABLET, COATED ORAL DAILY
Qty: 90 TABLET | Refills: 3 | Status: SHIPPED | OUTPATIENT
Start: 2023-05-08 | End: 2023-05-11

## 2023-05-08 NOTE — TELEPHONE ENCOUNTER
From: Dk Alba  To: Dr. Zhao Juan: 5/7/2023 9:27 PM EDT  Subject: New 90-day Prescription for Rosuvastatin    Hi Dr. Rui Coto should be receiving a request from [facility], [address] [place], [place] [address]term mail order prescription provider, to provide a new prescription for Rosuvastatin. (The prescription you wrote for this medication on 1/24/2023 was under [facility]. 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 [facility] is [address] [place] [address].) Please send [email or web address] a new 90-day prescription for Rosuvastatin, with 3 refills, to provide me with this medication for a year (if you agree that I should still be on this medication). I will run out of Rosuvastatin on 5/20/2023.      Sincerely,  Dk Alba

## 2023-05-10 ENCOUNTER — NURSE ONLY (OUTPATIENT)
Dept: CARDIOLOGY CLINIC | Age: 68
End: 2023-05-10

## 2023-05-10 ENCOUNTER — OFFICE VISIT (OUTPATIENT)
Dept: CARDIOLOGY CLINIC | Age: 68
End: 2023-05-10
Payer: COMMERCIAL

## 2023-05-10 VITALS
HEIGHT: 71 IN | SYSTOLIC BLOOD PRESSURE: 138 MMHG | BODY MASS INDEX: 28.22 KG/M2 | OXYGEN SATURATION: 99 % | HEART RATE: 52 BPM | DIASTOLIC BLOOD PRESSURE: 72 MMHG | WEIGHT: 201.6 LBS

## 2023-05-10 DIAGNOSIS — I48.0 PAF (PAROXYSMAL ATRIAL FIBRILLATION) (HCC): Primary | ICD-10-CM

## 2023-05-10 DIAGNOSIS — E78.5 HYPERLIPIDEMIA, UNSPECIFIED HYPERLIPIDEMIA TYPE: ICD-10-CM

## 2023-05-10 DIAGNOSIS — I10 HYPERTENSION, UNSPECIFIED TYPE: ICD-10-CM

## 2023-05-10 DIAGNOSIS — R94.31 EKG ABNORMALITIES: ICD-10-CM

## 2023-05-10 DIAGNOSIS — I49.1 PAC (PREMATURE ATRIAL CONTRACTION): ICD-10-CM

## 2023-05-10 PROCEDURE — G8417 CALC BMI ABV UP PARAM F/U: HCPCS | Performed by: INTERNAL MEDICINE

## 2023-05-10 PROCEDURE — 3075F SYST BP GE 130 - 139MM HG: CPT | Performed by: INTERNAL MEDICINE

## 2023-05-10 PROCEDURE — G8427 DOCREV CUR MEDS BY ELIG CLIN: HCPCS | Performed by: INTERNAL MEDICINE

## 2023-05-10 PROCEDURE — 93000 ELECTROCARDIOGRAM COMPLETE: CPT | Performed by: INTERNAL MEDICINE

## 2023-05-10 PROCEDURE — 3017F COLORECTAL CA SCREEN DOC REV: CPT | Performed by: INTERNAL MEDICINE

## 2023-05-10 PROCEDURE — 3078F DIAST BP <80 MM HG: CPT | Performed by: INTERNAL MEDICINE

## 2023-05-10 PROCEDURE — 99214 OFFICE O/P EST MOD 30 MIN: CPT | Performed by: INTERNAL MEDICINE

## 2023-05-10 PROCEDURE — 1036F TOBACCO NON-USER: CPT | Performed by: INTERNAL MEDICINE

## 2023-05-10 PROCEDURE — 1123F ACP DISCUSS/DSCN MKR DOCD: CPT | Performed by: INTERNAL MEDICINE

## 2023-05-10 RX ORDER — ROSUVASTATIN CALCIUM 5 MG/1
5 TABLET, COATED ORAL DAILY
COMMUNITY
End: 2023-05-11

## 2023-05-11 ENCOUNTER — TELEPHONE (OUTPATIENT)
Dept: CARDIOLOGY CLINIC | Age: 68
End: 2023-05-11

## 2023-05-11 RX ORDER — ROSUVASTATIN CALCIUM 5 MG
5 TABLET ORAL DAILY
Qty: 90 TABLET | Refills: 3 | Status: SHIPPED | OUTPATIENT
Start: 2023-05-11

## 2023-05-11 NOTE — TELEPHONE ENCOUNTER
Requested Prescriptions     Pending Prescriptions Disp Refills    CRESTOR 5 MG tablet 90 tablet 3     Sig: Take 1 tablet by mouth daily            Last Office Visit: 11/9/2022     Next Office Visit: 11.07.2023

## 2023-05-15 NOTE — TELEPHONE ENCOUNTER
Spoke with the patient and got him scheduled for procedure. We went over instructions and he verbalized understanding.      Procedure - ILR Explant/Implant  Date: 6/20/2023   Arrival time: 12:30 pm   Procedure time: 1:30 pm      Instructions  Medication Instructions: Hold []Xarelto []Eliquis []Coumadin []pradaxa for   Do not eat or drink  for 4 hours prior to procedure    Qgenda updated / Added in [facility] [facility] / emailed cath lab

## 2023-05-22 ENCOUNTER — TELEPHONE (OUTPATIENT)
Dept: CARDIOLOGY CLINIC | Age: 68
End: 2023-05-22

## 2023-05-22 ENCOUNTER — OFFICE VISIT (OUTPATIENT)
Dept: FAMILY MEDICINE CLINIC | Age: 68
End: 2023-05-22

## 2023-05-22 VITALS
DIASTOLIC BLOOD PRESSURE: 72 MMHG | HEART RATE: 60 BPM | OXYGEN SATURATION: 94 % | SYSTOLIC BLOOD PRESSURE: 124 MMHG | TEMPERATURE: 97.3 F | BODY MASS INDEX: 28.03 KG/M2 | WEIGHT: 201 LBS

## 2023-05-22 DIAGNOSIS — N20.0 RIGHT NEPHROLITHIASIS: ICD-10-CM

## 2023-05-22 DIAGNOSIS — Z01.810 PREOP CARDIOVASCULAR EXAM: Primary | ICD-10-CM

## 2023-05-22 DIAGNOSIS — I48.0 INTERMITTENT ATRIAL FIBRILLATION (HCC): ICD-10-CM

## 2023-05-22 RX ORDER — ROSUVASTATIN CALCIUM 5 MG/1
5 TABLET, COATED ORAL DAILY
Qty: 90 TABLET | Refills: 3 | Status: SHIPPED | OUTPATIENT
Start: 2023-05-22

## 2023-05-22 RX ORDER — LOSARTAN POTASSIUM 100 MG/1
100 TABLET ORAL DAILY
Qty: 90 TABLET | Refills: 3 | Status: SHIPPED | OUTPATIENT
Start: 2023-05-22

## 2023-05-22 RX ORDER — ROSUVASTATIN CALCIUM 5 MG/1
5 TABLET, COATED ORAL DAILY
Qty: 14 TABLET | Refills: 0 | Status: SHIPPED | OUTPATIENT
Start: 2023-05-22

## 2023-05-22 SDOH — ECONOMIC STABILITY: FOOD INSECURITY: WITHIN THE PAST 12 MONTHS, YOU WORRIED THAT YOUR FOOD WOULD RUN OUT BEFORE YOU GOT MONEY TO BUY MORE.: NEVER TRUE

## 2023-05-22 SDOH — ECONOMIC STABILITY: INCOME INSECURITY: HOW HARD IS IT FOR YOU TO PAY FOR THE VERY BASICS LIKE FOOD, HOUSING, MEDICAL CARE, AND HEATING?: NOT HARD AT ALL

## 2023-05-22 SDOH — ECONOMIC STABILITY: FOOD INSECURITY: WITHIN THE PAST 12 MONTHS, THE FOOD YOU BOUGHT JUST DIDN'T LAST AND YOU DIDN'T HAVE MONEY TO GET MORE.: NEVER TRUE

## 2023-05-22 SDOH — ECONOMIC STABILITY: HOUSING INSECURITY
IN THE LAST 12 MONTHS, WAS THERE A TIME WHEN YOU DID NOT HAVE A STEADY PLACE TO SLEEP OR SLEPT IN A SHELTER (INCLUDING NOW)?: NO

## 2023-05-22 ASSESSMENT — PATIENT HEALTH QUESTIONNAIRE - PHQ9
SUM OF ALL RESPONSES TO PHQ9 QUESTIONS 1 & 2: 0
2. FEELING DOWN, DEPRESSED OR HOPELESS: 0
SUM OF ALL RESPONSES TO PHQ QUESTIONS 1-9: 0
1. LITTLE INTEREST OR PLEASURE IN DOING THINGS: 0
SUM OF ALL RESPONSES TO PHQ QUESTIONS 1-9: 0

## 2023-05-22 NOTE — PROGRESS NOTES
Requesting surgeon: Dr Kelsy Ewing  Reason for Consult: Preoperative Evaluation of Risk  Surgery location: [facility] [place]  Surgery date: 6/1/2023    HPI:   Isaac Alba is a 69 y.o. male with history of Right Nephrolithiasis. Presents for pre op evaluation for Cystoscopy of  right uteroscope holmun laser stone   Denies fever, chills, or current illness. Denies personal or family history of anesthesia complications. Medications:  Current Outpatient Medications   Medication Sig Dispense Refill    rosuvastatin (CRESTOR) 5 MG tablet Take 1 tablet by mouth daily 14 tablet 0    rosuvastatin (CRESTOR) 5 MG tablet Take 1 tablet by mouth daily 90 tablet 3    losartan (COZAAR) 100 MG tablet Take 1 tablet by mouth daily 90 tablet 3    ezetimibe (ZETIA) 10 MG tablet TAKE 1 TABLET BY MOUTH  DAILY 90 tablet 3    dilTIAZem (CARDIZEM CD) 180 MG extended release capsule Take 1 capsule by mouth daily 90 capsule 3    apixaban (ELIQUIS) 5 MG TABS tablet TAKE 1 TABLET BY MOUTH  TWICE DAILY 180 tablet 3    azelastine (ASTELIN) 0.1 % nasal spray 1 spray by Nasal route 2 times daily Use in each nostril as directed      triamcinolone (NASACORT) 55 MCG/ACT nasal inhaler 2 sprays by Each Nostril route daily      Coenzyme Q-10 200 MG CAPS       IPRATROPIUM BROMIDE NA by Nasal route as needed      chlorpheniramine (CHLOR-TRIMETON) 4 MG tablet Take 1 tablet by mouth every 6 hours as needed for Allergies      hydrocortisone (WESTCORT) 0.2 % cream Apply topically 2 times daily. 30 g 2    Multiple Vitamins-Minerals (MULTI FOR HIM 50+) TABS Take  by mouth. No current facility-administered medications for this visit.      Allergies:  Percocet [oxycodone-acetaminophen]  History:  Past Medical History:   Diagnosis Date    Basal cell carcinoma of right nasal sidewall 6/4/2015    BPH (benign prostatic hyperplasia) 10/18/2010    Colon, diverticulosis 12/24/2018    Hyperlipidemia 10/18/2010    Hypertension 3/22/2021    Intermittent

## 2023-05-22 NOTE — TELEPHONE ENCOUNTER
Patient called, Dr. Hetal Roe prescribed him diltiazem 180 mg daily. Patient wanted Dr. Agapito Cheung to be aware. Patient is also taking losartan 100 mg daily. Patient wants to verity that he should be taking both.

## 2023-06-02 ENCOUNTER — HOSPITAL ENCOUNTER (OUTPATIENT)
Dept: CARDIOLOGY | Age: 68
Discharge: HOME | End: 2023-06-02
Payer: COMMERCIAL

## 2023-06-02 DIAGNOSIS — R94.31 EKG ABNORMALITIES: ICD-10-CM

## 2023-06-02 LAB
LV EF: 58 %
LVEF MODALITY: NORMAL

## 2023-06-02 PROCEDURE — 93306 TTE W/DOPPLER COMPLETE: CPT

## 2023-06-19 ENCOUNTER — TELEPHONE (OUTPATIENT)
Dept: CARDIOLOGY CLINIC | Age: 68
End: 2023-06-19

## 2023-06-19 NOTE — TELEPHONE ENCOUNTER
Spoke with the patient and per instructions he doesn't have to hold food/drink or medications. If he wants to then that is his choice. But Cath lab also confirmed he doesn't have to be NPO when they called him.

## 2023-06-20 ENCOUNTER — HOSPITAL ENCOUNTER (OUTPATIENT)
Dept: CARDIAC CATH/INVASIVE PROCEDURES | Age: 68
Discharge: HOME | End: 2023-06-20
Attending: INTERNAL MEDICINE | Admitting: INTERNAL MEDICINE
Payer: COMMERCIAL

## 2023-06-20 VITALS
RESPIRATION RATE: 18 BRPM | WEIGHT: 201 LBS | DIASTOLIC BLOOD PRESSURE: 76 MMHG | HEART RATE: 51 BPM | SYSTOLIC BLOOD PRESSURE: 144 MMHG | OXYGEN SATURATION: 97 % | HEIGHT: 71 IN | BODY MASS INDEX: 28.14 KG/M2

## 2023-06-20 PROCEDURE — 33286 RMVL SUBQ CAR RHYTHM MNTR: CPT | Performed by: INTERNAL MEDICINE

## 2023-06-20 PROCEDURE — 33285 INSJ SUBQ CAR RHYTHM MNTR: CPT

## 2023-06-20 PROCEDURE — 33286 RMVL SUBQ CAR RHYTHM MNTR: CPT

## 2023-06-20 PROCEDURE — C1764 EVENT RECORDER, CARDIAC: HCPCS

## 2023-06-20 PROCEDURE — 33285 INSJ SUBQ CAR RHYTHM MNTR: CPT | Performed by: INTERNAL MEDICINE

## 2023-06-20 NOTE — H&P
[facility]   Electrophysiology      CC: Atrial fibrillation  HPI: Dk Alba is a 69 y.o. male with a PMH of HLD and asthma. He was initially to discuss an ILR implantation at the request of Dr. George Cotter. He was seen at [address] [place] [address] with concerns for a TIA . He was found to have a PFO and has had 2 episodes of TIA in the past.     Also reports 2 episodes of what he describes as irregular heart beat with rate from 120-60 for almost an hour 6 and 12 months ago. Both his event monitor and Holters have shown short atrial runs      Interval History:     He has felt a few episodes of atrial fibrillation. He had  an appointment  sleep  medicine and has sleep study next week. Assessment and plan:   Paroxysmal Atrial fibrillation, symptomatic   EKG today  sinus    [place] on interrogation - device at RRT - episodes noted 12/22/2023 prior to batter depletion    Continue Cardizem 180 mg daily   Patient has a [id number] Score of 4  (age, HTN,TIA )    ~ continue Eliquis 5 mg BID- no s/s bleeding      ~ creatinine 1.3 03/10/2023    TSH 2.54 03/27/2019     Afib risk factors including age, HTN, obesity, inactivity and ORQUIDEA were discussed with patient. Risk factor modification recommended        Ablation was been discussed at prior visits. Will consider if burden worsens. Medical options are limited due to baseline bradycardia. He would like to continue with medical treatment at this time. He has episodes of symptomatic A-fib. We will replace the loop monitor to allow for further management and monitoring of the burden. If symptoms, burden increases, we can proceed with ablation.     HTN  -Borderline  138/72   -BP goal <130/80  -Home BP monitoring encouraged, printed information provided on how to accurately measure BP at home.  -Counseled to follow a low salt diet to assure blood pressure remains controlled for cardiovascular risk factor modification.   -The patient is counseled to get

## 2023-06-20 NOTE — DISCHARGE INSTRUCTIONS
LINQ INSERTION DISCHARGE INSTRUCTIONS        Remove outer dressing in 48 hours. Leave steri strips intact. Do not get the incision wet for one week. You may be sore, bruised and have a small amount of drainage around the incision site.     Please contact the office if you notice any signs of infection:  Redness / swelling at the incision site  Fever  Yellow drainage at the site  Pain       Phone: [phone]

## 2023-06-20 NOTE — PROCEDURES
[address]     Electrophysiology Procedure Note       Date of Procedure: 6/20/2023  Patient's Name: Clara Alba  YOB: 1955   Medical Record Number: [id number]  Procedure Performed by: Mecca Gerber MD    Procedures performed:    Removal of loop monitor  Loop recorder implantation      Indication of the procedure:    Dk Alba is a 69 y.o. male with   Atrial fibrillation, ILR at [facility]    Details of procedure:   Procedure's risks, benefits and alternatives of procedure were explained to patient. All questions were answered. Patient understood and informed consent was obtained. The patient was brought to the electrophysiology lab in a fasting nonsedated state. Patient was prepped and draped in usual sterile fashion. After injection of 2% lidocaine in the left 4th intercostal area, a 5 mm small incision was made over the old scar. Using blade and blunt dissection the device was removed. . Using ILR insertion device,   the loop recorder was inserted under skin. The skin was covered with Steri-Strips. Blood loss<5 cc  No complications. Device information:   The device is Reveal LINQ ll SN# [id number] [employer]      Implant date: 6/20/2023  R wave 0.51mv  The device was programmed to detect:   VT: 380 ms 16 beats   Bradycardia: 2000 ms     Plan:   The patient will have usual post-implant care. Patient can be discharged home if remains stable with follow up in arrhythmia clinic.

## 2023-06-21 ENCOUNTER — PATIENT MESSAGE (OUTPATIENT)
Dept: DERMATOLOGY | Age: 68
End: 2023-06-21

## 2023-06-21 RX ORDER — ROSUVASTATIN CALCIUM 5 MG/1
5 TABLET, COATED ORAL DAILY
Qty: 90 TABLET | Refills: 3 | OUTPATIENT
Start: 2023-06-21

## 2023-06-27 ENCOUNTER — NURSE ONLY (OUTPATIENT)
Dept: CARDIOLOGY CLINIC | Age: 68
End: 2023-06-27

## 2023-07-17 ENCOUNTER — NURSE ONLY (OUTPATIENT)
Dept: CARDIOLOGY CLINIC | Age: 68
End: 2023-07-17
Payer: COMMERCIAL

## 2023-07-17 DIAGNOSIS — Z45.09 ENCOUNTER FOR LOOP RECORDER CHECK: ICD-10-CM

## 2023-07-17 DIAGNOSIS — I47.1 SVT (SUPRAVENTRICULAR TACHYCARDIA) (HCC): Primary | ICD-10-CM

## 2023-07-17 PROCEDURE — 93298 REM INTERROG DEV EVAL SCRMS: CPT | Performed by: INTERNAL MEDICINE

## 2023-07-17 PROCEDURE — G2066 INTER DEVC REMOTE 30D: HCPCS | Performed by: INTERNAL MEDICINE

## 2023-08-21 PROCEDURE — 93298 REM INTERROG DEV EVAL SCRMS: CPT | Performed by: INTERNAL MEDICINE

## 2023-08-21 PROCEDURE — G2066 INTER DEVC REMOTE 30D: HCPCS | Performed by: INTERNAL MEDICINE

## 2023-09-25 DIAGNOSIS — E78.5 HYPERLIPIDEMIA, UNSPECIFIED HYPERLIPIDEMIA TYPE: Primary | ICD-10-CM

## 2023-09-25 DIAGNOSIS — E78.5 HYPERLIPIDEMIA, UNSPECIFIED HYPERLIPIDEMIA TYPE: ICD-10-CM

## 2023-09-25 LAB
CHOLEST SERPL-MCNC: 133 MG/DL (ref 0–199)
HDLC SERPL-MCNC: 54 MG/DL (ref 40–60)
LDLC SERPL CALC-MCNC: 61 MG/DL
TRIGL SERPL-MCNC: 88 MG/DL (ref 0–150)
VLDLC SERPL CALC-MCNC: 18 MG/DL

## 2023-09-25 PROCEDURE — G2066 INTER DEVC REMOTE 30D: HCPCS | Performed by: INTERNAL MEDICINE

## 2023-09-25 PROCEDURE — 93298 REM INTERROG DEV EVAL SCRMS: CPT | Performed by: INTERNAL MEDICINE

## 2023-09-27 PROBLEM — G45.9 TIA (TRANSIENT ISCHEMIC ATTACK): Status: ACTIVE | Noted: 2023-09-27

## 2023-09-27 NOTE — PROGRESS NOTES
[facility]   Electrophysiology Follow up   Date: 9/28/2023  I had the privilege of visiting Dk Alba in the office. CC:PAF  HPI: Dk Alba is a 77 y.o. male with a PMH of HLD and asthma. He was initially to discuss an ILR implantation at the request of Dr. Patrick Pete. He was seen at [address] [place] [address] with concerns for a TIA . He was found to have a PFO and has had 2 episodes of TIA in the past.     Also reports 2 episodes of what he describes as irregular heart beat with rate from 120-60 for almost an hour 6 and 12 months ago. Both his event monitor and Holters have shown short atrial runs    ILR replaced 6/20/2023    Interval History:   Shital Castañeda presents to the office in follow up. He is feeling well from a cardiac standpoint. He was diagnosed with sleep apnea and is now compliant on his CPAP. Patient denies lightheadedness, dizziness, chest pain, palpitations, orthopnea, edema, presyncope or syncope. Assessment and plan:   Paroxysmal Atrial fibrillation, symptomatic   -ECG today shows Sinus rhythm    -1.1% AT/AF burden per device interrogation today.    -Continue Cardizem 180 mg daily   -Patient has a [id number]-AROn Score of 4  (age, HTN,TIA )continue Eliquis 5 mg BID, creatinine 1.3 03/10/2023        -Ablation was been discussed at prior visits and discussed again at this office visit. Will consider if burden worsens. Medical options are limited due to baseline bradycardia. He would like to continue with medical treatment at this time. -Afib burden remains low at 1.1%. If his episodes become longer, more frequent or increase in symptoms we can consider an ablation at that time. Encouraged him to continue risk factor modifications including compliance with his CPAP, activity, maintaining a healthy weight and  complete cessation of alcohol if possible.  . Discussed that over time afib can cause changes in his heart and it is better to do the ablation sooner to prevent

## 2023-09-28 ENCOUNTER — NURSE ONLY (OUTPATIENT)
Dept: CARDIOLOGY CLINIC | Age: 68
End: 2023-09-28

## 2023-09-28 ENCOUNTER — OFFICE VISIT (OUTPATIENT)
Dept: CARDIOLOGY CLINIC | Age: 68
End: 2023-09-28
Payer: COMMERCIAL

## 2023-09-28 VITALS
BODY MASS INDEX: 29.23 KG/M2 | HEART RATE: 70 BPM | WEIGHT: 208.8 LBS | HEIGHT: 71 IN | OXYGEN SATURATION: 93 % | DIASTOLIC BLOOD PRESSURE: 90 MMHG | SYSTOLIC BLOOD PRESSURE: 150 MMHG

## 2023-09-28 DIAGNOSIS — I10 HYPERTENSION, UNSPECIFIED TYPE: ICD-10-CM

## 2023-09-28 DIAGNOSIS — Z91.89 AT RISK FOR SLEEP APNEA: ICD-10-CM

## 2023-09-28 DIAGNOSIS — E78.5 HYPERLIPIDEMIA, UNSPECIFIED HYPERLIPIDEMIA TYPE: ICD-10-CM

## 2023-09-28 DIAGNOSIS — Q21.12 PFO (PATENT FORAMEN OVALE): ICD-10-CM

## 2023-09-28 DIAGNOSIS — G45.9 TIA (TRANSIENT ISCHEMIC ATTACK): ICD-10-CM

## 2023-09-28 DIAGNOSIS — I48.0 INTERMITTENT ATRIAL FIBRILLATION (HCC): Primary | ICD-10-CM

## 2023-09-28 DIAGNOSIS — I49.1 PAC (PREMATURE ATRIAL CONTRACTION): ICD-10-CM

## 2023-09-28 DIAGNOSIS — I49.3 PVC (PREMATURE VENTRICULAR CONTRACTION): ICD-10-CM

## 2023-09-28 DIAGNOSIS — Z45.09 ENCOUNTER FOR LOOP RECORDER CHECK: ICD-10-CM

## 2023-09-28 PROCEDURE — 3077F SYST BP >= 140 MM HG: CPT | Performed by: INTERNAL MEDICINE

## 2023-09-28 PROCEDURE — G8427 DOCREV CUR MEDS BY ELIG CLIN: HCPCS | Performed by: INTERNAL MEDICINE

## 2023-09-28 PROCEDURE — 3080F DIAST BP >= 90 MM HG: CPT | Performed by: INTERNAL MEDICINE

## 2023-09-28 PROCEDURE — 1123F ACP DISCUSS/DSCN MKR DOCD: CPT | Performed by: INTERNAL MEDICINE

## 2023-09-28 PROCEDURE — 3017F COLORECTAL CA SCREEN DOC REV: CPT | Performed by: INTERNAL MEDICINE

## 2023-09-28 PROCEDURE — 99214 OFFICE O/P EST MOD 30 MIN: CPT | Performed by: INTERNAL MEDICINE

## 2023-09-28 PROCEDURE — 1036F TOBACCO NON-USER: CPT | Performed by: INTERNAL MEDICINE

## 2023-09-28 PROCEDURE — G8417 CALC BMI ABV UP PARAM F/U: HCPCS | Performed by: INTERNAL MEDICINE

## 2023-09-28 PROCEDURE — 93000 ELECTROCARDIOGRAM COMPLETE: CPT | Performed by: INTERNAL MEDICINE

## 2023-09-28 NOTE — PROGRESS NOTES
[facility] report pulled for OV with Dr. Camelia Thomas today. Device shows normal function. For more details see interrogation under Media tab.

## 2023-10-23 ENCOUNTER — HOSPITAL ENCOUNTER (OUTPATIENT)
Age: 68
Discharge: HOME | End: 2023-10-23
Payer: COMMERCIAL

## 2023-10-23 ENCOUNTER — HOSPITAL ENCOUNTER (OUTPATIENT)
Dept: GENERAL RADIOLOGY | Age: 68
Discharge: HOME | End: 2023-10-23
Payer: COMMERCIAL

## 2023-10-23 DIAGNOSIS — N20.0 CALCULUS OF KIDNEY: ICD-10-CM

## 2023-10-23 PROCEDURE — 74018 RADEX ABDOMEN 1 VIEW: CPT

## 2023-10-26 ASSESSMENT — ENCOUNTER SYMPTOMS
BLOOD IN STOOL: 0
ABDOMINAL DISTENTION: 0
CHEST TIGHTNESS: 0
ABDOMINAL PAIN: 0
COUGH: 0
FACIAL SWELLING: 0
EYE DISCHARGE: 0
COLOR CHANGE: 0
VOMITING: 0
SHORTNESS OF BREATH: 0
WHEEZING: 0
BACK PAIN: 0

## 2023-10-26 NOTE — PROGRESS NOTES
performed on 9/25/2019 at outside facility showed positive right to left shunt by bubbles study. 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 Recommend further evaluation with coronary cta.    7/1/19  Summary   There is evidence of right-to-left intracardiac shunting/patent foramen  ovale by agitated saline bubble contrast study. Normal left ventricular size, wall thickness & systolic function. Normal left ventricular inflow profile. There are no significant segmental wall motion abnormalities. The left ventricular ejection fraction is calculated at 75 %. Last CMR  (if available):    CTA: 10/11/22  Impression   Total calcium score of 167 is within the 40th percentile for the patient's   age of 68 y/o . No incidental clinically important extracardiac CT findings. Assessment / Plan:     Hyperlipidemia  At goal on Crestor/Zetia  Will think about LEqvio and dc crestor  Lab Results   Component Value Date    LDLCALC 61 09/25/2023         Intermittent atrial fibrillation (HCC)  F/u with EP   Dilt and eliquis    Hypertension  Controlled on Diltiazem  Follow up in 12 months. I had the opportunity to review the clinical symptoms and presentation of Dk Alba. Patient's allergies and medications were reviewed and updated. Patient's past medical, surgical, social and family history were reviewed and updated. Patient's testing including laboratory, ECGs, monitor, imaging (TTE,GEREMIAS,CMR,cath) were reviewed. All questions and concerns were addressed to the patient/family. Alternatives to my treatment were discussed. The note was completed using EMR. Every effort wasmade to ensure accuracy; however, inadvertent computerized transcription errors may be present. Thank you for allowing me to participate in thecare or Dk Gonzalez MD, [facility], [facility]

## 2023-10-30 PROCEDURE — G2066 INTER DEVC REMOTE 30D: HCPCS | Performed by: INTERNAL MEDICINE

## 2023-10-30 PROCEDURE — 93298 REM INTERROG DEV EVAL SCRMS: CPT | Performed by: INTERNAL MEDICINE

## 2023-10-31 ENCOUNTER — OFFICE VISIT (OUTPATIENT)
Dept: CARDIOLOGY CLINIC | Age: 68
End: 2023-10-31
Payer: COMMERCIAL

## 2023-10-31 VITALS
HEART RATE: 82 BPM | BODY MASS INDEX: 29.21 KG/M2 | SYSTOLIC BLOOD PRESSURE: 126 MMHG | DIASTOLIC BLOOD PRESSURE: 64 MMHG | WEIGHT: 209.4 LBS

## 2023-10-31 DIAGNOSIS — I10 HYPERTENSION, UNSPECIFIED TYPE: ICD-10-CM

## 2023-10-31 DIAGNOSIS — I48.0 INTERMITTENT ATRIAL FIBRILLATION (HCC): ICD-10-CM

## 2023-10-31 DIAGNOSIS — E78.5 HYPERLIPIDEMIA, UNSPECIFIED HYPERLIPIDEMIA TYPE: ICD-10-CM

## 2023-10-31 PROCEDURE — 99214 OFFICE O/P EST MOD 30 MIN: CPT | Performed by: INTERNAL MEDICINE

## 2023-10-31 PROCEDURE — 3074F SYST BP LT 130 MM HG: CPT | Performed by: INTERNAL MEDICINE

## 2023-10-31 PROCEDURE — 3078F DIAST BP <80 MM HG: CPT | Performed by: INTERNAL MEDICINE

## 2023-10-31 PROCEDURE — G8427 DOCREV CUR MEDS BY ELIG CLIN: HCPCS | Performed by: INTERNAL MEDICINE

## 2023-10-31 PROCEDURE — 3017F COLORECTAL CA SCREEN DOC REV: CPT | Performed by: INTERNAL MEDICINE

## 2023-10-31 PROCEDURE — 1123F ACP DISCUSS/DSCN MKR DOCD: CPT | Performed by: INTERNAL MEDICINE

## 2023-10-31 PROCEDURE — G8417 CALC BMI ABV UP PARAM F/U: HCPCS | Performed by: INTERNAL MEDICINE

## 2023-10-31 PROCEDURE — 1036F TOBACCO NON-USER: CPT | Performed by: INTERNAL MEDICINE

## 2023-10-31 PROCEDURE — G8484 FLU IMMUNIZE NO ADMIN: HCPCS | Performed by: INTERNAL MEDICINE

## 2023-10-31 NOTE — ASSESSMENT & PLAN NOTE
At goal on Crestor/Nico  Will think about LEqvio and dc crestor  Lab Results   Component Value Date    [facility] 61 09/25/2023

## 2023-11-08 ENCOUNTER — OFFICE VISIT (OUTPATIENT)
Dept: FAMILY MEDICINE CLINIC | Age: 68
End: 2023-11-08
Payer: COMMERCIAL

## 2023-11-08 VITALS
OXYGEN SATURATION: 98 % | DIASTOLIC BLOOD PRESSURE: 72 MMHG | HEART RATE: 68 BPM | BODY MASS INDEX: 29.4 KG/M2 | SYSTOLIC BLOOD PRESSURE: 122 MMHG | HEIGHT: 71 IN | WEIGHT: 210 LBS

## 2023-11-08 DIAGNOSIS — I10 HYPERTENSION, UNSPECIFIED TYPE: ICD-10-CM

## 2023-11-08 DIAGNOSIS — Q21.12 PFO (PATENT FORAMEN OVALE): ICD-10-CM

## 2023-11-08 DIAGNOSIS — Z01.818 PRE-OP EXAMINATION: Primary | ICD-10-CM

## 2023-11-08 DIAGNOSIS — N40.1 BPH WITH URINARY OBSTRUCTION: ICD-10-CM

## 2023-11-08 DIAGNOSIS — N13.8 BPH WITH URINARY OBSTRUCTION: ICD-10-CM

## 2023-11-08 DIAGNOSIS — I48.0 INTERMITTENT ATRIAL FIBRILLATION (HCC): ICD-10-CM

## 2023-11-08 PROCEDURE — 3078F DIAST BP <80 MM HG: CPT | Performed by: STUDENT IN AN ORGANIZED HEALTH CARE EDUCATION/TRAINING PROGRAM

## 2023-11-08 PROCEDURE — 3074F SYST BP LT 130 MM HG: CPT | Performed by: STUDENT IN AN ORGANIZED HEALTH CARE EDUCATION/TRAINING PROGRAM

## 2023-11-08 PROCEDURE — G8427 DOCREV CUR MEDS BY ELIG CLIN: HCPCS | Performed by: STUDENT IN AN ORGANIZED HEALTH CARE EDUCATION/TRAINING PROGRAM

## 2023-11-08 PROCEDURE — 3017F COLORECTAL CA SCREEN DOC REV: CPT | Performed by: STUDENT IN AN ORGANIZED HEALTH CARE EDUCATION/TRAINING PROGRAM

## 2023-11-08 PROCEDURE — 99214 OFFICE O/P EST MOD 30 MIN: CPT | Performed by: STUDENT IN AN ORGANIZED HEALTH CARE EDUCATION/TRAINING PROGRAM

## 2023-11-08 PROCEDURE — G8484 FLU IMMUNIZE NO ADMIN: HCPCS | Performed by: STUDENT IN AN ORGANIZED HEALTH CARE EDUCATION/TRAINING PROGRAM

## 2023-11-08 PROCEDURE — 93000 ELECTROCARDIOGRAM COMPLETE: CPT | Performed by: STUDENT IN AN ORGANIZED HEALTH CARE EDUCATION/TRAINING PROGRAM

## 2023-11-08 PROCEDURE — 1036F TOBACCO NON-USER: CPT | Performed by: STUDENT IN AN ORGANIZED HEALTH CARE EDUCATION/TRAINING PROGRAM

## 2023-11-08 PROCEDURE — G8417 CALC BMI ABV UP PARAM F/U: HCPCS | Performed by: STUDENT IN AN ORGANIZED HEALTH CARE EDUCATION/TRAINING PROGRAM

## 2023-11-08 PROCEDURE — 1123F ACP DISCUSS/DSCN MKR DOCD: CPT | Performed by: STUDENT IN AN ORGANIZED HEALTH CARE EDUCATION/TRAINING PROGRAM

## 2023-11-08 NOTE — PROGRESS NOTES
Subjective:     Dk Alba who presentsto the office today for a preoperative consultation at the request of surgeon Dr. Any Redd who plans on performing Lithotripsy on  TBD date Planned anesthesia is general anesthesia   The patient has the following known anesthesia issues: Denies prior issues with this  Patient has a bleeding risk of : Is on Eliquis   Patient's medications, allergies, past medical, surgical,social and family histories were reviewed and updated as appropriate. #Afib  #HTN  #BPH w/ urinary obstruction  -Takes Losartan 100 mg QD, Cardizem 180 mg QD, /72 today, endorses compliance with meds, follows with Cardiology  -Takes Eliquis 5 mg BID for A fib, denies issues with bleeding or falls at home  -Also has noted PFO in echo, EKG done today shows RBBB, seen on prior EKG in 9/23  -Denies current symptoms including chest pain, palpitations   -Denies history of MI or stroke     Lab Results   Component Value Date    CREATININE 1.3 (H) 03/10/2023    BUN 21 (H) 12/01/2022     12/01/2022    K 4.4 12/01/2022     12/01/2022    CO2 26 12/01/2022            Past Medical History:   Diagnosis Date    Basal cell carcinoma of right nasal sidewall 6/4/2015    BPH (benign prostatic hyperplasia) 10/18/2010    Colon, diverticulosis 12/24/2018    Hyperlipidemia 10/18/2010    Hypertension 3/22/2021    Intermittent atrial fibrillation ([address]) 10/13/2020    Nephrolithiasis     s/p lithotripsy x 4, calcium oxalate.      PFO (patent foramen ovale)     Raynaud's disease 12/4/2014    Wears glasses      Past Surgical History:   Procedure Laterality Date    COLONOSCOPY  2007    q 10,5/2018    COLONOSCOPY  2018    repeat q 10-diverticulosis    CYSTOSCOPY  11/14/2013     CYSTOSCOPY TRANSURETHRAL RESECTION PROSTATE    HERNIA REPAIR      KIDNEY STONE SURGERY Left 08/29/2013    perc. nephrolithotomy,stint placement    MOHS SURGERY  06/2015    [facility]-Dr. Shanell Howard rt nasal sidewall    OTHER SURGICAL HISTORY

## 2023-11-09 DIAGNOSIS — I10 HYPERTENSION, UNSPECIFIED TYPE: ICD-10-CM

## 2023-11-09 DIAGNOSIS — Z01.818 PRE-OP EXAMINATION: ICD-10-CM

## 2023-11-09 LAB
ALBUMIN SERPL-MCNC: 4.5 G/DL (ref 3.4–5)
ALBUMIN/GLOB SERPL: 2.4 {RATIO} (ref 1.1–2.2)
ALP SERPL-CCNC: 102 U/L (ref 40–129)
ALT SERPL-CCNC: 29 U/L (ref 10–40)
ANION GAP SERPL CALCULATED.3IONS-SCNC: 11 MMOL/L (ref 3–16)
AST SERPL-CCNC: 28 U/L (ref 15–37)
BILIRUB SERPL-MCNC: 0.4 MG/DL (ref 0–1)
BUN SERPL-MCNC: 18 MG/DL (ref 7–20)
CALCIUM SERPL-MCNC: 10.1 MG/DL (ref 8.3–10.6)
CHLORIDE SERPL-SCNC: 103 MMOL/L (ref 99–110)
CO2 SERPL-SCNC: 27 MMOL/L (ref 21–32)
CREAT SERPL-MCNC: 1.4 MG/DL (ref 0.8–1.3)
GFR SERPLBLD CREATININE-BSD FMLA CKD-EPI: 54 ML/MIN/{1.73_M2}
GLUCOSE SERPL-MCNC: 90 MG/DL (ref 70–99)
POTASSIUM SERPL-SCNC: 4.7 MMOL/L (ref 3.5–5.1)
PROT SERPL-MCNC: 6.4 G/DL (ref 6.4–8.2)
SODIUM SERPL-SCNC: 141 MMOL/L (ref 136–145)

## 2023-11-13 ENCOUNTER — TELEPHONE (OUTPATIENT)
Dept: CARDIOLOGY CLINIC | Age: 68
End: 2023-11-13

## 2023-11-13 NOTE — TELEPHONE ENCOUNTER
Lady Goncalves called to ask the office to bridge the Pt for the procedure on 11/16. Please call if there are question.   Thank you

## 2023-11-13 NOTE — TELEPHONE ENCOUNTER
Troy Cortez called and stated she spoke with fariba regarding a cardiac letter for the patient. She stated she received a verbal confirmation for patient to omit medication. She stated the patient has a letter from Dr. Ivan Watson but needed a cardiac clearance from Dr. Heather Green for omitting medication for 3 days. She stated she needs this ASAP. CARDIAC CLEARANCE     Are you taking Blood Thinners? yes  apixaban (ELIQUIS) 5 MG TABS tablet  If so what? (Name/dose/frequesncy)    Does the surgeon want you to stop your blood thinner? Yes If so for how long?  3 days    Phone Number and Contact Name for Physicians office: Dr. Pat Condon [phone]     Fax number to send information: [phone]

## 2023-11-13 NOTE — TELEPHONE ENCOUNTER
Spoke with Benji Beyer, cardiology would prefer 2 day hold for Eliquis. If needed longer will need to be bridged by Dr. Diaz Credit office.

## 2023-11-13 NOTE — TELEPHONE ENCOUNTER
Per NPSR okay to hold Eliquis for three days.      Per pre -admission testing fax updated letter to [phone]

## 2023-11-20 ENCOUNTER — OFFICE VISIT (OUTPATIENT)
Dept: DERMATOLOGY | Age: 68
End: 2023-11-20
Payer: COMMERCIAL

## 2023-11-20 DIAGNOSIS — D23.9 DERMATOFIBROMA: ICD-10-CM

## 2023-11-20 DIAGNOSIS — L57.0 AK (ACTINIC KERATOSIS): ICD-10-CM

## 2023-11-20 DIAGNOSIS — L82.0 INFLAMED SEBORRHEIC KERATOSIS: ICD-10-CM

## 2023-11-20 DIAGNOSIS — M79.674 TOE PAIN, RIGHT: ICD-10-CM

## 2023-11-20 DIAGNOSIS — L82.1 SEBORRHEIC KERATOSIS: ICD-10-CM

## 2023-11-20 DIAGNOSIS — D17.1 LIPOMA OF CHEST WALL: ICD-10-CM

## 2023-11-20 DIAGNOSIS — D22.9 MULTIPLE NEVI: ICD-10-CM

## 2023-11-20 DIAGNOSIS — Z85.828 HISTORY OF NONMELANOMA SKIN CANCER: Primary | ICD-10-CM

## 2023-11-20 PROCEDURE — G8484 FLU IMMUNIZE NO ADMIN: HCPCS | Performed by: DERMATOLOGY

## 2023-11-20 PROCEDURE — 3017F COLORECTAL CA SCREEN DOC REV: CPT | Performed by: DERMATOLOGY

## 2023-11-20 PROCEDURE — 1036F TOBACCO NON-USER: CPT | Performed by: DERMATOLOGY

## 2023-11-20 PROCEDURE — 17110 DESTRUCTION B9 LES UP TO 14: CPT | Performed by: DERMATOLOGY

## 2023-11-20 PROCEDURE — G8427 DOCREV CUR MEDS BY ELIG CLIN: HCPCS | Performed by: DERMATOLOGY

## 2023-11-20 PROCEDURE — G8417 CALC BMI ABV UP PARAM F/U: HCPCS | Performed by: DERMATOLOGY

## 2023-11-20 PROCEDURE — 1123F ACP DISCUSS/DSCN MKR DOCD: CPT | Performed by: DERMATOLOGY

## 2023-11-20 PROCEDURE — 99213 OFFICE O/P EST LOW 20 MIN: CPT | Performed by: DERMATOLOGY

## 2023-11-20 PROCEDURE — 17000 DESTRUCT PREMALG LESION: CPT | Performed by: DERMATOLOGY

## 2023-11-20 RX ORDER — VITAMIN E 268 MG
400 CAPSULE ORAL DAILY
COMMUNITY

## 2023-11-20 NOTE — PROGRESS NOTES
topically 2 times daily. 30 g 2    Multiple Vitamins-Minerals (MULTI FOR HIM 50+) TABS Take  by mouth. Allergies   Allergen Reactions    Percocet [Oxycodone-Acetaminophen] Other (See Comments)     constipation         Physical Examination     Gen, NAD    FSE today except for underwear covered areas    Scar clear  trunk and extremities with scattered brown macules and papules including stable dark obrowm macule on the adomen  L popliteal area with firm dermal pinkish 5 mm papule   L cheek with stuck-on dull-surfaced crusted tan papule  R nasal dorsum clear, smooth  L chest/shoulder with mobile 4-5 cm subcut nodule  Vertex of the scalp with small rough pink macule  Right great toe with subtle scar on the dorsum but no significant erythema, warmth or other inflammation or primary lesions. Assessment and Plan     1. Hx of NMSC, no signs recurrence  2. SK's and benign-appearing nevi  - Monitor for ABCD's of MM and si/sx of NMSC  Continue sun protection - OTC sunscreen with SPF 30-50+ recommended and reviewed usage  Encouraged skin check yearly (sooner if indicated), self checks    3. AK's - 1 new on the vertex scalp  *frontal scalp and FH clear from previous trx  *R nasal dorsum - HAK - transected at deep on bx  - clear s/p 5-FU  - cont sun protection  - 1  lesion(s) treated with liquid nitrogen with cryac or swab. Treated with 2 cycles for 1-5 seconds each after consent from patient. Patient educated on risk of blister, hypopigmentation/scar and wound care. Tolerated well. 4. L popliteal area - DF  - reassured    5. L cheek - ISK  - - 1 lesion(s) treated with liquid nitrogen with cryac or swab. Treated with 2 cycles for 1-5 seconds each after consent from patient. Patient educated on risk of blister, hypopigmentation/scar and wound care. Tolerated well. 6. Lipoma - L chest/shoulder - stable  - reassured and discussed benign fx  - ed excision if sx, growth - gen surgery    7.   Pain and

## 2023-12-08 PROCEDURE — G2066 INTER DEVC REMOTE 30D: HCPCS | Performed by: INTERNAL MEDICINE

## 2023-12-08 PROCEDURE — 93298 REM INTERROG DEV EVAL SCRMS: CPT | Performed by: INTERNAL MEDICINE

## 2023-12-11 DIAGNOSIS — Z12.5 PROSTATE CANCER SCREENING: ICD-10-CM

## 2023-12-11 DIAGNOSIS — R73.9 HYPERGLYCEMIA: ICD-10-CM

## 2023-12-11 DIAGNOSIS — E55.9 VITAMIN D DEFICIENCY: ICD-10-CM

## 2023-12-11 DIAGNOSIS — E78.5 HYPERLIPIDEMIA, UNSPECIFIED HYPERLIPIDEMIA TYPE: ICD-10-CM

## 2023-12-11 DIAGNOSIS — I48.0 INTERMITTENT ATRIAL FIBRILLATION (HCC): Primary | ICD-10-CM

## 2023-12-11 DIAGNOSIS — I10 HYPERTENSION, UNSPECIFIED TYPE: ICD-10-CM

## 2023-12-12 DIAGNOSIS — I10 HYPERTENSION, UNSPECIFIED TYPE: ICD-10-CM

## 2023-12-12 DIAGNOSIS — E55.9 VITAMIN D DEFICIENCY: ICD-10-CM

## 2023-12-12 DIAGNOSIS — Z12.5 PROSTATE CANCER SCREENING: ICD-10-CM

## 2023-12-12 DIAGNOSIS — E78.5 HYPERLIPIDEMIA, UNSPECIFIED HYPERLIPIDEMIA TYPE: ICD-10-CM

## 2023-12-12 DIAGNOSIS — R73.9 HYPERGLYCEMIA: ICD-10-CM

## 2023-12-12 LAB
25(OH)D3 SERPL-MCNC: 38.6 NG/ML
ALBUMIN SERPL-MCNC: 4.5 G/DL (ref 3.4–5)
ALBUMIN/GLOB SERPL: 2.8 {RATIO} (ref 1.1–2.2)
ALP SERPL-CCNC: 106 U/L (ref 40–129)
ALT SERPL-CCNC: 22 U/L (ref 10–40)
ANION GAP SERPL CALCULATED.3IONS-SCNC: 9 MMOL/L (ref 3–16)
AST SERPL-CCNC: 26 U/L (ref 15–37)
BASOPHILS # BLD: 0 K/UL (ref 0–0.2)
BASOPHILS NFR BLD: 0.8 %
BILIRUB SERPL-MCNC: 0.3 MG/DL (ref 0–1)
BUN SERPL-MCNC: 17 MG/DL (ref 7–20)
CALCIUM SERPL-MCNC: 9 MG/DL (ref 8.3–10.6)
CHLORIDE SERPL-SCNC: 106 MMOL/L (ref 99–110)
CHOLEST SERPL-MCNC: 129 MG/DL (ref 0–199)
CO2 SERPL-SCNC: 27 MMOL/L (ref 21–32)
CREAT SERPL-MCNC: 1.1 MG/DL (ref 0.8–1.3)
DEPRECATED RDW RBC AUTO: 13.8 % (ref 12.4–15.4)
EOSINOPHIL # BLD: 0.1 K/UL (ref 0–0.6)
EOSINOPHIL NFR BLD: 1.7 %
GFR SERPLBLD CREATININE-BSD FMLA CKD-EPI: >60 ML/MIN/{1.73_M2}
GLUCOSE SERPL-MCNC: 90 MG/DL (ref 70–99)
HCT VFR BLD AUTO: 45.2 % (ref 40.5–52.5)
HDLC SERPL-MCNC: 57 MG/DL (ref 40–60)
HGB BLD-MCNC: 15.4 G/DL (ref 13.5–17.5)
LDLC SERPL CALC-MCNC: 57 MG/DL
LYMPHOCYTES # BLD: 1 K/UL (ref 1–5.1)
LYMPHOCYTES NFR BLD: 17.5 %
MCH RBC QN AUTO: 30.6 PG (ref 26–34)
MCHC RBC AUTO-ENTMCNC: 34.1 G/DL (ref 31–36)
MCV RBC AUTO: 89.8 FL (ref 80–100)
MONOCYTES # BLD: 0.7 K/UL (ref 0–1.3)
MONOCYTES NFR BLD: 12.1 %
NEUTROPHILS # BLD: 4.1 K/UL (ref 1.7–7.7)
NEUTROPHILS NFR BLD: 67.9 %
PLATELET # BLD AUTO: 184 K/UL (ref 135–450)
PMV BLD AUTO: 8.3 FL (ref 5–10.5)
POTASSIUM SERPL-SCNC: 4.7 MMOL/L (ref 3.5–5.1)
PROT SERPL-MCNC: 6.1 G/DL (ref 6.4–8.2)
PSA SERPL DL<=0.01 NG/ML-MCNC: 6.71 NG/ML (ref 0–4)
RBC # BLD AUTO: 5.04 M/UL (ref 4.2–5.9)
SODIUM SERPL-SCNC: 142 MMOL/L (ref 136–145)
TRIGL SERPL-MCNC: 75 MG/DL (ref 0–150)
VLDLC SERPL CALC-MCNC: 15 MG/DL
WBC # BLD AUTO: 6 K/UL (ref 4–11)

## 2023-12-13 ENCOUNTER — OFFICE VISIT (OUTPATIENT)
Dept: FAMILY MEDICINE CLINIC | Age: 68
End: 2023-12-13
Payer: COMMERCIAL

## 2023-12-13 VITALS
BODY MASS INDEX: 30.15 KG/M2 | SYSTOLIC BLOOD PRESSURE: 120 MMHG | HEART RATE: 61 BPM | DIASTOLIC BLOOD PRESSURE: 80 MMHG | WEIGHT: 215.4 LBS | HEIGHT: 71 IN | OXYGEN SATURATION: 97 %

## 2023-12-13 DIAGNOSIS — N20.0 RIGHT NEPHROLITHIASIS: ICD-10-CM

## 2023-12-13 DIAGNOSIS — I48.0 INTERMITTENT ATRIAL FIBRILLATION (HCC): ICD-10-CM

## 2023-12-13 DIAGNOSIS — E78.5 HYPERLIPIDEMIA, UNSPECIFIED HYPERLIPIDEMIA TYPE: ICD-10-CM

## 2023-12-13 DIAGNOSIS — C61 PROSTATE CA (HCC): ICD-10-CM

## 2023-12-13 DIAGNOSIS — I10 HYPERTENSION, UNSPECIFIED TYPE: ICD-10-CM

## 2023-12-13 DIAGNOSIS — K57.30 COLON, DIVERTICULOSIS: ICD-10-CM

## 2023-12-13 PROBLEM — G47.33 OSA ON CPAP: Status: ACTIVE | Noted: 2021-11-10

## 2023-12-13 LAB
EST. AVERAGE GLUCOSE BLD GHB EST-MCNC: 111.2 MG/DL
HBA1C MFR BLD: 5.5 %

## 2023-12-13 PROCEDURE — 3074F SYST BP LT 130 MM HG: CPT | Performed by: FAMILY MEDICINE

## 2023-12-13 PROCEDURE — 3079F DIAST BP 80-89 MM HG: CPT | Performed by: FAMILY MEDICINE

## 2023-12-13 PROCEDURE — 99397 PER PM REEVAL EST PAT 65+ YR: CPT | Performed by: FAMILY MEDICINE

## 2023-12-13 PROCEDURE — G8484 FLU IMMUNIZE NO ADMIN: HCPCS | Performed by: FAMILY MEDICINE

## 2023-12-13 ASSESSMENT — PATIENT HEALTH QUESTIONNAIRE - PHQ9
1. LITTLE INTEREST OR PLEASURE IN DOING THINGS: 0
SUM OF ALL RESPONSES TO PHQ QUESTIONS 1-9: 0
SUM OF ALL RESPONSES TO PHQ QUESTIONS 1-9: 0
2. FEELING DOWN, DEPRESSED OR HOPELESS: 0
SUM OF ALL RESPONSES TO PHQ QUESTIONS 1-9: 0
SUM OF ALL RESPONSES TO PHQ QUESTIONS 1-9: 0
SUM OF ALL RESPONSES TO PHQ9 QUESTIONS 1 & 2: 0

## 2023-12-13 NOTE — PROGRESS NOTES
History and Physical      Dk Alba  YOB: 1955    Date of Service:  12/13/2023    Chief Complaint:   Anoop Alba is a 77 y.o. male who presents for complete physical examination.     HPI: cpx      Wt Readings from Last 3 Encounters:   12/13/23 97.7 kg (215 lb 6.4 oz)   11/08/23 95.3 kg (210 lb)   10/31/23 95 kg (209 lb 6.4 oz)     BP Readings from Last 3 Encounters:   12/13/23 120/80   11/08/23 122/72   10/31/23 126/64       Patient Active Problem List   Diagnosis    Hyperlipidemia    BPH with urinary obstruction    Right nephrolithiasis    RBBB    Raynaud's disease    Rhinitis due food    History of basal cell cancer    Chronic throat clearing    Atrophy of right kidney    Colon, diverticulosis    PFO (patent foramen ovale)    Encounter for loop recorder check    Prostate CA ([address])    Ganglion cyst of foot    Intermittent atrial fibrillation (HCC)    Hypertension    SVT (supraventricular tachycardia)    PAC (premature atrial contraction)    PVC (premature ventricular contraction)    Status post placement of implantable loop recorder    Lipoma of left upper extremity    At risk for sleep apnea    TIA (transient ischemic attack)       Preventive Care:  Health Maintenance   Topic Date Due    Respiratory Syncytial Virus (RSV) age 61 yrs+ (1 - 1-dose 60+ series) Never done    Lipids  12/12/2024    Prostate Specific Antigen (PSA) Screening or Monitoring  12/12/2024    Depression Screen  12/13/2024    Colorectal Cancer Screen  05/08/2028    DTaP/Tdap/Td vaccine (3 - Td or Tdap) 12/08/2031    Flu vaccine  Completed    Shingles vaccine  Completed    Pneumococcal 65+ years Vaccine  Completed    COVID-19 Vaccine  Completed    Hepatitis C screen  Completed    Hepatitis A vaccine  Aged Out    Hepatitis B vaccine  Aged Out    Hib vaccine  Aged Out    Meningococcal (ACWY) vaccine  Aged Out    GFR test (Diabetes, CKD 3-4, OR last GFR 15-59)  Discontinued      Self-testicular exams: Yes  Sexual activity: single

## 2024-01-13 PROCEDURE — 93298 REM INTERROG DEV EVAL SCRMS: CPT | Performed by: INTERNAL MEDICINE

## 2024-01-16 RX ORDER — EZETIMIBE 10 MG/1
10 TABLET ORAL DAILY
Qty: 90 TABLET | Refills: 3 | Status: SHIPPED | OUTPATIENT
Start: 2024-01-16 | End: 2024-01-18

## 2024-01-16 NOTE — TELEPHONE ENCOUNTER
Requested Prescriptions     Pending Prescriptions Disp Refills    ezetimibe (ZETIA) 10 MG tablet 90 tablet 3     Sig: Take 1 tablet by mouth daily            Last Office Visit: 11/9/2022     Next Office Visit: 10.29.2024

## 2024-01-17 RX ORDER — LOSARTAN POTASSIUM 100 MG/1
100 TABLET ORAL DAILY
Qty: 90 TABLET | Refills: 3 | Status: SHIPPED | OUTPATIENT
Start: 2024-01-17 | End: 2024-01-18

## 2024-01-17 NOTE — TELEPHONE ENCOUNTER
Requested Prescriptions     Pending Prescriptions Disp Refills    losartan (COZAAR) 100 MG tablet 90 tablet 3     Sig: Take 1 tablet by mouth daily        Last Office Visit: 11/9/2022     Next Office Visit: 10/29/2024

## 2024-01-18 RX ORDER — LOSARTAN POTASSIUM 100 MG/1
100 TABLET ORAL DAILY
Qty: 90 TABLET | Refills: 3 | Status: SHIPPED | OUTPATIENT
Start: 2024-01-18

## 2024-01-18 RX ORDER — EZETIMIBE 10 MG/1
10 TABLET ORAL DAILY
Qty: 90 TABLET | Refills: 3 | Status: SHIPPED | OUTPATIENT
Start: 2024-01-18

## 2024-01-29 ENCOUNTER — TELEPHONE (OUTPATIENT)
Dept: CARDIOLOGY CLINIC | Age: 69
End: 2024-01-29

## 2024-01-29 NOTE — TELEPHONE ENCOUNTER
Patient needs 30 day emergency medication call into his at home mail service.. they are blocking his prescriptions because [employer] lost them in the mail, he tried to get them filled at [facility]. Patient would like a call back when its finalized.  Patients cell : [phone]   At home mail pharmacy ph:  [phone]

## 2024-01-30 RX ORDER — EZETIMIBE 10 MG/1
10 TABLET ORAL DAILY
Qty: 30 TABLET | Refills: 0 | Status: SHIPPED | OUTPATIENT
Start: 2024-01-30

## 2024-01-30 RX ORDER — LOSARTAN POTASSIUM 100 MG/1
100 TABLET ORAL DAILY
Qty: 30 TABLET | Refills: 0 | Status: SHIPPED | OUTPATIENT
Start: 2024-01-30

## 2024-01-30 NOTE — TELEPHONE ENCOUNTER
Spoke with patient, he states that [facility] is is sending him another 90 day supply for the Losartan and Zetia in lieu of the previous scripts that got lost in the mail. However, he is requesting a 30 day supply of above meds to be sent to a local [facility] on [address] to tide him over while waiting for the mail order medication. Routed to Dr. Caputo for signature

## 2024-01-30 NOTE — TELEPHONE ENCOUNTER
Requested Prescriptions     Pending Prescriptions Disp Refills    ezetimibe (ZETIA) 10 MG tablet 30 tablet      Sig: Take 1 tablet by mouth daily    losartan (COZAAR) 100 MG tablet 30 tablet 0     Sig: Take 1 tablet by mouth daily              Last Office Visit: 11/9/2022     Next Office Visit: 10.29.2024        Last Labs: 12.12.2023

## 2024-01-30 NOTE — TELEPHONE ENCOUNTER
Patient called back after a phone call this morning stating his medication is still blocked. Patient would like a call back as soon as possible.  Patients cell : [phone]

## 2024-01-30 NOTE — TELEPHONE ENCOUNTER
Pt called stating that he now has all the medications filled and no longer needs any follow up with the pharmacies.    [phone]

## 2024-02-16 ENCOUNTER — TELEPHONE (OUTPATIENT)
Dept: CARDIOLOGY CLINIC | Age: 69
End: 2024-02-16

## 2024-02-16 NOTE — TELEPHONE ENCOUNTER
Pt called stating that per his last OV with Harshal it was his understanding that he was to switch cholesterol medications to Leqvio.   Pt is not clear on how the Leqvio injections work. Pt would like to speak to clinical staff about whether or not this is self injected and able to be submitted to mail in pharmacy.     Pt also reached out to Medicare (pt has A,B,D and Supplemental). Pt was informed that Medicare had to be contacted by the provider via the provider line for approval. Pt also noted that the drug was not on Medicare's formulary list. Pt was not given a contact number or person.    [phone]

## 2024-02-17 PROCEDURE — 93298 REM INTERROG DEV EVAL SCRMS: CPT | Performed by: INTERNAL MEDICINE

## 2024-02-29 ENCOUNTER — TELEPHONE (OUTPATIENT)
Dept: CARDIOLOGY CLINIC | Age: 69
End: 2024-02-29

## 2024-02-29 NOTE — TELEPHONE ENCOUNTER
Pt asking to speak to Vicky regarding a billing issue from 1/13/24 ICD transmission. Pt states he has tried for 3 days to contact Billing with no answer waiting today 1 hr and 13 min. States he needs to give them new billing info so this can be resolved. Please call pt at [phone]

## 2024-03-05 ENCOUNTER — TELEPHONE (OUTPATIENT)
Dept: CARDIOLOGY CLINIC | Age: 69
End: 2024-03-05

## 2024-03-05 NOTE — TELEPHONE ENCOUNTER
Pt states he received a letter on his C-PAP that there are magnets and could interfere with devices. Pt is asking if this pertains to his. Please call to advise.

## 2024-03-15 RX ORDER — DILTIAZEM HYDROCHLORIDE 180 MG/1
CAPSULE, COATED, EXTENDED RELEASE ORAL DAILY
Qty: 90 CAPSULE | Refills: 2 | Status: SHIPPED | OUTPATIENT
Start: 2024-03-15

## 2024-03-15 NOTE — TELEPHONE ENCOUNTER
Received refill request for Diltiazem from [facility] pharmacy.    Last ov:2023 NPSR    Last EK2023    Last Refill:2023    Next appointment:2024 MARTÍN

## 2024-03-18 ENCOUNTER — PATIENT MESSAGE (OUTPATIENT)
Dept: CARDIOLOGY CLINIC | Age: 69
End: 2024-03-18

## 2024-03-19 RX ORDER — DABIGATRAN ETEXILATE 150 MG/1
150 CAPSULE ORAL 2 TIMES DAILY
Qty: 180 CAPSULE | Refills: 3 | OUTPATIENT
Start: 2024-03-19

## 2024-03-19 NOTE — TELEPHONE ENCOUNTER
From: Dk Alba  To: Dr. Albert Pearce  Sent: 3/18/2024 9:49 PM EDT  Subject: Dabigatran Etexilate Prescription    Hi, Dr Pearce/Jerald,    Please send a 90-day prescription, with 3 refills, for Dabigatran Etexilate to the [facility] pharmacy at [address] [place] [address] , [facility], phone no. [phone]. With my SingleCare prescription discount card I will be able to get this for $185.19, which is more than I would like to pay for an anticoagulant, but from the research I have done, and your recommendations, I understand the desire to go with the Dabigatran/Pradaxa rather than Warfarin. This is also a considerable discount from what I would be paying under Medicare to continue with Eliquis, which would be $562, $421, and 2x $439 for each 90-day supply in a year. I’m hoping the Government succeeds in bringing this Eliquis price down when they negotiate with the pharmaceutical company next year. The new price won’t be available until 2026, but let’s all keep our fingers crossed that the government can have an impact on this terribly high price, which is too much for retired seniors to have to pay under Medicare.    Thanks,  Zacarias Alba

## 2024-03-22 ENCOUNTER — TELEPHONE (OUTPATIENT)
Dept: CARDIOLOGY CLINIC | Age: 69
End: 2024-03-22

## 2024-03-22 RX ORDER — DABIGATRAN ETEXILATE 150 MG/1
150 CAPSULE ORAL 2 TIMES DAILY
Qty: 180 CAPSULE | Refills: 3 | Status: SHIPPED | OUTPATIENT
Start: 2024-03-22

## 2024-03-22 NOTE — TELEPHONE ENCOUNTER
Pt called asking to cancel and reschedule Device check and MXA appointment on 3/27/24 @ 4:00.. He is asking for something in the next 2 weeks  but I could not find an opening. He is available Tuesday, Wednesday and Thursday. Please review schedule and let me know so I can get him scheduled. Thank you.

## 2024-03-23 PROCEDURE — 93298 REM INTERROG DEV EVAL SCRMS: CPT | Performed by: INTERNAL MEDICINE

## 2024-03-30 DIAGNOSIS — R73.9 HYPERGLYCEMIA: Primary | ICD-10-CM

## 2024-03-30 DIAGNOSIS — I48.0 INTERMITTENT ATRIAL FIBRILLATION (HCC): ICD-10-CM

## 2024-03-30 DIAGNOSIS — N20.0 RIGHT NEPHROLITHIASIS: ICD-10-CM

## 2024-03-30 DIAGNOSIS — I10 HYPERTENSION, UNSPECIFIED TYPE: ICD-10-CM

## 2024-03-30 DIAGNOSIS — E78.5 HYPERLIPIDEMIA, UNSPECIFIED HYPERLIPIDEMIA TYPE: ICD-10-CM

## 2024-03-30 DIAGNOSIS — E55.9 VITAMIN D DEFICIENCY: ICD-10-CM

## 2024-04-04 DIAGNOSIS — I10 HYPERTENSION, UNSPECIFIED TYPE: ICD-10-CM

## 2024-04-04 DIAGNOSIS — I48.0 INTERMITTENT ATRIAL FIBRILLATION (HCC): ICD-10-CM

## 2024-04-04 DIAGNOSIS — E78.5 HYPERLIPIDEMIA, UNSPECIFIED HYPERLIPIDEMIA TYPE: ICD-10-CM

## 2024-04-04 DIAGNOSIS — R73.9 HYPERGLYCEMIA: ICD-10-CM

## 2024-04-04 DIAGNOSIS — E55.9 VITAMIN D DEFICIENCY: ICD-10-CM

## 2024-04-04 LAB
25(OH)D3 SERPL-MCNC: 37.6 NG/ML
ALBUMIN SERPL-MCNC: 4.4 G/DL (ref 3.4–5)
ALBUMIN/GLOB SERPL: 2.3 {RATIO} (ref 1.1–2.2)
ALP SERPL-CCNC: 123 U/L (ref 40–129)
ALT SERPL-CCNC: 28 U/L (ref 10–40)
ANION GAP SERPL CALCULATED.3IONS-SCNC: 8 MMOL/L (ref 3–16)
AST SERPL-CCNC: 27 U/L (ref 15–37)
BASOPHILS # BLD: 0.1 K/UL (ref 0–0.2)
BASOPHILS NFR BLD: 1 %
BILIRUB SERPL-MCNC: 0.4 MG/DL (ref 0–1)
BUN SERPL-MCNC: 20 MG/DL (ref 7–20)
CALCIUM SERPL-MCNC: 9.8 MG/DL (ref 8.3–10.6)
CHLORIDE SERPL-SCNC: 103 MMOL/L (ref 99–110)
CHOLEST SERPL-MCNC: 167 MG/DL (ref 0–199)
CO2 SERPL-SCNC: 28 MMOL/L (ref 21–32)
CREAT SERPL-MCNC: 1.3 MG/DL (ref 0.8–1.3)
DEPRECATED RDW RBC AUTO: 13.5 % (ref 12.4–15.4)
EOSINOPHIL # BLD: 0.1 K/UL (ref 0–0.6)
EOSINOPHIL NFR BLD: 2 %
GFR SERPLBLD CREATININE-BSD FMLA CKD-EPI: 59 ML/MIN/{1.73_M2}
GLUCOSE SERPL-MCNC: 92 MG/DL (ref 70–99)
HCT VFR BLD AUTO: 48.6 % (ref 40.5–52.5)
HDLC SERPL-MCNC: 55 MG/DL (ref 40–60)
HGB BLD-MCNC: 16.2 G/DL (ref 13.5–17.5)
LDLC SERPL CALC-MCNC: 86 MG/DL
LYMPHOCYTES # BLD: 1.3 K/UL (ref 1–5.1)
LYMPHOCYTES NFR BLD: 23.8 %
MCH RBC QN AUTO: 30 PG (ref 26–34)
MCHC RBC AUTO-ENTMCNC: 33.3 G/DL (ref 31–36)
MCV RBC AUTO: 89.9 FL (ref 80–100)
MONOCYTES # BLD: 0.6 K/UL (ref 0–1.3)
MONOCYTES NFR BLD: 10.6 %
NEUTROPHILS # BLD: 3.5 K/UL (ref 1.7–7.7)
NEUTROPHILS NFR BLD: 62.6 %
PLATELET # BLD AUTO: 229 K/UL (ref 135–450)
PMV BLD AUTO: 8.4 FL (ref 5–10.5)
POTASSIUM SERPL-SCNC: 4.5 MMOL/L (ref 3.5–5.1)
PROT SERPL-MCNC: 6.3 G/DL (ref 6.4–8.2)
RBC # BLD AUTO: 5.41 M/UL (ref 4.2–5.9)
SODIUM SERPL-SCNC: 139 MMOL/L (ref 136–145)
TRIGL SERPL-MCNC: 129 MG/DL (ref 0–150)
VLDLC SERPL CALC-MCNC: 26 MG/DL
WBC # BLD AUTO: 5.6 K/UL (ref 4–11)

## 2024-04-05 LAB
EST. AVERAGE GLUCOSE BLD GHB EST-MCNC: 108.3 MG/DL
HBA1C MFR BLD: 5.4 %

## 2024-04-06 SDOH — HEALTH STABILITY: PHYSICAL HEALTH: ON AVERAGE, HOW MANY DAYS PER WEEK DO YOU ENGAGE IN MODERATE TO STRENUOUS EXERCISE (LIKE A BRISK WALK)?: 0 DAYS

## 2024-04-06 ASSESSMENT — PATIENT HEALTH QUESTIONNAIRE - PHQ9
SUM OF ALL RESPONSES TO PHQ QUESTIONS 1-9: 0
1. LITTLE INTEREST OR PLEASURE IN DOING THINGS: NOT AT ALL
SUM OF ALL RESPONSES TO PHQ QUESTIONS 1-9: 0
SUM OF ALL RESPONSES TO PHQ9 QUESTIONS 1 & 2: 0
2. FEELING DOWN, DEPRESSED OR HOPELESS: NOT AT ALL

## 2024-04-06 ASSESSMENT — LIFESTYLE VARIABLES
HOW MANY STANDARD DRINKS CONTAINING ALCOHOL DO YOU HAVE ON A TYPICAL DAY: 1 OR 2
HOW OFTEN DO YOU HAVE A DRINK CONTAINING ALCOHOL: 2-3 TIMES A WEEK

## 2024-04-16 ENCOUNTER — HOSPITAL ENCOUNTER (OUTPATIENT)
Age: 69
Discharge: HOME | End: 2024-04-16
Payer: MEDICARE

## 2024-04-16 ENCOUNTER — HOSPITAL ENCOUNTER (OUTPATIENT)
Dept: GENERAL RADIOLOGY | Age: 69
Discharge: HOME | End: 2024-04-16
Payer: MEDICARE

## 2024-04-16 DIAGNOSIS — N20.0 CALCULUS OF KIDNEY: ICD-10-CM

## 2024-04-16 PROCEDURE — 74018 RADEX ABDOMEN 1 VIEW: CPT

## 2024-04-16 SDOH — HEALTH STABILITY: PHYSICAL HEALTH: ON AVERAGE, HOW MANY DAYS PER WEEK DO YOU ENGAGE IN MODERATE TO STRENUOUS EXERCISE (LIKE A BRISK WALK)?: 0 DAYS

## 2024-04-16 ASSESSMENT — PATIENT HEALTH QUESTIONNAIRE - PHQ9
1. LITTLE INTEREST OR PLEASURE IN DOING THINGS: NOT AT ALL
2. FEELING DOWN, DEPRESSED OR HOPELESS: NOT AT ALL
SUM OF ALL RESPONSES TO PHQ QUESTIONS 1-9: 0
SUM OF ALL RESPONSES TO PHQ9 QUESTIONS 1 & 2: 0
SUM OF ALL RESPONSES TO PHQ QUESTIONS 1-9: 0

## 2024-04-16 ASSESSMENT — LIFESTYLE VARIABLES
HOW OFTEN DO YOU HAVE A DRINK CONTAINING ALCOHOL: 4
HOW OFTEN DO YOU HAVE SIX OR MORE DRINKS ON ONE OCCASION: 1
HOW MANY STANDARD DRINKS CONTAINING ALCOHOL DO YOU HAVE ON A TYPICAL DAY: 1 OR 2
HOW OFTEN DO YOU HAVE A DRINK CONTAINING ALCOHOL: 2-3 TIMES A WEEK
HOW MANY STANDARD DRINKS CONTAINING ALCOHOL DO YOU HAVE ON A TYPICAL DAY: 1

## 2024-04-17 ENCOUNTER — OFFICE VISIT (OUTPATIENT)
Dept: FAMILY MEDICINE CLINIC | Age: 69
End: 2024-04-17
Payer: MEDICARE

## 2024-04-17 VITALS
BODY MASS INDEX: 30.52 KG/M2 | SYSTOLIC BLOOD PRESSURE: 110 MMHG | DIASTOLIC BLOOD PRESSURE: 70 MMHG | HEIGHT: 71 IN | WEIGHT: 218 LBS | OXYGEN SATURATION: 97 % | HEART RATE: 50 BPM

## 2024-04-17 DIAGNOSIS — N20.0 RIGHT NEPHROLITHIASIS: ICD-10-CM

## 2024-04-17 DIAGNOSIS — K57.30 COLON, DIVERTICULOSIS: ICD-10-CM

## 2024-04-17 DIAGNOSIS — Z00.00 MEDICARE ANNUAL WELLNESS VISIT, SUBSEQUENT: ICD-10-CM

## 2024-04-17 DIAGNOSIS — I48.0 INTERMITTENT ATRIAL FIBRILLATION (HCC): ICD-10-CM

## 2024-04-17 DIAGNOSIS — G47.33 OSA ON CPAP: ICD-10-CM

## 2024-04-17 DIAGNOSIS — E78.5 HYPERLIPIDEMIA, UNSPECIFIED HYPERLIPIDEMIA TYPE: Primary | ICD-10-CM

## 2024-04-17 DIAGNOSIS — C61 PROSTATE CA (HCC): ICD-10-CM

## 2024-04-17 PROCEDURE — 1123F ACP DISCUSS/DSCN MKR DOCD: CPT | Performed by: FAMILY MEDICINE

## 2024-04-17 PROCEDURE — 3017F COLORECTAL CA SCREEN DOC REV: CPT | Performed by: FAMILY MEDICINE

## 2024-04-17 PROCEDURE — G0439 PPPS, SUBSEQ VISIT: HCPCS | Performed by: FAMILY MEDICINE

## 2024-04-17 PROCEDURE — 3074F SYST BP LT 130 MM HG: CPT | Performed by: FAMILY MEDICINE

## 2024-04-17 PROCEDURE — 3078F DIAST BP <80 MM HG: CPT | Performed by: FAMILY MEDICINE

## 2024-04-17 RX ORDER — INCLISIRAN 284 MG/1.5ML
284 INJECTION, SOLUTION SUBCUTANEOUS ONCE
COMMUNITY

## 2024-04-17 NOTE — PROGRESS NOTES
(Otolaryngology)     Reviewed and updated this visit:  Tobacco  Allergies  Meds  Med Hx  Surg Hx  Soc Hx  Fam Hx

## 2024-05-01 NOTE — PROGRESS NOTES
[facility]   Electrophysiology Follow up   Date: 4/30/2024  I had the privilege of visiting Dk Alba in the office.     CC:  HPI: Dk Alba is a 68 y.o. male with a PMH of HLD and asthma. He was initially seen to discuss an ILR implantation at the request of Dr. Rodriguez. He was seen at [facility] with concerns for a TIA .      He was found to have a PFO and has had 2 episodes of TIA in the past.     Also reported 2 episodes of what he described as irregular heart beat with rate from 120-60 for almost an hour 6 and 12 months ago.     Both his event monitor and Holters have shown short atrial runs.    ILR replaced 6/20/2023.    Interval History:   Zacarias presents to the office in follow up.          Assessment and plan:   Paroxysmal Atrial fibrillation, symptomatic   -ECG today shows Sinus rhythm    -1.1% AT/AF burden per device interrogation today.    -Continue Cardizem 180 mg daily   -Patient has a [id number] Score of 4  (age, HTN,TIA )continue Eliquis 5 mg BID, creatinine 1.3 03/10/2023        -Ablation was been discussed at prior visits and discussed again at this office visit. Will consider if burden worsens. Medical options are limited due to baseline bradycardia. He would like to continue with medical treatment at this time.   -Afib burden remains low at 1.1%. If his episodes become longer, more frequent or increase in symptoms we can consider an ablation at that time. Encouraged him to continue risk factor modifications including compliance with his CPAP, activity, maintaining a healthy weight and  complete cessation of alcohol if possible. . Discussed that over time afib can cause changes in his heart and it is better to do the ablation sooner to prevent irreversible changes. We will continue to monitor his burden and symptoms with his new CPAP.     We will monitor to see if his burden changes.  I explained to him that even a relatively small increasing burden may suggest 
cardiovascular risk factor modification.   - The patient is counseled to avoid excess caffeine, and energy drinks as this may exacerbated ectopy and arrhythmia.   - The patient is counseled to get regular exercise 3-5 times per week to control cardiovascular risk factors.       All questions and concerns were addressed to the patient/family. Alternatives to my treatment were discussed. I have discussed the above stated plan and the patient verbalized understanding and agreed with the plan.    Scribe attestation: This note was scribed in the presence of Albert Pearce MD by Ami Luna RN    I, Dr. Albert Pearce personally performed the services described in this documentation as scribed by RN in my presence, and it is both accurate and complete.           NOTE: This report was transcribed using voice recognition software. Every effort was made to ensure accuracy, however, inadvertent computerized transcription errors may be present.     Albert Pearce MD, RS, [facility]   Office: [phone]  Fax: [phone]

## 2024-05-02 ENCOUNTER — OFFICE VISIT (OUTPATIENT)
Dept: CARDIOLOGY CLINIC | Age: 69
End: 2024-05-02
Payer: MEDICARE

## 2024-05-02 ENCOUNTER — NURSE ONLY (OUTPATIENT)
Dept: CARDIOLOGY CLINIC | Age: 69
End: 2024-05-02

## 2024-05-02 VITALS
SYSTOLIC BLOOD PRESSURE: 122 MMHG | HEIGHT: 71 IN | OXYGEN SATURATION: 95 % | HEART RATE: 52 BPM | BODY MASS INDEX: 28.18 KG/M2 | WEIGHT: 201.3 LBS | DIASTOLIC BLOOD PRESSURE: 76 MMHG

## 2024-05-02 DIAGNOSIS — I47.10 SVT (SUPRAVENTRICULAR TACHYCARDIA) (HCC): ICD-10-CM

## 2024-05-02 DIAGNOSIS — I49.3 PVC (PREMATURE VENTRICULAR CONTRACTION): ICD-10-CM

## 2024-05-02 DIAGNOSIS — I49.1 PAC (PREMATURE ATRIAL CONTRACTION): ICD-10-CM

## 2024-05-02 DIAGNOSIS — Q21.12 PFO (PATENT FORAMEN OVALE): ICD-10-CM

## 2024-05-02 DIAGNOSIS — I48.0 PAF (PAROXYSMAL ATRIAL FIBRILLATION) (HCC): Primary | ICD-10-CM

## 2024-05-02 DIAGNOSIS — G47.33 OSA (OBSTRUCTIVE SLEEP APNEA): ICD-10-CM

## 2024-05-02 DIAGNOSIS — G45.9 TIA (TRANSIENT ISCHEMIC ATTACK): ICD-10-CM

## 2024-05-02 DIAGNOSIS — I10 HYPERTENSION, UNSPECIFIED TYPE: ICD-10-CM

## 2024-05-02 DIAGNOSIS — E78.5 HYPERLIPIDEMIA, UNSPECIFIED HYPERLIPIDEMIA TYPE: ICD-10-CM

## 2024-05-02 PROCEDURE — G8427 DOCREV CUR MEDS BY ELIG CLIN: HCPCS | Performed by: INTERNAL MEDICINE

## 2024-05-02 PROCEDURE — 99214 OFFICE O/P EST MOD 30 MIN: CPT | Performed by: INTERNAL MEDICINE

## 2024-05-02 PROCEDURE — 3074F SYST BP LT 130 MM HG: CPT | Performed by: INTERNAL MEDICINE

## 2024-05-02 PROCEDURE — 3078F DIAST BP <80 MM HG: CPT | Performed by: INTERNAL MEDICINE

## 2024-05-02 PROCEDURE — G8417 CALC BMI ABV UP PARAM F/U: HCPCS | Performed by: INTERNAL MEDICINE

## 2024-05-02 PROCEDURE — 1123F ACP DISCUSS/DSCN MKR DOCD: CPT | Performed by: INTERNAL MEDICINE

## 2024-05-02 PROCEDURE — 3017F COLORECTAL CA SCREEN DOC REV: CPT | Performed by: INTERNAL MEDICINE

## 2024-05-02 PROCEDURE — 93000 ELECTROCARDIOGRAM COMPLETE: CPT | Performed by: INTERNAL MEDICINE

## 2024-05-02 PROCEDURE — 1036F TOBACCO NON-USER: CPT | Performed by: INTERNAL MEDICINE

## 2024-05-02 NOTE — PROGRESS NOTES
Patient comes in for their OV with Dr. Pearce today.   Patient has Medtronic Reveal LNQ22 LINQ II on 6/20/2023 with Dr. Pearce.     Carelink Interrogation shows a Battery Status GOOD. Since 4/4/2024 1 AF episode noted on 4/6/2024 lasting 9 1/2 hours with  1.4% burden. 0.4% PVC burden. Implanted for AF management. Patient remains Eliquis and Cardizem. Please see interrogation scanned under media tab for more detail. We will continue to follow the Patient remotely.

## 2024-08-03 NOTE — TELEPHONE ENCOUNTER
Pt states he is still waiting for a call back. He is still unable to get a hold of billing. He states he will wait till Monday and if he doesn't hear back he will call the pt advocate line to see if he can get help. Please call to advise.   6/17/24

## 2024-10-02 ENCOUNTER — TELEPHONE (OUTPATIENT)
Dept: CARDIOLOGY CLINIC | Age: 69
End: 2024-10-02

## 2024-10-02 NOTE — TELEPHONE ENCOUNTER
The patient called stating he has an appointment with Dr. Caputo on 10/29/24 and he would like to know if he should have any testing or labs done prior to his appointment. Please call the patient at [phone] to advise.

## 2024-10-02 NOTE — TELEPHONE ENCOUNTER
LVM for pt to let him no labs needed    Héctor Caputo MD   to Me     10/2/24  3:20 PM  No new labs needed

## 2024-10-11 DIAGNOSIS — E78.5 HYPERLIPIDEMIA, UNSPECIFIED HYPERLIPIDEMIA TYPE: Primary | ICD-10-CM

## 2024-10-14 LAB
ALBUMIN SERPL-MCNC: 4.3 G/DL (ref 3.4–5)
ALBUMIN/GLOB SERPL: 2.4 {RATIO} (ref 1.1–2.2)
ALP SERPL-CCNC: 102 U/L (ref 40–129)
ALT SERPL-CCNC: 20 U/L (ref 10–40)
ANION GAP SERPL CALCULATED.3IONS-SCNC: 8 MMOL/L (ref 3–16)
AST SERPL-CCNC: 25 U/L (ref 15–37)
BILIRUB DIRECT SERPL-MCNC: 0.3 MG/DL (ref 0–0.3)
BILIRUB INDIRECT SERPL-MCNC: 0.3 MG/DL (ref 0–1)
BILIRUB SERPL-MCNC: 0.6 MG/DL (ref 0–1)
BUN SERPL-MCNC: 16 MG/DL (ref 7–20)
CALCIUM SERPL-MCNC: 9.6 MG/DL (ref 8.3–10.6)
CHLORIDE SERPL-SCNC: 105 MMOL/L (ref 99–110)
CHOLEST SERPL-MCNC: 136 MG/DL (ref 0–199)
CO2 SERPL-SCNC: 27 MMOL/L (ref 21–32)
CREAT SERPL-MCNC: 1.1 MG/DL (ref 0.8–1.3)
GFR SERPLBLD CREATININE-BSD FMLA CKD-EPI: 72 ML/MIN/{1.73_M2}
GLUCOSE SERPL-MCNC: 85 MG/DL (ref 70–99)
HDLC SERPL-MCNC: 57 MG/DL (ref 40–60)
LDLC SERPL CALC-MCNC: 63 MG/DL
POTASSIUM SERPL-SCNC: 4.4 MMOL/L (ref 3.5–5.1)
PROT SERPL-MCNC: 6.1 G/DL (ref 6.4–8.2)
SODIUM SERPL-SCNC: 140 MMOL/L (ref 136–145)
TRIGL SERPL-MCNC: 80 MG/DL (ref 0–150)
VLDLC SERPL CALC-MCNC: 16 MG/DL

## 2024-10-28 ASSESSMENT — ENCOUNTER SYMPTOMS
EYE DISCHARGE: 0
CHEST TIGHTNESS: 0
ABDOMINAL PAIN: 0
BACK PAIN: 0
FACIAL SWELLING: 0
COLOR CHANGE: 0
SHORTNESS OF BREATH: 0
VOMITING: 0
BLOOD IN STOOL: 0
COUGH: 0
WHEEZING: 0
ABDOMINAL DISTENTION: 0

## 2024-10-28 NOTE — PROGRESS NOTES
80 10/14/2024    TRIG 129 04/04/2024    TRIG 75 12/12/2023     Lab Results   Component Value Date    HDL 57 10/14/2024    HDL 55 04/04/2024    HDL 57 12/12/2023     No components found for: \"LDLCHOLESTEROL\", \"LDLCALC\"    Lab Results   Component Value Date    VLDL 16 10/14/2024    VLDL 26 04/04/2024    VLDL 15 12/12/2023     No results found for: \"CHOLHDLRATIO\"    Lab Results   Component Value Date    INR 2.53 10/18/2019    INR 0.94 08/29/2013    INR 0.88 (L) 08/19/2013    PROTIME 25.4 10/18/2019    PROTIME 12.4 08/29/2013    PROTIME 11.8 08/19/2013       The 10-year ASCVD risk score (Nanda CARLIN, et al., 2019) is: 13.9%    Values used to calculate the score:      Age: 69 years      Sex: Male      Is Non- : No      Diabetic: No      Tobacco smoker: No      Systolic Blood Pressure: 110 mmHg      Is BP treated: Yes      HDL Cholesterol: 57 mg/dL      Total Cholesterol: 136 mg/dL      Imaging:       Last ECG (if available, Personally interpreted):  Sinus bradycardia, first-degree block    Last Monitor/Holter  [facility] Holter report: PAC, PVC, 3 episodes of SVT     Last Stress (if available):    Last Cath (if available):    Last TTE/GEREMIAS(if available): 6/2/23  Normal left ventricle size, wall thickness, and systolic function with an estimated ejection fraction of 55-60%. -No regional wall motion abnormalities are seen. -Normal diastolic function.E/e\"=8.3.-The aortic valve appears sclerotic but opens well -Trivial aortic regurgitation.  -Mild tricuspid regurgitation. -Estimated pulmonary artery systolic pressure is normal at 31 mmHg assuming a right atrial pressure of 3 mmHg. -There is an aneurysmal interatrial septum with no evidence of shunting but color flow interrogation. -Bubbles study not available at this facility, however previous echocardiogram performed on 9/25/2019 at outside facility showed positive right to left shunt by bubbles study. 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

## 2024-10-29 ENCOUNTER — OFFICE VISIT (OUTPATIENT)
Dept: CARDIOLOGY CLINIC | Age: 69
End: 2024-10-29

## 2024-10-29 VITALS
WEIGHT: 184.2 LBS | SYSTOLIC BLOOD PRESSURE: 110 MMHG | BODY MASS INDEX: 25.7 KG/M2 | DIASTOLIC BLOOD PRESSURE: 60 MMHG | HEART RATE: 58 BPM

## 2024-10-29 DIAGNOSIS — I10 PRIMARY HYPERTENSION: ICD-10-CM

## 2024-10-29 DIAGNOSIS — I48.0 INTERMITTENT ATRIAL FIBRILLATION (HCC): ICD-10-CM

## 2024-10-29 DIAGNOSIS — E78.2 MIXED HYPERLIPIDEMIA: ICD-10-CM

## 2024-10-29 DIAGNOSIS — Q21.12 PFO (PATENT FORAMEN OVALE): Primary | ICD-10-CM

## 2024-10-29 RX ORDER — AMPICILLIN TRIHYDRATE 250 MG
CAPSULE ORAL
COMMUNITY

## 2024-12-17 NOTE — TELEPHONE ENCOUNTER
Requested Prescriptions     Pending Prescriptions Disp Refills    losartan (COZAAR) 100 MG tablet [Pharmacy Med Name: LOSARTAN TAB 100MG] 90 tablet 3     Sig: TAKE 1 TABLET DAILY            Checked Correct Pharmacy: Yes    Any changes since last refill? No     Number: 90    Refills: 3    Last Office Visit: 11/9/2022     Next Office Visit: 10.28.2025  Last Labs: 10.14.2024

## 2024-12-18 RX ORDER — LOSARTAN POTASSIUM 100 MG/1
100 TABLET ORAL DAILY
Qty: 90 TABLET | Refills: 3 | Status: SHIPPED | OUTPATIENT
Start: 2024-12-18

## 2024-12-20 RX ORDER — DILTIAZEM HYDROCHLORIDE 180 MG/1
CAPSULE, COATED, EXTENDED RELEASE ORAL DAILY
Qty: 90 CAPSULE | Refills: 2 | Status: SHIPPED | OUTPATIENT
Start: 2024-12-20

## 2024-12-20 NOTE — TELEPHONE ENCOUNTER
I called and spoke with Javier; advised her of provider's response/note.    She verbalized understanding and agreement.    Lisa NINO RN  [facility] Triage     Received refill request for  dilTIAZem (CARDIZEM CD) 180 MG extended release capsule  from [facility] pharmacy.     Last OV: 10/29/2024     Next OV: 10/28/2025     Last Labs: 5/2/2024 EKG    Last Filled: 3/15/2024 NPSR

## 2024-12-20 NOTE — TELEPHONE ENCOUNTER
Pt would like the script for D[facility]iazem [facility] [place] - [facility] - P [phone] - F [phone] instead of [facility] on [address]

## 2024-12-23 ENCOUNTER — TELEPHONE (OUTPATIENT)
Dept: CARDIOLOGY CLINIC | Age: 69
End: 2024-12-23

## 2024-12-23 RX ORDER — DILTIAZEM HYDROCHLORIDE 180 MG/1
180 CAPSULE, COATED, EXTENDED RELEASE ORAL DAILY
Qty: 90 CAPSULE | Refills: 2 | Status: SHIPPED | OUTPATIENT
Start: 2024-12-23

## 2024-12-23 NOTE — TELEPHONE ENCOUNTER
Medication Refill    Medication needing refilled:  dilTIAZem (CARDIZEM CD) 180 MG extended release capsule   Dosage of the medication:    How are you taking this medication (QD, BID, TID, QID, PRN):  TAKE 1 CAPSULE BY MOUTH EVERY DAY   30 or 90 day supply called in:90    When will you run out of your medication:  **A request was sent 12/20 to sent to the  [facility] and shouldn't come from [facility]. Pt has enough to last until 1/5/25 then will be out of medication.  Which Pharmacy are we sending the medication to?:  [facility] - [place] - [address] - P [phone] - F [phone]

## 2024-12-24 ENCOUNTER — TELEPHONE (OUTPATIENT)
Dept: CARDIOLOGY CLINIC | Age: 69
End: 2024-12-24

## 2024-12-24 NOTE — TELEPHONE ENCOUNTER
Medication refill:     Medication  losartan (COZAAR) 100 MG tablet [54113]  losartan (COZAAR) 100 MG tablet [[id number]]    Order Details  Dose: 100 mg Route: Oral Frequency: DAILY   Dispense Quantity: 90 tablet Refills: 3          Sig: TAKE 1 TABLET DAILY     Pharmacy  [facility] - [place] - [facility] - P [phone] - F [phone]  [facility] [address] [address]  Phone: [phone]  Fax: [phone]     Last visit: 10/29/24  Next visit: 10/28/25  Refill: 12/18/24

## 2024-12-26 RX ORDER — LOSARTAN POTASSIUM 100 MG/1
100 TABLET ORAL DAILY
Qty: 90 TABLET | Refills: 3 | Status: SHIPPED | OUTPATIENT
Start: 2024-12-26

## 2024-12-28 PROCEDURE — 93298 REM INTERROG DEV EVAL SCRMS: CPT | Performed by: INTERNAL MEDICINE

## 2025-01-27 ENCOUNTER — OFFICE VISIT (OUTPATIENT)
Age: 70
End: 2025-01-27
Payer: MEDICARE

## 2025-01-27 DIAGNOSIS — L81.4 LENTIGINES: ICD-10-CM

## 2025-01-27 DIAGNOSIS — Z85.828 HISTORY OF NONMELANOMA SKIN CANCER: Primary | ICD-10-CM

## 2025-01-27 DIAGNOSIS — D17.1 LIPOMA OF CHEST WALL: ICD-10-CM

## 2025-01-27 DIAGNOSIS — D22.9 MULTIPLE NEVI: ICD-10-CM

## 2025-01-27 DIAGNOSIS — D48.5 NEOPLASM OF UNCERTAIN BEHAVIOR OF SKIN: ICD-10-CM

## 2025-01-27 DIAGNOSIS — R21 RASH: ICD-10-CM

## 2025-01-27 DIAGNOSIS — L82.1 SEBORRHEIC KERATOSIS: ICD-10-CM

## 2025-01-27 DIAGNOSIS — L05.91 PILONIDAL CYST: ICD-10-CM

## 2025-01-27 DIAGNOSIS — L57.0 AK (ACTINIC KERATOSIS): ICD-10-CM

## 2025-01-27 PROCEDURE — 1036F TOBACCO NON-USER: CPT | Performed by: DERMATOLOGY

## 2025-01-27 PROCEDURE — 1123F ACP DISCUSS/DSCN MKR DOCD: CPT | Performed by: DERMATOLOGY

## 2025-01-27 PROCEDURE — 99214 OFFICE O/P EST MOD 30 MIN: CPT | Performed by: DERMATOLOGY

## 2025-01-27 PROCEDURE — G8427 DOCREV CUR MEDS BY ELIG CLIN: HCPCS | Performed by: DERMATOLOGY

## 2025-01-27 PROCEDURE — G8417 CALC BMI ABV UP PARAM F/U: HCPCS | Performed by: DERMATOLOGY

## 2025-01-27 PROCEDURE — 1160F RVW MEDS BY RX/DR IN RCRD: CPT | Performed by: DERMATOLOGY

## 2025-01-27 PROCEDURE — 99213 OFFICE O/P EST LOW 20 MIN: CPT | Performed by: DERMATOLOGY

## 2025-01-27 PROCEDURE — 3017F COLORECTAL CA SCREEN DOC REV: CPT | Performed by: DERMATOLOGY

## 2025-01-27 PROCEDURE — 11102 TANGNTL BX SKIN SINGLE LES: CPT | Performed by: DERMATOLOGY

## 2025-01-27 PROCEDURE — 1159F MED LIST DOCD IN RCRD: CPT | Performed by: DERMATOLOGY

## 2025-01-27 RX ORDER — DOCUSATE SODIUM 100 MG/1
100 CAPSULE, LIQUID FILLED ORAL 2 TIMES DAILY
COMMUNITY

## 2025-01-27 RX ORDER — MONTELUKAST SODIUM 10 MG/1
TABLET ORAL
COMMUNITY
Start: 2025-01-15

## 2025-01-27 NOTE — PATIENT INSTRUCTIONS
Biopsy Wound Care Instructions    Keep the bandage in place for 24 hours.   Cleanse the wound with mild soapy water daily  Gently dry the area.  Apply Vaseline or petroleum jelly to the wound using a cotton tipped applicator.  Cover with a clean bandage.  Repeat this process until the biopsy site is healed.  If you had stitches placed, continue treating the site until the stitches are removed. Remember to make an appointment to return to have your stitches removed by our staff.  You may shower and bathe as usual.       ** Biopsy results generally take around 7 business days to come back.  If you have not heard from us by then, please call the office at [phone].    *Please note that biopsy results are released to both the patient and physician at the same time in [place].  Please allow time for your physician to review the results.  One of our staff members will reach out to you with the results and plan.

## 2025-01-27 NOTE — PROGRESS NOTES
[facility]  Jessica Flor MD  [phone]      Dk Alba  1955    69 y.o. male     Date of Visit: 1/27/2025    Last seen:     Chief Complaint:   Chief Complaint   Patient presents with    Skin Exam     *had a TIA in the summer 2019 - on plavix now  *went to [place]    History of Present Illness  The patient is a 69-year-old male presenting for follow-up regarding nonmelanoma skin cancer, multiple nevi, and actinic keratosis. He has a history of a lipoma located on the left chest/shoulder, previously evaluated.    F/u Nonmelanoma Skin Cancer  - No complications related to prior basal cell carcinoma on the right nasal sidewall, diagnosed in June 2015.    Rash - neck  - Caused by issues with his CPAP headband.  - Use of fuzzy covers over the headband has provided some relief.    ? Pilonidal Cyst  - describes dimple/poor in the sacral area for many years, previously discussed with his primary care physician.  - Area became inflamed approximately one month ago, resulting in discomfort while sitting.  - Application of Neosporin and a Band-Aid led to improvement.  - Currently asymptomatic but requests an examination.    Lipoma  - Located near his left shoulder.  - Remains asymptomatic with no associated pain.    Actinic Keratosis  -few lesions on the L temple    Coldness in Feet  - Persistent coldness in his feet.  - Necessitates the use of thick socks and an electric blanket even during sleep.  - Expresses concern about potential dermatological issues.    He has a concerning brown spot on the frontal scalp.  Asymptomatic.  He was not aware of it.      S/p Mohs 6-2015 for nod BCC on the R nasal sidewall and then scar revision.   S/p 5-FU for R nasal dorsum AK (bx'd  - read as HAK, transected at deep margin).     *R extensor FA - blue nevus - bx   *L upper back/shoulder - benign lentigo - bx 2022       Previously discussed flushing with niacin.     Review of

## 2025-01-30 LAB — DERMATOLOGY PATHOLOGY REPORT: NORMAL

## 2025-01-31 ENCOUNTER — PATIENT MESSAGE (OUTPATIENT)
Age: 70
End: 2025-01-31

## 2025-02-08 ENCOUNTER — PATIENT MESSAGE (OUTPATIENT)
Age: 70
End: 2025-02-08

## 2025-03-19 RX ORDER — DABIGATRAN ETEXILATE 150 MG/1
150 CAPSULE ORAL 2 TIMES DAILY
Qty: 180 CAPSULE | Refills: 3 | Status: SHIPPED | OUTPATIENT
Start: 2025-03-19

## 2025-03-19 RX ORDER — DILTIAZEM HYDROCHLORIDE 180 MG/1
180 CAPSULE, COATED, EXTENDED RELEASE ORAL DAILY
Qty: 90 CAPSULE | Refills: 2 | Status: SHIPPED | OUTPATIENT
Start: 2025-03-19

## 2025-03-19 NOTE — TELEPHONE ENCOUNTER
Medication Refill    Medication needing refilled:  dabigatran      Dosage of the medication: 150mg    How are you taking this medication (QD, BID, TID, QID, PRN):   Take 1 capsule by mouth 2 times daily     30 or 90 day supply called in: 180    When will you run out of your medication:    Which Pharmacy are we sending the medication to?:  [facility]  [facility],  [place] [address]   Phone: [phone]  Fax:  [phone]

## 2025-03-19 NOTE — TELEPHONE ENCOUNTER
Medication Refill    Medication needing refilled:  dilTIAZem     Dosage of the medication:   90  w/3 refills      How are you taking this medication (QD, BID, TID, QID, PRN):   Take 1 capsule by mouth daily     30 or 90 day supply called in:   90    When will you run out of your medication:    Which Pharmacy are we sending the medication to?:     [facility] [address]   [facility] [facility] [address]   Phone:   [phone]   Fax:   [phone]

## 2025-04-14 NOTE — PROGRESS NOTES
ventricular inflow profile.   There are no significant segmental wall motion abnormalities.   The left ventricular ejection fraction is calculated at 75 %.    GXT: None    Assessment:    1. Paroxysmal Atrial Fibrillation  - Currently in NSR/SB  - Continue cardizem 180 mg QD   ~ Limited in medical therapy due to low resting HR/RBBB    - [id number] score: 3 (Age, HTN, TIA) ; [id number] Vasc score and anticoagulation discussed. High risk for stroke and thromboembolism. Anticoagulation is recommended. Risk of bleeding was discussed.  ~ Continue Pradaxa 150 mg BID; tolerating well. Monitor for bleeding    - Afib risk factors including age, HTN, obesity, inactivity and ORQUIDEA were discussed with patient. Risk factor modification recommended      - His AF burden is ~ 1%, which is similar to his burden last year when he saw Dr. Pearce. Pt will consider an ablation if his burden worsens as medication options are limited given his low baseline HR and RBBB. At this point, his episodes are very infrequent and he does not seem very symptomatic with the episodes, unless the rate is elevated. Recommend he continue to be compliant with his CPAP and work on lifestyle modification, including weight loss    2. Implantable Loop Recorder  - S/p ILR insertion on 2023  -The CIED was interrogated and programmed and I supervised and reviewed all the data. All findings and changes are in device interrogation sheat and reflect my personal interpretation and changes and is scanned to Epic  - Device shows: PAF burden 1% (longest 10.5 hours in January)  - Follow up with device clinic as scheduled    3. SVT   - Noted on loop. Seems to by symptomatic with the episode in January   - Continue CCB    4.RBBB, 1st degree AVB   - Noted on EKG   - Monitor for worsening conduction dz    5. HTN  - Controlled: Goal <130/80  - Continue current medications  - Discussed importance of low sodium diet, weight control and exercise    6. ORQUIDEA  - Stable: Uses CPAP  -

## 2025-04-15 PROBLEM — D17.22 LIPOMA OF LEFT UPPER EXTREMITY: Status: RESOLVED | Noted: 2021-08-13 | Resolved: 2025-04-15

## 2025-04-15 PROBLEM — M67.479 GANGLION CYST OF FOOT: Status: RESOLVED | Noted: 2019-12-04 | Resolved: 2025-04-15

## 2025-04-16 RX ORDER — LOSARTAN POTASSIUM 100 MG/1
100 TABLET ORAL DAILY
Qty: 90 TABLET | Refills: 3 | Status: SHIPPED | OUTPATIENT
Start: 2025-04-16

## 2025-04-16 NOTE — TELEPHONE ENCOUNTER
Requested Prescriptions     Pending Prescriptions Disp Refills    losartan (COZAAR) 100 MG tablet 90 tablet 3     Sig: Take 1 tablet by mouth daily            Checked Correct Pharmacy: Yes    Any changes since last refill? No     Number: 90  Refills: 3    Last OV: 10.29.2024 Provider: NAYE    Next OV: 10.28.2025Provider: NAYE    Last Labs:   CMP:   Lab Results   Component Value Date     10/14/2024    K 4.4 10/14/2024     10/14/2024    CO2 27 10/14/2024    BUN 16 10/14/2024    CREATININE 1.1 10/14/2024    GLUCOSE 85 10/14/2024    CALCIUM 9.6 10/14/2024    BILITOT 0.6 10/14/2024    ALKPHOS 102 10/14/2024    AST 25 10/14/2024    ALT 20 10/14/2024    LABGLOM 72 10/14/2024    GFRAA >60 12/03/2021    AGRATIO 2.4 (H) 10/14/2024    GLOB 1.4 12/03/2020          Liver:   Lab Results   Component Value Date    ALT 20 10/14/2024    AST 25 10/14/2024    ALKPHOS 102 10/14/2024    BILITOT 0.6 10/14/2024

## 2025-04-21 SDOH — HEALTH STABILITY: PHYSICAL HEALTH: ON AVERAGE, HOW MANY DAYS PER WEEK DO YOU ENGAGE IN MODERATE TO STRENUOUS EXERCISE (LIKE A BRISK WALK)?: 0 DAYS

## 2025-04-21 SDOH — ECONOMIC STABILITY: INCOME INSECURITY: IN THE LAST 12 MONTHS, WAS THERE A TIME WHEN YOU WERE NOT ABLE TO PAY THE MORTGAGE OR RENT ON TIME?: NO

## 2025-04-21 SDOH — ECONOMIC STABILITY: FOOD INSECURITY: WITHIN THE PAST 12 MONTHS, YOU WORRIED THAT YOUR FOOD WOULD RUN OUT BEFORE YOU GOT MONEY TO BUY MORE.: NEVER TRUE

## 2025-04-21 SDOH — ECONOMIC STABILITY: FOOD INSECURITY: WITHIN THE PAST 12 MONTHS, THE FOOD YOU BOUGHT JUST DIDN'T LAST AND YOU DIDN'T HAVE MONEY TO GET MORE.: NEVER TRUE

## 2025-04-21 SDOH — HEALTH STABILITY: PHYSICAL HEALTH: ON AVERAGE, HOW MANY MINUTES DO YOU ENGAGE IN EXERCISE AT THIS LEVEL?: 0 MIN

## 2025-04-21 SDOH — ECONOMIC STABILITY: TRANSPORTATION INSECURITY
IN THE PAST 12 MONTHS, HAS LACK OF TRANSPORTATION KEPT YOU FROM MEETINGS, WORK, OR FROM GETTING THINGS NEEDED FOR DAILY LIVING?: NO

## 2025-04-21 SDOH — ECONOMIC STABILITY: TRANSPORTATION INSECURITY
IN THE PAST 12 MONTHS, HAS THE LACK OF TRANSPORTATION KEPT YOU FROM MEDICAL APPOINTMENTS OR FROM GETTING MEDICATIONS?: NO

## 2025-04-21 ASSESSMENT — PATIENT HEALTH QUESTIONNAIRE - PHQ9
1. LITTLE INTEREST OR PLEASURE IN DOING THINGS: NOT AT ALL
2. FEELING DOWN, DEPRESSED OR HOPELESS: SEVERAL DAYS
SUM OF ALL RESPONSES TO PHQ QUESTIONS 1-9: 1

## 2025-04-21 ASSESSMENT — LIFESTYLE VARIABLES
HOW MANY STANDARD DRINKS CONTAINING ALCOHOL DO YOU HAVE ON A TYPICAL DAY: PATIENT DOES NOT DRINK
HOW OFTEN DO YOU HAVE SIX OR MORE DRINKS ON ONE OCCASION: 1
HOW MANY STANDARD DRINKS CONTAINING ALCOHOL DO YOU HAVE ON A TYPICAL DAY: 0
HOW OFTEN DO YOU HAVE A DRINK CONTAINING ALCOHOL: NEVER
HOW OFTEN DO YOU HAVE A DRINK CONTAINING ALCOHOL: 1

## 2025-04-22 ENCOUNTER — OFFICE VISIT (OUTPATIENT)
Dept: FAMILY MEDICINE CLINIC | Age: 70
End: 2025-04-22
Payer: MEDICARE

## 2025-04-22 ENCOUNTER — HOSPITAL ENCOUNTER (OUTPATIENT)
Dept: GENERAL RADIOLOGY | Age: 70
Discharge: HOME | End: 2025-04-22
Payer: MEDICARE

## 2025-04-22 VITALS
SYSTOLIC BLOOD PRESSURE: 110 MMHG | BODY MASS INDEX: 26.21 KG/M2 | HEIGHT: 71 IN | HEART RATE: 55 BPM | DIASTOLIC BLOOD PRESSURE: 70 MMHG | WEIGHT: 187.2 LBS | OXYGEN SATURATION: 96 %

## 2025-04-22 DIAGNOSIS — K57.30 COLON, DIVERTICULOSIS: ICD-10-CM

## 2025-04-22 DIAGNOSIS — N20.0 CALCULUS OF KIDNEY: ICD-10-CM

## 2025-04-22 DIAGNOSIS — J30.89 NON-SEASONAL ALLERGIC RHINITIS DUE TO OTHER ALLERGIC TRIGGER: ICD-10-CM

## 2025-04-22 DIAGNOSIS — Z00.00 MEDICARE ANNUAL WELLNESS VISIT, SUBSEQUENT: Primary | ICD-10-CM

## 2025-04-22 DIAGNOSIS — Q21.12 PFO (PATENT FORAMEN OVALE): ICD-10-CM

## 2025-04-22 DIAGNOSIS — M54.2 NECK PAIN: ICD-10-CM

## 2025-04-22 DIAGNOSIS — Z00.00 WELL ADULT EXAM: ICD-10-CM

## 2025-04-22 DIAGNOSIS — C61 PROSTATE CA (HCC): ICD-10-CM

## 2025-04-22 DIAGNOSIS — I10 PRIMARY HYPERTENSION: ICD-10-CM

## 2025-04-22 DIAGNOSIS — I48.0 INTERMITTENT ATRIAL FIBRILLATION (HCC): ICD-10-CM

## 2025-04-22 PROCEDURE — 1159F MED LIST DOCD IN RCRD: CPT | Performed by: FAMILY MEDICINE

## 2025-04-22 PROCEDURE — 1160F RVW MEDS BY RX/DR IN RCRD: CPT | Performed by: FAMILY MEDICINE

## 2025-04-22 PROCEDURE — 3078F DIAST BP <80 MM HG: CPT | Performed by: FAMILY MEDICINE

## 2025-04-22 PROCEDURE — G0439 PPPS, SUBSEQ VISIT: HCPCS | Performed by: FAMILY MEDICINE

## 2025-04-22 PROCEDURE — 3017F COLORECTAL CA SCREEN DOC REV: CPT | Performed by: FAMILY MEDICINE

## 2025-04-22 PROCEDURE — 74018 RADEX ABDOMEN 1 VIEW: CPT

## 2025-04-22 PROCEDURE — 3074F SYST BP LT 130 MM HG: CPT | Performed by: FAMILY MEDICINE

## 2025-04-22 PROCEDURE — 1123F ACP DISCUSS/DSCN MKR DOCD: CPT | Performed by: FAMILY MEDICINE

## 2025-04-22 NOTE — ASSESSMENT & PLAN NOTE
Chronic, not at goal (unstable), has seen several docs-allergists,ENT and pulmonary----considering further tx

## 2025-04-22 NOTE — PROGRESS NOTES
Medicare Annual Wellness Visit    Dk Alba is here for No chief complaint on file.    Assessment & Plan   Prostate CA (HCC)  Assessment & Plan:   Chronic, at goal (stable), stable--\"watchful waiting\"--see [facility]  Intermittent atrial fibrillation (HCC)  Assessment & Plan:   Chronic, at goal (stable), continue current treatment plan  Colon, diverticulosis  Assessment & Plan:   Chronic, at goal (stable), continue current treatment plan  Primary hypertension  Assessment & Plan:   Chronic, at goal (stable), continue current treatment plan  Non-seasonal allergic rhinitis due to other allergic trigger  Assessment & Plan:   Chronic, not at goal (unstable), has seen several docs-allergists,ENT and pulmonary----considering further tx  PFO (patent foramen ovale)  Assessment & Plan:   Chronic, not at goal (unstable), not treated  Neck pain  Assessment & Plan:   Chronic, not at goal (unstable), wants to see orhto next       No follow-ups on file.     Subjective   The following acute and/or chronic problems were also addressed today:  Prostate CA (HCC)   Chronic, at goal (stable), stable--\"watchful waiting\"--see [facility]    Intermittent atrial fibrillation (HCC)   Chronic, at goal (stable), continue current treatment plan    Colon, diverticulosis   Chronic, at goal (stable), continue current treatment plan    Hypertension   Chronic, at goal (stable), continue current treatment plan    Non-seasonal allergic rhinitis   Chronic, not at goal (unstable), has seen several docs-allergists,ENT and pulmonary----considering further tx    PFO (patent foramen ovale)   Chronic, not at goal (unstable), not treated    Neck pain   Chronic, not at goal (unstable), wants to see orhto next      Patient's complete Health Risk Assessment and screening values have been reviewed and are found in Flowsheets. The following problems were reviewed today and where indicated follow up appointments were made and/or referrals ordered.    Positive Risk Factor

## 2025-05-12 ENCOUNTER — OFFICE VISIT (OUTPATIENT)
Dept: CARDIOLOGY CLINIC | Age: 70
End: 2025-05-12
Payer: MEDICARE

## 2025-05-12 ENCOUNTER — CLINICAL SUPPORT (OUTPATIENT)
Dept: CARDIOLOGY CLINIC | Age: 70
End: 2025-05-12

## 2025-05-12 VITALS
OXYGEN SATURATION: 96 % | DIASTOLIC BLOOD PRESSURE: 70 MMHG | HEART RATE: 67 BPM | BODY MASS INDEX: 26.23 KG/M2 | WEIGHT: 187.4 LBS | SYSTOLIC BLOOD PRESSURE: 112 MMHG | HEIGHT: 71 IN

## 2025-05-12 DIAGNOSIS — I10 PRIMARY HYPERTENSION: ICD-10-CM

## 2025-05-12 DIAGNOSIS — Z45.09 ENCOUNTER FOR LOOP RECORDER CHECK: ICD-10-CM

## 2025-05-12 DIAGNOSIS — R73.9 HYPERGLYCEMIA: Primary | ICD-10-CM

## 2025-05-12 DIAGNOSIS — G47.33 OSA ON CPAP: ICD-10-CM

## 2025-05-12 DIAGNOSIS — I45.10 RBBB: Primary | ICD-10-CM

## 2025-05-12 DIAGNOSIS — I47.10 SVT (SUPRAVENTRICULAR TACHYCARDIA): ICD-10-CM

## 2025-05-12 PROCEDURE — 99214 OFFICE O/P EST MOD 30 MIN: CPT | Performed by: NURSE PRACTITIONER

## 2025-05-12 PROCEDURE — 3017F COLORECTAL CA SCREEN DOC REV: CPT | Performed by: NURSE PRACTITIONER

## 2025-05-12 PROCEDURE — G8417 CALC BMI ABV UP PARAM F/U: HCPCS | Performed by: NURSE PRACTITIONER

## 2025-05-12 PROCEDURE — 1123F ACP DISCUSS/DSCN MKR DOCD: CPT | Performed by: NURSE PRACTITIONER

## 2025-05-12 PROCEDURE — 93000 ELECTROCARDIOGRAM COMPLETE: CPT | Performed by: NURSE PRACTITIONER

## 2025-05-12 PROCEDURE — G8427 DOCREV CUR MEDS BY ELIG CLIN: HCPCS | Performed by: NURSE PRACTITIONER

## 2025-05-12 PROCEDURE — 1159F MED LIST DOCD IN RCRD: CPT | Performed by: NURSE PRACTITIONER

## 2025-05-12 PROCEDURE — 3074F SYST BP LT 130 MM HG: CPT | Performed by: NURSE PRACTITIONER

## 2025-05-12 PROCEDURE — 1036F TOBACCO NON-USER: CPT | Performed by: NURSE PRACTITIONER

## 2025-05-12 PROCEDURE — G2211 COMPLEX E/M VISIT ADD ON: HCPCS | Performed by: NURSE PRACTITIONER

## 2025-05-12 PROCEDURE — 3078F DIAST BP <80 MM HG: CPT | Performed by: NURSE PRACTITIONER

## 2025-05-12 PROCEDURE — 1126F AMNT PAIN NOTED NONE PRSNT: CPT | Performed by: NURSE PRACTITIONER

## 2025-05-12 NOTE — PROGRESS NOTES
Patient comes in for their OV with NPSR today.   Patient has Medtronic Reveal LNQ22 LINQ II on 6/20/2023 with Dr. Pearce.      Carelink Interrogation shows the Battery Status is GOOD. Since 5/2/2024 36 AF episodes noted with a 1.0% burden. Last noted on 4/18/2025 lasting 8 hours. Patient symptomatic. 0.7% PVC burden. Implanted for AF management. Patient remains Pradaxa and Cardizem. Please see interrogation scanned under media tab for more detail. We will continue to follow the Patient remotely.

## 2025-05-22 PROCEDURE — 93298 REM INTERROG DEV EVAL SCRMS: CPT | Performed by: INTERNAL MEDICINE

## 2025-05-30 NOTE — PROGRESS NOTES
Carelink remote Linq report shows short SVT. AF recordings are not real. These show NSR with ectopy. We will continue to monitor remotely.
OT evaluate and treat.

## 2025-07-14 ENCOUNTER — PATIENT MESSAGE (OUTPATIENT)
Dept: CARDIOLOGY CLINIC | Age: 70
End: 2025-07-14

## 2025-07-15 ENCOUNTER — OFFICE VISIT (OUTPATIENT)
Dept: ORTHOPEDIC SURGERY | Age: 70
End: 2025-07-15
Payer: MEDICARE

## 2025-07-15 VITALS — BODY MASS INDEX: 27.35 KG/M2 | HEIGHT: 70 IN | WEIGHT: 191 LBS

## 2025-07-15 DIAGNOSIS — M79.89 CALF SWELLING: ICD-10-CM

## 2025-07-15 DIAGNOSIS — M25.572 ACUTE LEFT ANKLE PAIN: Primary | ICD-10-CM

## 2025-07-15 PROCEDURE — G8427 DOCREV CUR MEDS BY ELIG CLIN: HCPCS | Performed by: ORTHOPAEDIC SURGERY

## 2025-07-15 PROCEDURE — 1036F TOBACCO NON-USER: CPT | Performed by: ORTHOPAEDIC SURGERY

## 2025-07-15 PROCEDURE — 3017F COLORECTAL CA SCREEN DOC REV: CPT | Performed by: ORTHOPAEDIC SURGERY

## 2025-07-15 PROCEDURE — 1123F ACP DISCUSS/DSCN MKR DOCD: CPT | Performed by: ORTHOPAEDIC SURGERY

## 2025-07-15 PROCEDURE — G8417 CALC BMI ABV UP PARAM F/U: HCPCS | Performed by: ORTHOPAEDIC SURGERY

## 2025-07-15 PROCEDURE — 1125F AMNT PAIN NOTED PAIN PRSNT: CPT | Performed by: ORTHOPAEDIC SURGERY

## 2025-07-15 PROCEDURE — 1159F MED LIST DOCD IN RCRD: CPT | Performed by: ORTHOPAEDIC SURGERY

## 2025-07-15 PROCEDURE — 99203 OFFICE O/P NEW LOW 30 MIN: CPT | Performed by: ORTHOPAEDIC SURGERY

## 2025-07-15 RX ORDER — DABIGATRAN ETEXILATE 150 MG/1
150 CAPSULE ORAL 2 TIMES DAILY
Qty: 180 CAPSULE | Refills: 3 | Status: SHIPPED | OUTPATIENT
Start: 2025-07-15

## 2025-07-15 RX ORDER — DABIGATRAN ETEXILATE 150 MG/1
150 CAPSULE ORAL 2 TIMES DAILY
Qty: 180 CAPSULE | Refills: 3 | Status: SHIPPED | OUTPATIENT
Start: 2025-07-15 | End: 2025-07-15

## 2025-07-15 RX ORDER — TROSPIUM CHLORIDE 20 MG/1
20 TABLET, FILM COATED ORAL
COMMUNITY
Start: 2025-06-16

## 2025-07-15 NOTE — PROGRESS NOTES
CHIEF COMPLAINT: Left foot / ankle pain    History:   Mr. Alba 70 y.o. male presents today for evaluation of left foot pain.  The patient was self-referred.  This is evaluated as a personal. There was not a history of injury.   The pain began 4-5 years ago.  Symptoms significantly worsen last weekend.   He rates pain at 8/10.   The pain is located along the anterolateral ankle.  He notes swelling.  He has pain with weightbearing.   The patient has not taken NSAIDs, because he is on Pradaxa.  He denies history of DVT.  The patient has not ice.  He has not tried any braces, sleeves, or straps.        Past Medical History:   Diagnosis Date    Anticoagulant long-term use     Atrial fibrillation (HCC) 10/15/2019    Picked up by new ILR implanted by Dr. Pearce.  < 2% burden for Tachycardia + AFib.    Basal cell carcinoma of right nasal sidewall 06/04/2015    BPH (benign prostatic hyperplasia) 10/18/2010    CAD (coronary artery disease)     Colon, diverticulosis 12/24/2018    Hearing loss Feb 2023    Moderate hearing loss in mid-to-high frequencies    Hyperlipidemia 10/18/2010    Taking Ezetimibe for high cholesterol    Hypertension 03/22/2021    Intermittent atrial fibrillation (HCC) 10/13/2020    Mixed hyperlipidemia     Nephrolithiasis     s/p lithotripsy x 4, calcium oxalate.     ORQUIDEA on CPAP 11/10/2021    PFO (patent foramen ovale)     Raynaud's disease 12/04/2014    SVT (supraventricular tachycardia) 03/22/2021    TIA (transient ischemic attack) 09/27/2023    Wears glasses        Past Surgical History:   Procedure Laterality Date    COLONOSCOPY  2007    q 10,5/2018    COLONOSCOPY  2018    repeat q 10-diverticulosis    CYSTOSCOPY  11/14/2013     CYSTOSCOPY TRANSURETHRAL RESECTION PROSTATE    HERNIA REPAIR      KIDNEY STONE SURGERY Left 08/29/2013    perc. nephrolithotomy,stint placement    MOHS SURGERY  06/2015    [facility]-Dr. France rt nasal sidewall    OTHER SURGICAL HISTORY      perirectal abscess 1992

## 2025-07-17 ENCOUNTER — OFFICE VISIT (OUTPATIENT)
Dept: ORTHOPEDIC SURGERY | Age: 70
End: 2025-07-17
Payer: MEDICARE

## 2025-07-17 VITALS — BODY MASS INDEX: 27.33 KG/M2 | WEIGHT: 190.92 LBS | HEIGHT: 70 IN

## 2025-07-17 DIAGNOSIS — M79.672 LEFT FOOT PAIN: Primary | ICD-10-CM

## 2025-07-17 DIAGNOSIS — M19.072 ARTHRITIS OF MIDTARSAL JOINT OF LEFT FOOT: ICD-10-CM

## 2025-07-17 PROCEDURE — 3017F COLORECTAL CA SCREEN DOC REV: CPT | Performed by: ORTHOPAEDIC SURGERY

## 2025-07-17 PROCEDURE — 1159F MED LIST DOCD IN RCRD: CPT | Performed by: ORTHOPAEDIC SURGERY

## 2025-07-17 PROCEDURE — 1123F ACP DISCUSS/DSCN MKR DOCD: CPT | Performed by: ORTHOPAEDIC SURGERY

## 2025-07-17 PROCEDURE — G8427 DOCREV CUR MEDS BY ELIG CLIN: HCPCS | Performed by: ORTHOPAEDIC SURGERY

## 2025-07-17 PROCEDURE — 1036F TOBACCO NON-USER: CPT | Performed by: ORTHOPAEDIC SURGERY

## 2025-07-17 PROCEDURE — G8417 CALC BMI ABV UP PARAM F/U: HCPCS | Performed by: ORTHOPAEDIC SURGERY

## 2025-07-17 PROCEDURE — 1125F AMNT PAIN NOTED PAIN PRSNT: CPT | Performed by: ORTHOPAEDIC SURGERY

## 2025-07-17 PROCEDURE — 99214 OFFICE O/P EST MOD 30 MIN: CPT | Performed by: ORTHOPAEDIC SURGERY

## 2025-07-17 RX ORDER — NAPROXEN 500 MG/1
500 TABLET ORAL 2 TIMES DAILY WITH MEALS
Qty: 60 TABLET | Refills: 0 | Status: SHIPPED | OUTPATIENT
Start: 2025-07-17 | End: 2025-08-16

## 2025-07-17 NOTE — PROGRESS NOTES
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    HISTORY:  Mr. Alba 70 y.o.  male presents today for the first visit for evaluation of left midfoot pain which started years ago.  He is complaining of sharp pain 3/10.  Pain is increase with standing and walking and shoe wear. Pain is sharp with first few steps, dull achy pain by the end of the day. Alleviating factors: rest. No radiation and no numbness and tingling sensation. No other complaint.      Past Medical History:   Diagnosis Date    Anticoagulant long-term use     Atrial fibrillation (HCC) 10/15/2019    Picked up by new ILR implanted by Dr. Pearce.  < 2% burden for Tachycardia + AFib.    Basal cell carcinoma of right nasal sidewall 06/04/2015    BPH (benign prostatic hyperplasia) 10/18/2010    CAD (coronary artery disease)     Colon, diverticulosis 12/24/2018    Hearing loss Feb 2023    Moderate hearing loss in mid-to-high frequencies    Hyperlipidemia 10/18/2010    Taking Ezetimibe for high cholesterol    Hypertension 03/22/2021    Intermittent atrial fibrillation (HCC) 10/13/2020    Mixed hyperlipidemia     Nephrolithiasis     s/p lithotripsy x 4, calcium oxalate.     ORQUIDEA on CPAP 11/10/2021    PFO (patent foramen ovale)     Raynaud's disease 12/04/2014    SVT (supraventricular tachycardia) 03/22/2021    TIA (transient ischemic attack) 09/27/2023    Wears glasses        Past Surgical History:   Procedure Laterality Date    COLONOSCOPY  2007    q 10,5/2018    COLONOSCOPY  2018    repeat q 10-diverticulosis    CYSTOSCOPY  11/14/2013     CYSTOSCOPY TRANSURETHRAL RESECTION PROSTATE    HERNIA REPAIR      KIDNEY STONE SURGERY Left 08/29/2013    perc. nephrolithotomy,stint placement    MOHS SURGERY  06/2015    [facility]-Dr. France rt nasal sidewall    OTHER SURGICAL HISTORY      perirectal abscess 1992    PROSTATE BIOPSY  03/13/2023    [facility]    PROSTATE SURGERY  04/2005    biopsy    TONSILLECTOMY  1963       Social History     Socioeconomic

## 2025-07-21 ENCOUNTER — PATIENT MESSAGE (OUTPATIENT)
Dept: CARDIOLOGY CLINIC | Age: 70
End: 2025-07-21

## 2025-07-22 NOTE — TELEPHONE ENCOUNTER
Called to discuss with Zacarias. Would prefer he use tylenol over naproxen and only use the naproxen on occasion if he really needs it. He v/u

## 2025-07-30 ENCOUNTER — HOSPITAL ENCOUNTER (OUTPATIENT)
Dept: VASCULAR LAB | Age: 70
Discharge: HOME | End: 2025-08-01
Attending: ORTHOPAEDIC SURGERY
Payer: MEDICARE

## 2025-07-30 DIAGNOSIS — M25.572 ACUTE LEFT ANKLE PAIN: ICD-10-CM

## 2025-07-30 DIAGNOSIS — M79.89 CALF SWELLING: ICD-10-CM

## 2025-07-30 PROCEDURE — 93970 EXTREMITY STUDY: CPT

## 2025-07-31 PROCEDURE — 93970 EXTREMITY STUDY: CPT | Performed by: SURGERY

## 2025-08-11 PROCEDURE — 93298 REM INTERROG DEV EVAL SCRMS: CPT | Performed by: INTERNAL MEDICINE

## 2025-08-14 ENCOUNTER — OFFICE VISIT (OUTPATIENT)
Dept: ORTHOPEDIC SURGERY | Age: 70
End: 2025-08-14

## 2025-08-14 VITALS — HEIGHT: 70 IN | BODY MASS INDEX: 27.06 KG/M2 | WEIGHT: 189 LBS

## 2025-08-14 DIAGNOSIS — M47.812 CERVICAL SPONDYLOSIS WITHOUT MYELOPATHY: ICD-10-CM

## 2025-08-14 DIAGNOSIS — M50.30 DDD (DEGENERATIVE DISC DISEASE), CERVICAL: ICD-10-CM

## 2025-08-14 DIAGNOSIS — M54.2 NECK PAIN: Primary | ICD-10-CM
